# Patient Record
Sex: MALE | Race: BLACK OR AFRICAN AMERICAN | NOT HISPANIC OR LATINO | Employment: FULL TIME | URBAN - METROPOLITAN AREA
[De-identification: names, ages, dates, MRNs, and addresses within clinical notes are randomized per-mention and may not be internally consistent; named-entity substitution may affect disease eponyms.]

---

## 2017-10-15 ENCOUNTER — HOSPITAL ENCOUNTER (EMERGENCY)
Facility: OTHER | Age: 52
Discharge: HOME | End: 2017-10-15
Attending: EMERGENCY MEDICINE
Payer: COMMERCIAL

## 2017-10-15 VITALS
RESPIRATION RATE: 16 BRPM | TEMPERATURE: 98 F | HEIGHT: 75 IN | OXYGEN SATURATION: 99 % | DIASTOLIC BLOOD PRESSURE: 104 MMHG | HEART RATE: 76 BPM | SYSTOLIC BLOOD PRESSURE: 171 MMHG | WEIGHT: 254 LBS | BODY MASS INDEX: 31.58 KG/M2

## 2017-10-15 DIAGNOSIS — M54.41 CHRONIC RIGHT-SIDED LOW BACK PAIN WITH RIGHT-SIDED SCIATICA: Primary | ICD-10-CM

## 2017-10-15 DIAGNOSIS — G89.29 CHRONIC RIGHT-SIDED LOW BACK PAIN WITH RIGHT-SIDED SCIATICA: Primary | ICD-10-CM

## 2017-10-15 PROCEDURE — 99283 EMERGENCY DEPT VISIT LOW MDM: CPT | Mod: 25

## 2017-10-15 PROCEDURE — 96372 THER/PROPH/DIAG INJ SC/IM: CPT

## 2017-10-15 PROCEDURE — 63600175 PHARM REV CODE 636 W HCPCS: Performed by: PHYSICIAN ASSISTANT

## 2017-10-15 PROCEDURE — 25000003 PHARM REV CODE 250: Performed by: PHYSICIAN ASSISTANT

## 2017-10-15 RX ORDER — OXAPROZIN 600 MG/1
600 TABLET, FILM COATED ORAL DAILY
Qty: 20 TABLET | Refills: 0 | Status: SHIPPED | OUTPATIENT
Start: 2017-10-15 | End: 2019-06-25 | Stop reason: ALTCHOICE

## 2017-10-15 RX ORDER — OXYCODONE AND ACETAMINOPHEN 5; 325 MG/1; MG/1
1 TABLET ORAL
Status: COMPLETED | OUTPATIENT
Start: 2017-10-15 | End: 2017-10-15

## 2017-10-15 RX ORDER — KETOROLAC TROMETHAMINE 30 MG/ML
30 INJECTION, SOLUTION INTRAMUSCULAR; INTRAVENOUS
Status: COMPLETED | OUTPATIENT
Start: 2017-10-15 | End: 2017-10-15

## 2017-10-15 RX ORDER — ORPHENADRINE CITRATE 100 MG/1
100 TABLET, EXTENDED RELEASE ORAL 2 TIMES DAILY
Qty: 10 TABLET | Refills: 0 | Status: SHIPPED | OUTPATIENT
Start: 2017-10-15 | End: 2017-10-20

## 2017-10-15 RX ORDER — ORPHENADRINE CITRATE 30 MG/ML
60 INJECTION INTRAMUSCULAR; INTRAVENOUS
Status: COMPLETED | OUTPATIENT
Start: 2017-10-15 | End: 2017-10-15

## 2017-10-15 RX ADMIN — OXYCODONE HYDROCHLORIDE AND ACETAMINOPHEN 1 TABLET: 5; 325 TABLET ORAL at 04:10

## 2017-10-15 RX ADMIN — KETOROLAC TROMETHAMINE 30 MG: 30 INJECTION, SOLUTION INTRAMUSCULAR at 03:10

## 2017-10-15 RX ADMIN — ORPHENADRINE CITRATE 60 MG: 30 INJECTION INTRAMUSCULAR; INTRAVENOUS at 03:10

## 2017-10-15 NOTE — ED TRIAGE NOTES
Pt reports prior pain management office shut down and could no longer see him. Pt reports the office he was referred to instructed him that neurosurgery is the only option. Pt reports he is in the process of setting up an operation that would take place in [place]. Pt reports he has had positive relief with Toradol. Pt reports he is out of his home medications and it is causing him difficulty to get up the 3 flights of stairs at work. Pt with paperwork from [facility].

## 2017-10-15 NOTE — ED PROVIDER NOTES
"Encounter Date: 10/15/2017       History     Chief Complaint   Patient presents with    Sciatica     "problem with sciatica", lower right back pain, shooting through butt and radiating down.      Patient is 52 year old male who presents with complaints of exacerbation of chronic lower back pain and right sided sciatica. Patient reports that he was previously being cared for by the chronic pain clinic at [facility] which is now about of business.  He was referred to multiple pain management clinic and has explored options without any relief from injections.  Ultimately he has been referred for neurosurgery but he has not yet been able to make an appointment.  He is currently out of his narcotic pain medication and reports that his daily dose of Aleve is not managing her symptoms appropriately.  He reports he has pain with every step.  He denies falls, lotion the weakness, bladder or bowel incontinence or fever chills.  He does admit that he has had relief with Toradol in the past.  He is not currently taking any muscle relaxer's but reports muscle relaxer have also helped in the past.  He is also requesting for any pain management resources at [facility] may have to offer.  He is currently unaccompanied in the ER.          Review of patient's allergies indicates:   Allergen Reactions    Corticosteroids (glucocorticoids)      "Psychosis"     Past Medical History:   Diagnosis Date    Anxiety     Arthritis      No past surgical history on file.  Family History   Problem Relation Age of Onset    Hypertension Mother      Social History   Substance Use Topics    Smoking status: Never Smoker    Smokeless tobacco: Not on file      Comment: .  3 children.   with NOFD.    Alcohol use No     Review of Systems   Constitutional: Negative for fever.   HENT: Negative for sore throat.    Respiratory: Negative for shortness of breath.    Cardiovascular: Negative for chest pain.   Gastrointestinal: Negative " for nausea.   Genitourinary: Negative for dysuria.   Musculoskeletal: Positive for back pain.   Skin: Negative for rash.   Neurological: Negative for weakness.   Hematological: Does not bruise/bleed easily.       Physical Exam     Initial Vitals   BP Pulse Resp Temp SpO2   10/15/17 1513 10/15/17 1511 10/15/17 1511 10/15/17 1511 10/15/17 1511   (!) 179/100 85 16 98.1 °F (36.7 °C) 98 %      MAP       10/15/17 1513       126.33         Physical Exam    Nursing note and vitals reviewed.  Constitutional: He appears well-developed and well-nourished. He is not diaphoretic. No distress.   Healthy appearing male in NAD or apparent pain. He makes good eye contact, speaks in clear full sentences and ambulates with mildly antalgic gait.    HENT:   Head: Normocephalic and atraumatic.   Eyes: Conjunctivae and EOM are normal. Pupils are equal, round, and reactive to light. Right eye exhibits no discharge. Left eye exhibits no discharge. No scleral icterus.   Neck: Normal range of motion.   Cardiovascular: Normal rate, regular rhythm and normal heart sounds. Exam reveals no gallop and no friction rub.    No murmur heard.  Pulmonary/Chest: Breath sounds normal. He has no wheezes. He has no rhonchi. He has no rales.   Abdominal: Soft. Bowel sounds are normal. There is no tenderness. There is no rebound and no guarding.   Musculoskeletal: Normal range of motion. He exhibits no edema or tenderness.   No C, T, L midline bony tenderness crepitus or step-offs.  Right-sided lumbar paraspinal musculature tenderness to palpation with negative straight leg raise.  Normal sensation to light touch to bilateral lower extremities with normal range of motion.  No bony landmark tenderness or deformities to bilateral lower extremity.   Neurological: He is alert and oriented to person, place, and time. He has normal strength. No cranial nerve deficit or sensory deficit.   Skin: Skin is warm. Capillary refill takes less than 2 seconds. No rash and no  abscess noted. No erythema.   Psychiatric: He has a normal mood and affect. His behavior is normal. Thought content normal.         ED Course   Procedures  Labs Reviewed - No data to display          Medical Decision Making:   ED Management:  Urgent evaluation a 52-year-old male who presents with complaints of acute exacerbation of chronic lower back pain and sciatica.  He is afebrile, nontoxic appearing, hemodynamically stable though hypertensive likely second to pain at 179/100.  Physical exam reveals no concern for acute for vertebral fracture, cord compression, cauda equina syndrome or spinal infection.  No neuro imaging felt warranted at this time.  Do not feel appropriate prescribing narcotic pain medication for this patient's chronic pain exacerbation however we'll give IM Toradol and muscle relaxers and discharge with the same.  We'll encourage him to establish care at [facility] pain management clinic.  He verbalizes understanding is amenable to plan.  Case discussed with attending who agrees with plan  Other:   I have discussed this case with another health care provider.       <> Summary of the Discussion: [facility]                    ED Course      Clinical Impression:   The encounter diagnosis was Chronic right-sided low back pain with right-sided sciatica.                           Dana Johnson PA-C  10/15/17 5768

## 2017-10-15 NOTE — ED NOTES
"Pt reports sciatica pain x 7 years, used to be controlled with pain meds but PCP stopped prescribing them. Pt followed up with pain mgmt, informed pt to follow up with neurosurgery because "there was nothing they could do".   "

## 2017-10-16 ENCOUNTER — HOSPITAL ENCOUNTER (EMERGENCY)
Facility: OTHER | Age: 52
Discharge: HOME | End: 2017-10-16
Attending: EMERGENCY MEDICINE
Payer: COMMERCIAL

## 2017-10-16 VITALS
RESPIRATION RATE: 20 BRPM | HEART RATE: 84 BPM | SYSTOLIC BLOOD PRESSURE: 177 MMHG | BODY MASS INDEX: 31.58 KG/M2 | WEIGHT: 254 LBS | DIASTOLIC BLOOD PRESSURE: 95 MMHG | TEMPERATURE: 97 F | OXYGEN SATURATION: 97 % | HEIGHT: 75 IN

## 2017-10-16 DIAGNOSIS — M54.31 RIGHT SIDED SCIATICA: Primary | ICD-10-CM

## 2017-10-16 PROCEDURE — 99283 EMERGENCY DEPT VISIT LOW MDM: CPT

## 2017-10-16 PROCEDURE — 25000003 PHARM REV CODE 250: Performed by: EMERGENCY MEDICINE

## 2017-10-16 RX ORDER — OXYCODONE AND ACETAMINOPHEN 5; 325 MG/1; MG/1
1 TABLET ORAL
Status: COMPLETED | OUTPATIENT
Start: 2017-10-16 | End: 2017-10-16

## 2017-10-16 RX ORDER — OXYCODONE AND ACETAMINOPHEN 5; 325 MG/1; MG/1
1 TABLET ORAL EVERY 6 HOURS PRN
Qty: 12 TABLET | Refills: 0 | Status: ON HOLD | OUTPATIENT
Start: 2017-10-16 | End: 2018-01-19 | Stop reason: HOSPADM

## 2017-10-16 RX ORDER — LURASIDONE HYDROCHLORIDE 40 MG/1
40 TABLET, FILM COATED ORAL NIGHTLY
COMMUNITY
End: 2019-06-25

## 2017-10-16 RX ADMIN — OXYCODONE HYDROCHLORIDE AND ACETAMINOPHEN 1 TABLET: 5; 325 TABLET ORAL at 08:10

## 2017-10-17 NOTE — ED TRIAGE NOTES
Pt CO chronic lower back pain, and is unable to walk a long distance, and also unable to lift right leg. Pt also CO tingling and pain radiating down both legs.

## 2017-10-17 NOTE — ED PROVIDER NOTES
"Encounter Date: 10/16/2017    SCRIBE #1 NOTE: I, Bre Farias , am scribing for, and in the presence of, Dr. Morris.       History     Chief Complaint   Patient presents with    Sciatica     R side sciatica x 7 yrs, was here yesterday for same     Time seen by provider: 7:59 PM    This is a 52 y.o. male who presents with complaint of back pain that began seven years ago. Right lower back pain reoccurred yesterday, and is described as constant, severe, and radiating to the RLE. Pt has experienced difficulty walking because the pain causes his RLE to "lock up." He reports taking Percocet for seven years, but "ran out." Pt denies incontinence, weakness, numbness, or any urinary symptoms.He was instructed to follow up with neurology while being seen by pain management, and received Daypro and Norflex when seen in the ED yesterday for the same symptoms.       The history is provided by the spouse and the patient.     Review of patient's allergies indicates:   Allergen Reactions    Corticosteroids (glucocorticoids)      "Psychosis"     Past Medical History:   Diagnosis Date    Anxiety     Arthritis     Sciatica      History reviewed. No pertinent surgical history.  Family History   Problem Relation Age of Onset    Hypertension Mother      Social History   Substance Use Topics    Smoking status: Never Smoker    Smokeless tobacco: Never Used      Comment: .  3 children.   with NOFD.    Alcohol use No     Review of Systems   Constitutional: Negative for chills and fever.   HENT: Negative for congestion and sore throat.    Eyes: Negative for photophobia and redness.   Respiratory: Negative for cough and shortness of breath.    Cardiovascular: Negative for chest pain.   Gastrointestinal: Negative for abdominal pain, nausea and vomiting.   Genitourinary: Negative for decreased urine volume, difficulty urinating, dysuria, frequency, hematuria and urgency.        Negative for incontinence.  "   Musculoskeletal: Positive for back pain.   Skin: Negative for rash.   Neurological: Negative for weakness, light-headedness, numbness and headaches.   Psychiatric/Behavioral: Negative for confusion.       Physical Exam     Initial Vitals [10/16/17 1917]   BP Pulse Resp Temp SpO2   (!) 177/95 84 20 97.4 °F (36.3 °C) 97 %      MAP       122.33         Physical Exam    Nursing note and vitals reviewed.  Constitutional: He appears well-developed and well-nourished. He is not diaphoretic. No distress.   HENT:   Head: Normocephalic and atraumatic.   Right Ear: External ear normal.   Left Ear: External ear normal.   Eyes: EOM are normal. Pupils are equal, round, and reactive to light. Right eye exhibits no discharge. Left eye exhibits no discharge.   Neck: Normal range of motion.   Cardiovascular: Normal rate, regular rhythm and normal heart sounds. Exam reveals no gallop and no friction rub.    No murmur heard.  Pulmonary/Chest: Breath sounds normal. No respiratory distress. He has no wheezes. He has no rhonchi. He has no rales.   Abdominal: Soft. There is no tenderness. There is no rebound and no guarding.   Musculoskeletal: He exhibits no edema or tenderness.   Unable to perform straight leg test.    Neurological: He is alert and oriented to person, place, and time.   Strength is 5/5 to the bilateral lower extremities proximally and distally.   Skin: Skin is warm and dry. No rash and no abscess noted. No erythema. No pallor.   Psychiatric: He has a normal mood and affect. His behavior is normal. Judgment and thought content normal.         ED Course   Procedures  Labs Reviewed - No data to display             Additional MDM:   Comments: 52-year-old male with a chronic history of right-sided sciatica previously managed by pain management at [facility] presents with complaint of persistent pain not relieved with Norflex and Daypro given to him yesterday in the emergency department here.  He denies any new symptoms.   He again denies any bladder or bowel dysfunction.  He reports having difficulty walking today but attributes it to the severe pain and not to leg weakness or numbness.  I do not suspect cord compression.  I did initially offer him a dose of morphine IM since he states the Toradol did not provide him any relief yesterday and he is ALLERGIC to steroids.  However, the patient is currently on duty for work.  He did insists that he could take Percocet as he has taken Percocet in the past and has been able to work.  I did inform him that he is not able to drive after taking this.  His wife is present in the emergency department and states that she will be driving him home.  He was given a dose of Percocet and a prescription for Percocet ×2 days.  He was also given  the contact information to the back and spine center at [facility] for further evaluation.  .              ED Course      Clinical Impression:     1. Right sided sciatica                                 Ema Morris MD  10/16/17 2046

## 2017-10-20 ENCOUNTER — HOSPITAL ENCOUNTER (OUTPATIENT)
Dept: RADIOLOGY | Facility: OTHER | Age: 52
Discharge: HOME | End: 2017-10-20
Attending: PHYSICIAN ASSISTANT
Payer: COMMERCIAL

## 2017-10-20 DIAGNOSIS — M54.5 CHRONIC LOW BACK PAIN, UNSPECIFIED BACK PAIN LATERALITY, WITH SCIATICA PRESENCE UNSPECIFIED: ICD-10-CM

## 2017-10-20 DIAGNOSIS — G89.29 CHRONIC LOW BACK PAIN, UNSPECIFIED BACK PAIN LATERALITY, WITH SCIATICA PRESENCE UNSPECIFIED: Primary | ICD-10-CM

## 2017-10-20 DIAGNOSIS — M54.5 CHRONIC LOW BACK PAIN, UNSPECIFIED BACK PAIN LATERALITY, WITH SCIATICA PRESENCE UNSPECIFIED: Primary | ICD-10-CM

## 2017-10-20 DIAGNOSIS — G89.29 CHRONIC LOW BACK PAIN, UNSPECIFIED BACK PAIN LATERALITY, WITH SCIATICA PRESENCE UNSPECIFIED: ICD-10-CM

## 2017-10-20 PROCEDURE — 72100 X-RAY EXAM L-S SPINE 2/3 VWS: CPT | Mod: 26,,, | Performed by: RADIOLOGY

## 2017-10-20 PROCEDURE — 72100 X-RAY EXAM L-S SPINE 2/3 VWS: CPT | Mod: TC

## 2017-10-20 PROCEDURE — 72120 X-RAY BEND ONLY L-S SPINE: CPT | Mod: 26,,, | Performed by: RADIOLOGY

## 2017-10-23 ENCOUNTER — OFFICE VISIT (OUTPATIENT)
Dept: PAIN MEDICINE | Facility: CLINIC | Age: 52
End: 2017-10-23
Attending: ANESTHESIOLOGY
Payer: COMMERCIAL

## 2017-10-23 VITALS
HEART RATE: 84 BPM | SYSTOLIC BLOOD PRESSURE: 157 MMHG | RESPIRATION RATE: 18 BRPM | HEIGHT: 75 IN | BODY MASS INDEX: 30.7 KG/M2 | OXYGEN SATURATION: 99 % | WEIGHT: 246.94 LBS | DIASTOLIC BLOOD PRESSURE: 101 MMHG

## 2017-10-23 DIAGNOSIS — M54.16 LUMBAR RADICULOPATHY: ICD-10-CM

## 2017-10-23 DIAGNOSIS — M47.816 FACET ARTHROPATHY, LUMBAR: ICD-10-CM

## 2017-10-23 DIAGNOSIS — M54.12 CERVICAL RADICULOPATHY: ICD-10-CM

## 2017-10-23 DIAGNOSIS — M51.36 DDD (DEGENERATIVE DISC DISEASE), LUMBAR: ICD-10-CM

## 2017-10-23 PROBLEM — M51.369 DDD (DEGENERATIVE DISC DISEASE), LUMBAR: Status: ACTIVE | Noted: 2017-10-23

## 2017-10-23 PROCEDURE — 99204 OFFICE O/P NEW MOD 45 MIN: CPT | Mod: S$GLB,,, | Performed by: ANESTHESIOLOGY

## 2017-10-23 PROCEDURE — 99999 PR PBB SHADOW E&M-EST. PATIENT-LVL IV: CPT | Mod: PBBFAC,,, | Performed by: ANESTHESIOLOGY

## 2017-10-23 NOTE — PROGRESS NOTES
Chronic Pain - New Consult    Referring Physician: Self, Aaareferral    Chief Complaint:   Chief Complaint   Patient presents with    Low-back Pain     New Patient         SUBJECTIVE: Disclaimer: This note has been generated using voice-recognition software. There may be typographical errors that have been missed during proof-reading    Initial encounter:    Saul Hancock presents to the clinic for the evaluation of Low back pain. The pain started over approximately 7 years ago following an accident and symptoms have been worsening.    Brief history:  Patient reports history of psychosis associated with multiple epidural steroid injections in the past he was started on Latuda after this with psychiatry at outside facility he states that he sees a psychiatrist regularly last visit was several months ago.  The patient also reports that he did discontinue all his medications including oxycodone acetaminophen oxaprozin and Latuda.  He states that he is currently not taking any medication    Pain Description:    The pain is located in the Low back  area and radiates to the Right lower extremity in the L5/S1 distribution.      At BEST  7/10     At WORST  10/10 on the WORST day.      On average pain is rated as 7/10.     Today the pain is rated as 8/10    The pain is described as burning, dull, numbing, sharp, shooting, throbbing, tight band and tingling      Symptoms interfere with daily activity, sleeping and work.     Exacerbating factors: Sitting, Standing, Laying, Bending, Coughing/Sneezing, Walking, Night Time, Morning, Lifting and Getting out of bed/chair.      Mitigating factors medications.     Patient denies night fever/night sweats, urinary incontinence, bowel incontinence, significant weight loss and significant motor weakness.  Patient denies any suicidal or homicidal ideations    Pain Medications:  Current:stopped Oxycodone    Tried in Past:  NSAIDs -Naproxen and mobic  TCA -Never  SNRI  "-Never  Anti-convulsants -Never  Muscle Relaxants -Robaxin   Opioids-oxycodone    Physical Therapy/Home Exercise: no       report:  Reviewed and consistent with medication use as prescribed.    Pain Procedures: had some in the past    Chiropractor -never  Acupuncture - never  TENS unit -never  Spinal decompression -never  Joint replacement -never    Imaging:    MRI lumbar spine 2012 at disc herniation with impingement and contact on the right S1 nerve root    MRI cervical spine 2012 multilevel neuroforaminal stenosis which was moderate to severe    X-ray Lumbar spine 10/20/2017  Technique: AP and lateral views including flexion/extension views of the lumbar spine.    Comparison: None    Findings:   Degenerative disc disease at L5-S1. No instability. Vertebral body heights are maintained. Paravertebral soft tissues are unremarkable.   Impression        Degenerative disc disease at L5-S1. No instability.      Electronically signed by: MADDIE MOSER MD  Date: 10/20/17  Time: 14:12     Encounter     View Encounter                Past Medical History:   Diagnosis Date    Anxiety     Arthritis     Sciatica      History reviewed. No pertinent surgical history.  Social History     Social History    Marital status:      Spouse name: N/A    Number of children: N/A    Years of education: N/A     Occupational History    Not on file.     Social History Main Topics    Smoking status: Never Smoker    Smokeless tobacco: Never Used      Comment: .  3 children.   with NOFD.    Alcohol use No    Drug use: No    Sexual activity: Yes     Partners: Female     Other Topics Concern    Not on file     Social History Narrative    No narrative on file     Family History   Problem Relation Age of Onset    Hypertension Mother        Review of patient's allergies indicates:   Allergen Reactions    Corticosteroids (glucocorticoids)      "Psychosis"       Current Outpatient Prescriptions " "  Medication Sig    lurasidone (LATUDA) 40 mg Tab tablet Take 40 mg by mouth nightly.    oxaprozin (DAYPRO) 600 mg tablet Take 1 tablet (600 mg total) by mouth once daily.    oxycodone-acetaminophen (PERCOCET) 5-325 mg per tablet Take 1 tablet by mouth every 6 (six) hours as needed for Pain.     No current facility-administered medications for this visit.        REVIEW OF SYSTEMS:    GENERAL:  No weight loss, malaise or fevers.  HEENT:   No recent changes in vision or hearing  NECK:  Negative for lumps, no difficulty with swallowing.  RESPIRATORY:  Negative for cough, wheezing or shortness of breath, patient denies any recent URI.  CARDIOVASCULAR:  Negative for chest pain, leg swelling or palpitations.  GI:  Negative for abdominal discomfort, blood in stools or black stools or change in bowel habits.  MUSCULOSKELETAL:  See HPI.  SKIN:  Negative for lesions, rash, and itching.  PSYCH: History of psychosis associated with steroid injections.  Patients sleep is disturbed secondary to pain.  HEMATOLOGY/LYMPHOLOGY:  Negative for prolonged bleeding, bruising easily or swollen nodes.  Patient is not currently taking any anti-coagulants  ENDO: No history of diabetes or thyroid dysfunction  NEURO:   No history of headaches, syncope, paralysis, seizures or tremors.  All other reviewed and negative other than HPI.    OBJECTIVE:    BP (!) 157/101   Pulse 84   Resp 18   Ht 6' 3" (1.905 m)   Wt 112 kg (246 lb 14.6 oz)   SpO2 99%   BMI 30.86 kg/m²     PHYSICAL EXAMINATION:    GENERAL: Well appearing, in no acute distress, alert and oriented x3.  PSYCH:  Mood and affect appropriate.  SKIN: Skin color, texture, turgor normal, no rashes or lesions.  HEAD/FACE:  Normocephalic, atraumatic. Cranial nerves grossly intact.  CV: RRR with palpation of the radial artery.  PULM: No evidence of respiratory difficulty, symmetric chest rise.  NECK:  Spurling negative, normal range of motion of neck without reproduction of pain.  BACK: " Straight leg raising in the sitting and supine positions is negative to radicular pain. There is pain with palpation over the facet joints of the lumbar spine bilaterally. There is decreased range of motion with extension to 15 degrees, and facet loading maneuvers cause reproducible pain.    EXTREMITIES: Peripheral joint ROM is full and pain free without obvious instability or laxity in all four extremities. No deformities, edema, or skin discoloration. Good capillary refill.  MUSCULOSKELETAL: Hip, and knee provocative maneuvers are negative.  There is  pain with palpation over the sacroiliac joints bilaterally.  There is no pain to palpation over the greater trochanteric bursa bilaterally.  FABERs test is positive.  FADIRs test is negative.  Bilateral upper extremity hand strength is normal and symmetric.  4/5 strength in right ankle with plantar and 4/5 dorsiflexion, 5/5 strength in left ankle with plantar and dorsiflexion, 5/5 strength with right knee flexion extension, 5/5 strength with knee flexion extension on the left .  No atrophy or tone abnormalities are noted.  NEURO: Bilateral upper and lower extremity coordination and muscle stretch reflexes are physiologic and symmetric.  Plantar response are downgoing. Valerio's negative. No clonus.  Decreased sensation light touch over the medial lateral aspect of the right foot  GAIT: Antalgic, ambulates with crutches    ASSESSMENT: 52 y.o. year old male with pain, consistent with     Encounter Diagnoses   Name Primary?    Lumbar radiculopathy     Cervical radiculopathy     DDD (degenerative disc disease), lumbar     Facet arthropathy, lumbar        PLAN:   I will obtain flex and extension x-rays of the cervical spine to rule out instability prior to physical therapy    I will send the patient physical therapy for core strengthening and stretching for the lumbar spine, the patient does have weakness in his right lower extremity which could be old.    Update MRI  lumbar spine without contrast to evaluate for anatomic changes compared to 2012 MRI.    Follow-up in 2 weeks to review imaging, in the future the patient may benefit from neurosurgical evaluation versus nonsteroid-based injections    Contact psychiatrist to inform them that he has stopped Latuda    Greater than 25 minutes spent in total in todays visit with the patient, with more than half that time direct face to face counseling and education with the patient today. We discussed the disease process, prognosis, treatment plan, and risks and benefits.     Daly Montalvo  10/23/2017

## 2017-10-27 ENCOUNTER — HOSPITAL ENCOUNTER (OUTPATIENT)
Dept: RADIOLOGY | Facility: OTHER | Age: 52
Discharge: HOME | End: 2017-10-27
Attending: ANESTHESIOLOGY
Payer: COMMERCIAL

## 2017-10-27 DIAGNOSIS — M51.36 DDD (DEGENERATIVE DISC DISEASE), LUMBAR: ICD-10-CM

## 2017-10-27 DIAGNOSIS — M54.12 CERVICAL RADICULOPATHY: ICD-10-CM

## 2017-10-27 DIAGNOSIS — M47.816 FACET ARTHROPATHY, LUMBAR: ICD-10-CM

## 2017-10-27 DIAGNOSIS — M54.16 LUMBAR RADICULOPATHY: ICD-10-CM

## 2017-10-27 PROCEDURE — 72052 X-RAY EXAM NECK SPINE 6/>VWS: CPT | Mod: TC

## 2017-10-27 PROCEDURE — 72148 MRI LUMBAR SPINE W/O DYE: CPT | Mod: 26,,, | Performed by: RADIOLOGY

## 2017-10-27 PROCEDURE — 72052 X-RAY EXAM NECK SPINE 6/>VWS: CPT | Mod: 26,,, | Performed by: RADIOLOGY

## 2017-10-27 PROCEDURE — 72148 MRI LUMBAR SPINE W/O DYE: CPT | Mod: TC

## 2017-11-02 ENCOUNTER — CLINICAL SUPPORT (OUTPATIENT)
Dept: REHABILITATION | Facility: HOSPITAL | Age: 52
End: 2017-11-02
Attending: ANESTHESIOLOGY
Payer: COMMERCIAL

## 2017-11-02 DIAGNOSIS — M51.9 LUMBAR DISC DISEASE: ICD-10-CM

## 2017-11-02 DIAGNOSIS — Z78.9 IMPAIRED MOBILITY AND ACTIVITIES OF DAILY LIVING: ICD-10-CM

## 2017-11-02 DIAGNOSIS — R52 PAIN RELATED TO IMPAIRED PHYSICAL MOBILITY: ICD-10-CM

## 2017-11-02 DIAGNOSIS — M54.16 LUMBAR RADICULOPATHY: Primary | ICD-10-CM

## 2017-11-02 DIAGNOSIS — Z74.09 IMPAIRED MOBILITY AND ACTIVITIES OF DAILY LIVING: ICD-10-CM

## 2017-11-02 DIAGNOSIS — Z74.09 PAIN RELATED TO IMPAIRED PHYSICAL MOBILITY: ICD-10-CM

## 2017-11-02 DIAGNOSIS — M51.36 DDD (DEGENERATIVE DISC DISEASE), LUMBAR: ICD-10-CM

## 2017-11-02 PROCEDURE — 97161 PT EVAL LOW COMPLEX 20 MIN: CPT | Mod: PN | Performed by: PHYSICAL THERAPIST
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
## 2017-11-07 ENCOUNTER — OFFICE VISIT (OUTPATIENT)
Dept: PAIN MEDICINE | Facility: CLINIC | Age: 52
End: 2017-11-07
Attending: ANESTHESIOLOGY
Payer: COMMERCIAL

## 2017-11-07 VITALS
HEIGHT: 75 IN | RESPIRATION RATE: 18 BRPM | HEART RATE: 78 BPM | TEMPERATURE: 98 F | DIASTOLIC BLOOD PRESSURE: 111 MMHG | BODY MASS INDEX: 31.25 KG/M2 | WEIGHT: 251.31 LBS | SYSTOLIC BLOOD PRESSURE: 168 MMHG

## 2017-11-07 DIAGNOSIS — M51.9 LUMBAR DISC DISEASE: Primary | ICD-10-CM

## 2017-11-07 DIAGNOSIS — M51.36 DDD (DEGENERATIVE DISC DISEASE), LUMBAR: ICD-10-CM

## 2017-11-07 DIAGNOSIS — M47.816 FACET ARTHROPATHY, LUMBAR: ICD-10-CM

## 2017-11-07 DIAGNOSIS — M51.16 LUMBAR DISC HERNIATION WITH RADICULOPATHY: ICD-10-CM

## 2017-11-07 DIAGNOSIS — M54.16 LUMBAR RADICULOPATHY: ICD-10-CM

## 2017-11-07 PROCEDURE — 99999 PR PBB SHADOW E&M-EST. PATIENT-LVL III: CPT | Mod: PBBFAC,,, | Performed by: ANESTHESIOLOGY

## 2017-11-07 PROCEDURE — 99214 OFFICE O/P EST MOD 30 MIN: CPT | Mod: S$GLB,,, | Performed by: ANESTHESIOLOGY

## 2017-11-07 RX ORDER — GABAPENTIN 300 MG/1
CAPSULE ORAL
Qty: 180 CAPSULE | Refills: 2 | Status: SHIPPED | OUTPATIENT
Start: 2017-11-07 | End: 2017-11-30 | Stop reason: SDUPTHER

## 2017-11-07 RX ORDER — OXYCODONE AND ACETAMINOPHEN 10; 325 MG/1; MG/1
1 TABLET ORAL EVERY 6 HOURS PRN
Qty: 30 TABLET | Refills: 0 | Status: SHIPPED | OUTPATIENT
Start: 2017-11-07 | End: 2017-11-30 | Stop reason: SDUPTHER
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
## 2017-11-09 ENCOUNTER — CLINICAL SUPPORT (OUTPATIENT)
Dept: REHABILITATION | Facility: HOSPITAL | Age: 52
End: 2017-11-09
Attending: ANESTHESIOLOGY
Payer: COMMERCIAL

## 2017-11-09 DIAGNOSIS — M54.12 CERVICAL RADICULOPATHY: ICD-10-CM

## 2017-11-09 DIAGNOSIS — M54.16 LUMBAR RADICULOPATHY: Primary | ICD-10-CM

## 2017-11-09 DIAGNOSIS — M51.9 LUMBAR DISC DISEASE: ICD-10-CM

## 2017-11-09 DIAGNOSIS — M51.36 DDD (DEGENERATIVE DISC DISEASE), LUMBAR: ICD-10-CM

## 2017-11-09 PROCEDURE — 97110 THERAPEUTIC EXERCISES: CPT | Mod: PN | Performed by: PHYSICAL THERAPIST

## 2017-11-09 NOTE — PROGRESS NOTES
"                                            Physical Therapy Daily Note           Name: Saul Hancock  [facility] Number: [id number]  Date of Treatment: 11/09/2017   Diagnosis:   Encounter Diagnoses   Name Primary?    Lumbar radiculopathy Yes    DDD (degenerative disc disease), lumbar     Lumbar disc disease        Time in: 1315  Time Out: 1425  Total Treatment Time: 70    VISITS / MURO: 2  BOLD=INDICATES ACTIVITIES PERFORMED.      Subjective  Patient states "his pain is in the low back, buttocks and gluteal region RLE. Said he went to the MD and he prescribed some percocet. Had MRI that showed a disc bulge. Reports hip pain on the right that has been there for many years but the pain meds have hidden how much of a problem he actually is having to deal with.   Pain level - 7/10 back/glut/post thigh    Objective    Treatment: Patient  was instructed in and performed therapeutic exercises to develop strength, ROM, flexibility, posture, core stabilization and pain reduction . Patient performed therapeutic exercises consisting of the following      Leg press   Recumbent bike  Upright bike   UE ergometer  Treadmill   Elliptical       THERAPEUTIC EXERCISE  SUPINE  -sciatic nerve flossing 5/10   -SKC 5" x12   -LTR  x20    SIDELYING  -left SL w/R - rot mobs x12     PRONE  -prone w/right lateral shift 3'   -sustained exten 3'   -repeated exten  x30     STANDING.  -wall exten w/right lateral shift x12   -thread the needle thoracic rotation       SITTING   -back stretches   -dural flossing x10       MANUAL THERAPY  MODALITY  DIRECT EDUCATION:   :   Direct education 1-1 with PT spent with the patient  7'  minutes as to his ( condition) with emphasis on  management of his condition.  Advised that he will continue to encounter pain associated with activity but the goal is to reduce the intensity and location to more proximal from distal .    Patient was issued HEP   Written Home Exercises Reviewed.   Pt demo good understanding of " the education provided. Patient demonstrated good return demonstration of activities      Assessment  Patient completed the activities as noted. He reported that he ws feeling somewhat better at EOS. Demonstrates pain with all movements and all activities but the pain does not inhibit his ability perform the exercises.   Medical Necessity is demonstrated by:   challenged/ difficulty  to participate in daily activities and   to participate in vocational pursuits, Pain limits function of effected part for some activities, Requires skilled supervision to complete and progress treatment interventions and HEP.  Pt demo good understanding of the education provided. Patient demonstrated good return demonstration of activities.Patient's tolerance to treatment was fair. Patient will continue to benefit from skilled PTintervention.Patient is making progress towards established goals.  New/Revised goals: no  Continue with established Plan of Care towards PT goals.       PLAN     Certification Period: 11/2/2017 to 2/2/2018  Recommended Treatment Plan: 2 times per week for 12 weeks: Manual Therapy, Neuromuscular Re-ed, Patient Education and Therapeutic Exercise  Other Recommendations:   . Pt may be seen by PTA as part of the rehabilitation team.         Therapist: Montana Rosenberg, PT

## 2017-11-10 PROBLEM — R52 PAIN RELATED TO IMPAIRED PHYSICAL MOBILITY: Status: ACTIVE | Noted: 2017-11-10

## 2017-11-10 PROBLEM — Z74.09 IMPAIRED MOBILITY AND ACTIVITIES OF DAILY LIVING: Status: ACTIVE | Noted: 2017-11-10

## 2017-11-10 PROBLEM — Z78.9 IMPAIRED MOBILITY AND ACTIVITIES OF DAILY LIVING: Status: ACTIVE | Noted: 2017-11-10

## 2017-11-10 PROBLEM — Z74.09 PAIN RELATED TO IMPAIRED PHYSICAL MOBILITY: Status: ACTIVE | Noted: 2017-11-10
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
## 2017-11-14 ENCOUNTER — HOSPITAL ENCOUNTER (OUTPATIENT)
Facility: OTHER | Age: 52
Discharge: HOME | End: 2017-11-14
Attending: ANESTHESIOLOGY | Admitting: ANESTHESIOLOGY
Payer: COMMERCIAL

## 2017-11-14 ENCOUNTER — SURGERY (OUTPATIENT)
Age: 52
End: 2017-11-14

## 2017-11-14 VITALS
TEMPERATURE: 98 F | WEIGHT: 251 LBS | DIASTOLIC BLOOD PRESSURE: 94 MMHG | OXYGEN SATURATION: 96 % | RESPIRATION RATE: 18 BRPM | HEART RATE: 66 BPM | SYSTOLIC BLOOD PRESSURE: 163 MMHG | HEIGHT: 75 IN | BODY MASS INDEX: 31.21 KG/M2

## 2017-11-14 DIAGNOSIS — M54.16 LUMBAR RADICULOPATHY: Primary | ICD-10-CM

## 2017-11-14 PROCEDURE — 64484 NJX AA&/STRD TFRM EPI L/S EA: CPT | Performed by: ANESTHESIOLOGY

## 2017-11-14 PROCEDURE — 64483 NJX AA&/STRD TFRM EPI L/S 1: CPT | Performed by: ANESTHESIOLOGY

## 2017-11-14 PROCEDURE — 99152 MOD SED SAME PHYS/QHP 5/>YRS: CPT | Mod: ,,, | Performed by: ANESTHESIOLOGY

## 2017-11-14 PROCEDURE — 64484 NJX AA&/STRD TFRM EPI L/S EA: CPT | Mod: RT,,, | Performed by: ANESTHESIOLOGY

## 2017-11-14 PROCEDURE — 25000003 PHARM REV CODE 250: Performed by: ANESTHESIOLOGY

## 2017-11-14 PROCEDURE — 25500020 PHARM REV CODE 255: Performed by: ANESTHESIOLOGY

## 2017-11-14 PROCEDURE — 64483 NJX AA&/STRD TFRM EPI L/S 1: CPT | Mod: RT,,, | Performed by: ANESTHESIOLOGY

## 2017-11-14 PROCEDURE — 63600175 PHARM REV CODE 636 W HCPCS: Performed by: ANESTHESIOLOGY

## 2017-11-14 RX ORDER — METHOCARBAMOL 500 MG/1
500 TABLET, FILM COATED ORAL 2 TIMES DAILY
Status: ON HOLD | COMMUNITY
End: 2018-01-19

## 2017-11-14 RX ORDER — LIDOCAINE HYDROCHLORIDE 10 MG/ML
INJECTION INFILTRATION; PERINEURAL
Status: DISCONTINUED | OUTPATIENT
Start: 2017-11-14 | End: 2017-11-14 | Stop reason: HOSPADM

## 2017-11-14 RX ORDER — MIDAZOLAM HYDROCHLORIDE 1 MG/ML
INJECTION INTRAMUSCULAR; INTRAVENOUS
Status: DISCONTINUED | OUTPATIENT
Start: 2017-11-14 | End: 2017-11-14 | Stop reason: HOSPADM

## 2017-11-14 RX ORDER — NAPROXEN 500 MG/1
500 TABLET ORAL ONCE AS NEEDED
Status: ON HOLD | COMMUNITY
End: 2018-01-19 | Stop reason: HOSPADM

## 2017-11-14 RX ORDER — BUPIVACAINE HYDROCHLORIDE 2.5 MG/ML
INJECTION, SOLUTION EPIDURAL; INFILTRATION; INTRACAUDAL
Status: DISCONTINUED | OUTPATIENT
Start: 2017-11-14 | End: 2017-11-14 | Stop reason: HOSPADM

## 2017-11-14 RX ORDER — SODIUM CHLORIDE 9 MG/ML
INJECTION, SOLUTION INTRAVENOUS CONTINUOUS
Status: DISCONTINUED | OUTPATIENT
Start: 2017-11-14 | End: 2017-11-14 | Stop reason: HOSPADM

## 2017-11-14 RX ADMIN — IOHEXOL 10 ML: 300 INJECTION, SOLUTION INTRAVENOUS at 11:11

## 2017-11-14 RX ADMIN — LIDOCAINE HYDROCHLORIDE 10 ML: 10 INJECTION, SOLUTION INFILTRATION; PERINEURAL at 11:11

## 2017-11-14 RX ADMIN — SODIUM CHLORIDE: 0.9 INJECTION, SOLUTION INTRAVENOUS at 10:11

## 2017-11-14 RX ADMIN — BUPIVACAINE HYDROCHLORIDE 10 ML: 2.5 INJECTION, SOLUTION EPIDURAL; INFILTRATION; INTRACAUDAL; PERINEURAL at 11:11

## 2017-11-14 RX ADMIN — MIDAZOLAM HYDROCHLORIDE 2 MG: 1 INJECTION, SOLUTION INTRAMUSCULAR; INTRAVENOUS at 11:11

## 2017-11-14 NOTE — OP NOTE
INFORMED CONSENT: The procedure, risks, benefits and options were discussed with patient. There are no contraindications to the procedure. The patient expressed understanding and agreed to proceed. The personnel performing the procedure was discussed.    11/14/2017    Surgeon: Daly Montalvo MD    Assistants:   Armando Schultz DO, PGY-5, Pain Fellow  I was present and supervising all critical portions of the procedure      PROCEDURE:    1)  Right  L5 TRANSFORAMINAL EPIDURAL STEROID INJECTION    2)  Right  S1 TRANSFORAMINAL EPIDURAL STEROID INJECTION    Pre Procedure diagnosis:    Right  L5 and S1  Lumbar radiculopathy [M54.16]    Post-Procedure diagnosis:   same    Complications: None    Specimens: None      DESCRIPTION OF PROCEDURE: The patient was brought to the procedure room. IV access was obtained prior to the procedure. The patient was positioned prone on the fluoroscopy table. Continuous hemodynamic monitoring was initiated including blood pressure, EKG, and pulse oximetry. . The skin was prepped with chlorhexidine and draped in a sterile fashion. Skin anesthesia was achieved using a total of 10mL of lidocaine, 5mL over each respective injection site.     The  L5 and S1  transforaminal spaces were identified with fluoroscopy in the  AP, oblique, and lateral views.  A 22 gauge spinal quinke needle was then advanced into the area of the trans foraminal spaces bilaterally with confirmation of proper needle position using AP, oblique, and lateral fluoroscopic views. Once the needle tip was in the area of the transforaminal space, and there was no blood, CSF or paraesthesias,  1.5 mL of Omnipaque 300mg/ml was injected on each side for a total of 3mL.  Fluoroscopic imaging in the AP and lateral views revealed a clear outline of the spinal nerve with proximal spread of agent through the neural foramen into the epidural space. A total combination of 1 mL of Bupivicaine 0.25% and 1 mL 1% lidocaine was injected on each  side for a total of 4mL of injected medications with displacement of the contrast dye confirming that the medication went into the area of the transforaminal spaces bilaterally. A sterile dressing was applied.   Patient tolerated the procedure well.    Conscious sedation provided by M.D    The patient was monitored with continuous pulse oximetry, EKG, and intermittent blood pressure monitors.  The patient was hemodynamically stable throughout the entire process was responsive to voice, and breathing spontaneously.  Supplemental O2 was provided at 2L/min via nasal cannula.  Patient was comfortable for the duration of the procedure. (See nurse documentation and case log for sedation time)    There was a total of 2mg IV Midazolam was titrated for the procedure      Patient was taken back to the recovery room for further observation.     The patient was discharged to home in stable condition

## 2017-11-14 NOTE — DISCHARGE SUMMARY
Discharge Note  Short Stay      SUMMARY     Admit Date: 11/14/2017    Attending Physician: Daly Montalvo    Discharge Diagnosis: Lumbar radiculopathy [M54.16]    Discharge Physician: Daly Montalvo      Discharge Date: 11/14/2017 11:46 AM     PROCEDURE:    1)  Right  L5 TRANSFORAMINAL EPIDURAL STEROID INJECTION    2)  Right  S1 TRANSFORAMINAL EPIDURAL STEROID INJECTION    Pre Procedure diagnosis:    Right  L5 and S1  Lumbar radiculopathy [M54.16]    Disposition: Home or self care    Patient Instructions:   Current Discharge Medication List      CONTINUE these medications which have NOT CHANGED    Details   gabapentin (NEURONTIN) 300 MG capsule Take 1 pill at nightx 3 days, followed by 1 pill in morning and night for 3 days, followed by 1 pill morning afternoon and night  Qty: 180 capsule, Refills: 2      lurasidone (LATUDA) 40 mg Tab tablet Take 40 mg by mouth nightly.      methocarbamol (ROBAXIN) 500 MG Tab Take 500 mg by mouth 2 (two) times daily.      naproxen (NAPROSYN) 500 MG tablet Take 500 mg by mouth once as needed.      oxaprozin (DAYPRO) 600 mg tablet Take 1 tablet (600 mg total) by mouth once daily.  Qty: 20 tablet, Refills: 0      oxyCODONE-acetaminophen (PERCOCET)  mg per tablet Take 1 tablet by mouth every 6 (six) hours as needed for Pain.  Qty: 30 tablet, Refills: 0      oxycodone-acetaminophen (PERCOCET) 5-325 mg per tablet Take 1 tablet by mouth every 6 (six) hours as needed for Pain.  Qty: 12 tablet, Refills: 0             Resume home diet and activity

## 2017-11-14 NOTE — DISCHARGE INSTRUCTIONS

## 2017-11-16 ENCOUNTER — CLINICAL SUPPORT (OUTPATIENT)
Dept: REHABILITATION | Facility: HOSPITAL | Age: 52
End: 2017-11-16
Attending: ANESTHESIOLOGY
Payer: COMMERCIAL

## 2017-11-16 DIAGNOSIS — M51.9 LUMBAR DISC DISEASE: ICD-10-CM

## 2017-11-16 DIAGNOSIS — M51.36 DDD (DEGENERATIVE DISC DISEASE), LUMBAR: ICD-10-CM

## 2017-11-16 DIAGNOSIS — M54.16 LUMBAR RADICULOPATHY: Primary | ICD-10-CM

## 2017-11-16 PROCEDURE — 97110 THERAPEUTIC EXERCISES: CPT | Mod: PN | Performed by: PHYSICAL THERAPIST

## 2017-11-16 NOTE — PROGRESS NOTES
"                                            Physical Therapy Daily Note           Name: Saul Hancock  [facility] Number: [id number]  Date of Treatment: 11/16/2017   Diagnosis:   Encounter Diagnoses   Name Primary?    Lumbar radiculopathy Yes    DDD (degenerative disc disease), lumbar     Lumbar disc disease        Time in: 1310  Time Out: 1420  Total Treatment Time: 70    VISITS / MURO: 3/40 - 12/31/2017  BOLD=INDICATES ACTIVITIES PERFORMED.      Subjective  Patient states "he is a lot better than when he first came here. Says he had injections on the 14th which seems to have helped some. Did some exercises last night. Still with pain today but feeling better. Says the pain levels are tolerable meaning "Renaldo walking w/o the crutches".     Pain level - 7-10/10 back/glut/post thigh RLE    Objective    Treatment: Patient  was instructed in and performed therapeutic exercises to develop strength, ROM, flexibility, posture, core stabilization and pain reduction . Patient performed therapeutic exercises consisting of the following    Nu-step 10"  Leg press   Recumbent bike  Upright bike   UE ergometer  Treadmill   Elliptical       THERAPEUTIC EXERCISE  SUPINE  -sciatic nerve flossing 3@1'   -SKC 5" x12   -LTR  x20  -DKC alternate x15     SIDELYING  -left SL w/R - rot mobs x12     PRONE  -prone w/right lateral shift 3'   -sustained exten 3'   -repeated exten  x30      STANDING.  -wall exten w/right lateral shift x12   -thread the needle thoracic rotation       SITTING   -back stretches   -dural flossing x10       MANUAL THERAPY  MODALITY  DIRECT EDUCATION:   :   Direct education 1-1 with PT spent with the patient  7'  minutes as to his ( condition) with emphasis on  management of his condition.  Advised that he will continue to encounter pain associated with activity but the goal is to reduce the intensity and location to more proximal from distal .    Patient was issued HEP   Written Home Exercises Reviewed.   Pt demo " good understanding of the education provided. Patient demonstrated good return demonstration of activities      Assessment    Completed activities. Encountered increased pain with prone lying and positional shifting. The pain became more proximal and intensified in the gluteal and low back. Patient reports he is somewhat better but pain levels are unchanged. He is walking with out crutches but pain level is the same as initial visit.    Medical Necessity is demonstrated by:   challenged/ difficulty  to participate in daily activities and   to participate in vocational pursuits, Pain limits function of effected part for some activities, Requires skilled supervision to complete and progress treatment interventions and HEP.  Pt demo good understanding of the education provided. Patient demonstrated good return demonstration of activities.Patient's tolerance to treatment was fair. Patient will continue to benefit from skilled PTintervention.Patient is making progress towards established goals.  New/Revised goals: no  Continue with established Plan of Care towards PT goals.       PLAN     Certification Period: 11/2/2017 to 2/2/2018  Recommended Treatment Plan: 2 times per week for 12 weeks: Manual Therapy, Neuromuscular Re-ed, Patient Education and Therapeutic Exercise  Other Recommendations:   . Pt may be seen by PTA as part of the rehabilitation team.         Therapist: Montana Rosenberg, PT

## 2017-11-21 ENCOUNTER — CLINICAL SUPPORT (OUTPATIENT)
Dept: REHABILITATION | Facility: HOSPITAL | Age: 52
End: 2017-11-21
Attending: ANESTHESIOLOGY
Payer: COMMERCIAL

## 2017-11-21 DIAGNOSIS — M54.16 LUMBAR RADICULOPATHY: Primary | ICD-10-CM

## 2017-11-21 DIAGNOSIS — M51.36 DDD (DEGENERATIVE DISC DISEASE), LUMBAR: ICD-10-CM

## 2017-11-21 DIAGNOSIS — M51.9 LUMBAR DISC DISEASE: ICD-10-CM

## 2017-11-21 PROCEDURE — 97110 THERAPEUTIC EXERCISES: CPT | Mod: PN

## 2017-11-21 NOTE — PROGRESS NOTES
"                                            Physical Therapy Daily Note         Name: Saul Hancock  Clinic Number: [id number]  Date of Treatment: 11/21/2017   Diagnosis:   Encounter Diagnoses   Name Primary?    Lumbar radiculopathy Yes    DDD (degenerative disc disease), lumbar     Lumbar disc disease      Time in: 1330  Time Out: 1430  Total Treatment Time: 60 minutes (1:1 with PTA 30 minutes of treatment session)     VISITS / MURO: 4/40 - 12/31/2017  BOLD=INDICATES ACTIVITIES PERFORMED.    Subjective  Saul states his back is slowly getting better. Patient states he does think the exercises are helping him.     Pain level - 6-10/10 back/glut/post thigh RLE    Objective    Treatment: Patient  was instructed in and performed therapeutic exercises to develop strength, ROM, flexibility, posture, core stabilization and pain reduction . Patient performed therapeutic exercises consisting of the following    Nu-step x 7 minutes  Leg press   Recumbent bike  Upright bike   UE ergometer  Treadmill   Elliptical     THERAPEUTIC EXERCISE  SUPINE  -Hamstring stretch with nerve glide 1 minute x 3   -SKC 5" x12   -LTR  x20  -DKC alternate x15   -Piriformis stretch 30 sec x 3   -Hip adduction/abduction x 3 minutes (ball and belt--isometric)   -TrA activation x 3 minutes, 5 sec hold     SIDELYING  -Open books (hand across chest) 10x 5 sec hold     PRONE  -prone w/right lateral shift 3'   -sustained exten 3'   -repeated exten  x30      STANDING.  -wall exten w/right lateral shift x12     SITTING   -back stretches   -thread the needle thoracic rotation (using physioball)  -Seated ball roll outs x 3 minutes (all 3 directions)  -dural flossing x10     MANUAL THERAPY  MODALITY  DIRECT EDUCATION:      Patient was issued HEP   Written Home Exercises Reviewed.   Pt demo good understanding of the education provided. Patient demonstrated good return demonstration of activities    Assessment    Saul tolerated treatment well today. " Patient demonstrates good tolerance to therapeutic exercise with no complaints of RLE radicular symptoms today. Patient able to progress to hip and core stabilization exercises today without complaints of lower back pain but increased difficulty noted with isolated transverse recruitment. Patient remains guarded with most activities with increased right lateral shift and fear avoidance behaviors noted.   Medical Necessity is demonstrated by: challenged/ difficulty  to participate in daily activities and   to participate in vocational pursuits, Pain limits function of effected part for some activities, Requires skilled supervision to complete and progress treatment interventions and HEP.Pt demo good understanding of the education provided. Patient demonstrated good return demonstration of activities.Patient's tolerance to treatment was fair. Patient will continue to benefit from skilled PTintervention.Patient is making progress towards established goals.  New/Revised goals: no  Continue with established Plan of Care towards PT goals.       PLAN     Certification Period: 11/2/2017 to 2/2/2018  Recommended Treatment Plan: 2 times per week for 12 weeks: Manual Therapy, Neuromuscular Re-ed, Patient Education and Therapeutic Exercise  Other Recommendations: . Pt may be seen by PTA as part of the rehabilitation team.      Consuelo De Jesus PTA  11/21/2017

## 2017-11-27 ENCOUNTER — CLINICAL SUPPORT (OUTPATIENT)
Dept: REHABILITATION | Facility: HOSPITAL | Age: 52
End: 2017-11-27
Attending: ANESTHESIOLOGY
Payer: COMMERCIAL

## 2017-11-27 DIAGNOSIS — R52 PAIN RELATED TO IMPAIRED PHYSICAL MOBILITY: ICD-10-CM

## 2017-11-27 DIAGNOSIS — Z74.09 PAIN RELATED TO IMPAIRED PHYSICAL MOBILITY: ICD-10-CM

## 2017-11-27 DIAGNOSIS — M51.36 DDD (DEGENERATIVE DISC DISEASE), LUMBAR: ICD-10-CM

## 2017-11-27 DIAGNOSIS — Z74.09 IMPAIRED MOBILITY AND ACTIVITIES OF DAILY LIVING: ICD-10-CM

## 2017-11-27 DIAGNOSIS — Z78.9 IMPAIRED MOBILITY AND ACTIVITIES OF DAILY LIVING: ICD-10-CM

## 2017-11-27 DIAGNOSIS — M51.9 LUMBAR DISC DISEASE: ICD-10-CM

## 2017-11-27 DIAGNOSIS — M54.16 LUMBAR RADICULOPATHY: Primary | ICD-10-CM

## 2017-11-27 PROCEDURE — 97110 THERAPEUTIC EXERCISES: CPT | Mod: PN | Performed by: PHYSICAL THERAPIST

## 2017-11-27 NOTE — PROGRESS NOTES
"                                            Physical Therapy Daily Note           Name: Saul Hancock  [facility] Number: [id number]  Date of Treatment: 11/27/2017   Diagnosis:   Encounter Diagnoses   Name Primary?    Lumbar radiculopathy Yes    DDD (degenerative disc disease), lumbar     Lumbar disc disease     Impaired mobility and activities of daily living     Pain related to impaired physical mobility        Time in: 1315  Time Out: 1420  Total Treatment Time: 65    VISITS / MURO: 5/40 - 12/31/2017  BOLD=INDICATES ACTIVITIES PERFORMED.      Subjective  Patient states "that he is in less pain today than previously.  Says the pain is tolerable in the back and RLE.       Pain level -  3-4 /10  Objective    Treatment: Patient  was instructed in and performed therapeutic exercises to develop strength, ROM, flexibility, posture, core stabilization and pain reduction . Patient performed therapeutic exercises consisting of the following    Nu-step 10"  Leg press   Recumbent bike  Upright bike   UE ergometer  Treadmill   Elliptical       THERAPEUTIC EXERCISE  SUPINE  -sciatic nerve flossing 3@1'   -SKC 5" x12   -LTR  x20  -DKC alternate x15     SIDELYING  -left SL w/R - rot mobs x12   -open books x15  -flossing x20    PRONE  -prone w/right lateral shift 3'   -sustained exten w/R lateral shift  3'   -repeated exten  x30      STANDING.   -wall exten w/right lateral shift x12   -thread the needle thoracic rotation       SITTING   -back stretches   -dural flossing x10       MANUAL THERAPY  MODALITY  DIRECT EDUCATION:   :   Direct education 1-1 with PT spent with the patient  7'  minutes as to his ( condition) with emphasis on  management of his condition.  Advised that he will continue to encounter pain associated with activity but the goal is to reduce the intensity and location to more proximal from distal .    Patient was issued HEP   Written Home Exercises Reviewed.   Pt demo good understanding of the education " provided. Patient demonstrated good return demonstration of activities      Assessment  Patient completed his routine with chronic moaning. Requires verbal cueing for several activities that he has been performing which usually indicates a lack of compliance with his exercises at home. He verbalized that he had increased pain at the end of the session however he was ambulating no differently than when he arrived. He is not using an assistive device at this time.   Medical Necessity is demonstrated by:   challenged/ difficulty  to participate in daily activities and   to participate in vocational pursuits, Pain limits function of effected part for some activities, Requires skilled supervision to complete and progress treatment interventions and HEP.  Pt demo good understanding of the education provided. Patient demonstrated good return demonstration of activities.Patient's tolerance to treatment was fair. Patient will continue to benefit from skilled PTintervention.Patient is making progress towards established goals.  New/Revised goals: no  Continue with established Plan of Care towards PT goals.       PLAN     Certification Period: 11/2/2017 to 2/2/2018  Recommended Treatment Plan: 2 times per week for 12 weeks: Manual Therapy, Neuromuscular Re-ed, Patient Education and Therapeutic Exercise  Other Recommendations:   . Pt may be seen by PTA as part of the rehabilitation team.         Therapist: Montana Rosenberg, PT

## 2017-11-30 ENCOUNTER — OFFICE VISIT (OUTPATIENT)
Dept: PAIN MEDICINE | Facility: CLINIC | Age: 52
End: 2017-11-30
Payer: COMMERCIAL

## 2017-11-30 VITALS
BODY MASS INDEX: 31.52 KG/M2 | TEMPERATURE: 98 F | HEART RATE: 92 BPM | SYSTOLIC BLOOD PRESSURE: 150 MMHG | HEIGHT: 75 IN | RESPIRATION RATE: 16 BRPM | DIASTOLIC BLOOD PRESSURE: 90 MMHG | WEIGHT: 253.5 LBS

## 2017-11-30 DIAGNOSIS — G89.4 CHRONIC PAIN DISORDER: ICD-10-CM

## 2017-11-30 DIAGNOSIS — M47.816 LUMBAR SPONDYLOSIS: ICD-10-CM

## 2017-11-30 DIAGNOSIS — M51.16 LUMBAR DISC HERNIATION WITH RADICULOPATHY: ICD-10-CM

## 2017-11-30 DIAGNOSIS — M51.36 DDD (DEGENERATIVE DISC DISEASE), LUMBAR: ICD-10-CM

## 2017-11-30 DIAGNOSIS — M54.16 LUMBAR RADICULOPATHY: ICD-10-CM

## 2017-11-30 DIAGNOSIS — M54.16 LUMBAR RADICULOPATHY: Primary | ICD-10-CM

## 2017-11-30 PROCEDURE — 99999 PR PBB SHADOW E&M-EST. PATIENT-LVL IV: CPT | Mod: PBBFAC,,, | Performed by: NURSE PRACTITIONER

## 2017-11-30 PROCEDURE — 99213 OFFICE O/P EST LOW 20 MIN: CPT | Mod: S$GLB,,, | Performed by: NURSE PRACTITIONER

## 2017-11-30 RX ORDER — GABAPENTIN 300 MG/1
600 CAPSULE ORAL 3 TIMES DAILY
Qty: 180 CAPSULE | Refills: 2 | Status: SHIPPED | OUTPATIENT
Start: 2017-11-30 | End: 2018-01-03 | Stop reason: SDUPTHER

## 2017-11-30 RX ORDER — OXYCODONE AND ACETAMINOPHEN 10; 325 MG/1; MG/1
1 TABLET ORAL EVERY 6 HOURS PRN
Qty: 30 TABLET | Refills: 0 | Status: SHIPPED | OUTPATIENT
Start: 2017-11-30 | End: 2017-11-30 | Stop reason: SDUPTHER

## 2017-11-30 RX ORDER — OXYCODONE AND ACETAMINOPHEN 10; 325 MG/1; MG/1
1 TABLET ORAL EVERY 6 HOURS PRN
Qty: 30 TABLET | Refills: 0 | Status: SHIPPED | OUTPATIENT
Start: 2017-11-30 | End: 2018-01-03 | Stop reason: SDUPTHER

## 2017-11-30 NOTE — PATIENT INSTRUCTIONS
Gabapentin dose increase schedule:  - Start with 1 extra capsule at night for 7 days (4 total capsules daily)  - Then increase to 2 capsules in the morning, 1 in the daytime, and 2 at bedtime for 7 days (5 total capsules daily)  - Then increase to 2 capsules three times daily (6 total capsules daily)
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
## 2017-12-11 ENCOUNTER — INITIAL CONSULT (OUTPATIENT)
Dept: NEUROSURGERY | Facility: CLINIC | Age: 52
End: 2017-12-11
Payer: COMMERCIAL

## 2017-12-11 VITALS
BODY MASS INDEX: 31.71 KG/M2 | SYSTOLIC BLOOD PRESSURE: 151 MMHG | DIASTOLIC BLOOD PRESSURE: 97 MMHG | HEART RATE: 78 BPM | HEIGHT: 75 IN | WEIGHT: 255.06 LBS

## 2017-12-11 DIAGNOSIS — M51.16 LUMBAR DISC HERNIATION WITH RADICULOPATHY: Primary | ICD-10-CM

## 2017-12-11 DIAGNOSIS — M54.17 LUMBOSACRAL RADICULOPATHY AT S1: ICD-10-CM

## 2017-12-11 PROCEDURE — 99205 OFFICE O/P NEW HI 60 MIN: CPT | Mod: S$GLB,,, | Performed by: NEUROLOGICAL SURGERY

## 2017-12-11 PROCEDURE — 99999 PR PBB SHADOW E&M-EST. PATIENT-LVL III: CPT | Mod: PBBFAC,,, | Performed by: NEUROLOGICAL SURGERY

## 2017-12-11 NOTE — LETTER
December 11, 2017      Daly oMntalvo MD  [address]  [address]  [facility] [address]           [place] [facility]  [address], [address]  [facility] [address]  Phone: [phone]          Patient: Saul Hancock   MR Number: [id number]   YOB: 1965   Date of Visit: 12/11/2017       Dear Dr. Daly Montalvo:    Thank you for referring Saul Hancock to me for evaluation. Attached you will find relevant portions of my assessment and plan of care.    If you have questions, please do not hesitate to call me. I look forward to following Saul Hancock along with you.    Sincerely,    Adelso Hernandez MD    Enclosure  CC:  No Recipients    If you would like to receive this communication electronically, please contact [email or web address] or [phone] to request more information on [employer] access.    For providers and/or their staff who would like to refer a patient to [facility], please contact us through our one-stop-shop provider referral line, [facility], at [phone].    If you feel you have received this communication in error or would no longer like to receive these types of communications, please e-mail [email or web address]
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
## 2017-12-22 ENCOUNTER — TELEPHONE (OUTPATIENT)
Dept: NEUROSURGERY | Facility: CLINIC | Age: 52
End: 2017-12-22

## 2017-12-22 DIAGNOSIS — M54.16 LUMBAR RADICULOPATHY: Primary | ICD-10-CM

## 2017-12-22 DIAGNOSIS — M51.06 INTERVERTEBRAL LUMBAR DISC DISORDER WITH MYELOPATHY, LUMBAR REGION: ICD-10-CM

## 2017-12-27 ENCOUNTER — ANESTHESIA EVENT (OUTPATIENT)
Dept: SURGERY | Facility: HOSPITAL | Age: 52
End: 2017-12-27
Payer: COMMERCIAL

## 2017-12-27 DIAGNOSIS — M79.604 PAIN OF RIGHT LOWER EXTREMITY: Primary | ICD-10-CM

## 2017-12-27 NOTE — ANESTHESIA PREPROCEDURE EVALUATION
Anesthesia Assessment: Preoperative EQUATION    Planned Procedure: Procedure(s) (LRB):  MICRODISCECTOMY Right L5-S1 Microdiscectomy (Right)  Requested Anesthesia Type:General  Surgeon: Adelso Hernandez MD  Service: Neurosurgery  Known or anticipated Date of Surgery:1/19/2018  Optimization:  Anesthesia Preop Clinic Assessment NOT  Indicated  Plan:    Testing:  Hematology Profile, BMP, PT/INR, PTT and UA    PT IS A [facility]OVA[facility] WITNESS       Consultation:Patient's PCP for a statement of optimization      Patient  has previously scheduled Medical Appointment: 12/29   52 y.o. male with planned surgery as above.    Known risk factors for perioperative complications: {risk factors None          Kenny Dasilva Jr., MD Encounter Date: 12/29/2017 1:00 PM        Office Visit on 12/29/2017            Detailed Report          Navigation: Tests Scheduled.              Consults scheduled.             Results will be tracked by Preop Clinic.                           No anesthesia preop clinic visit.                                    NICOLLE GOMEZ 12/27    [facility]  Anesthesia Pre-Operative Evaluation         Patient Name: Saul Hancock  YOB: 1965  MRN: [id number]    SUBJECTIVE:     Pre-operative evaluation for Procedure(s) (LRB):  MICRODISCECTOMY Right L5-S1 Microdiscectomy (Right)  Scheduled for 1/19/2018    HPI 01/18/2018:  Saul Hancock is a 52 y.o. male with hx of anxiety and arthritis.    Patient presents for the above procedure(s).    Prev airway:   No prior records in Epic or Legacy Documents.    Oxygen/Ventilation Requirements:  On room air       Patient Active Problem List   Diagnosis    Cervical radiculopathy    DDD (degenerative disc disease), lumbar    Facet arthropathy, lumbar    Lumbar disc herniation with radiculopathy    Chronic pain syndrome    Impaired mobility and activities of daily living    Spinal stenosis of lumbar region without neurogenic claudication  "      Review of patient's allergies indicates:   Allergen Reactions    Corticosteroids (glucocorticoids)      "Psychosis"       Outpatient Medications:  No current facility-administered medications on file prior to encounter.      Current Outpatient Prescriptions on File Prior to Encounter   Medication Sig Dispense Refill    naproxen (NAPROSYN) 500 MG tablet Take 500 mg by mouth once as needed.      oxaprozin (DAYPRO) 600 mg tablet Take 1 tablet (600 mg total) by mouth once daily. 20 tablet 0    oxycodone-acetaminophen (PERCOCET) 5-325 mg per tablet Take 1 tablet by mouth every 6 (six) hours as needed for Pain. 12 tablet 0    lurasidone (LATUDA) 40 mg Tab tablet Take 40 mg by mouth nightly.      methocarbamol (ROBAXIN) 500 MG Tab Take 500 mg by mouth 2 (two) times daily.          Current Inpatient Medications:      Past Surgical History:   Procedure Laterality Date    LUMBAR EPIDURAL INJECTION  11/14/2017       Social History     Social History    Marital status:      Spouse name: N/A    Number of children: 3    Years of education: N/A     Occupational History          NOFD     Social History Main Topics    Smoking status: Never Smoker    Smokeless tobacco: Never Used    Alcohol use No    Drug use: No    Sexual activity: Yes     Partners: Female     Other Topics Concern    Not on file     Social History Narrative    The patient does not exercise regularly ().      He is not satisfied with weight.    Rates diet as good.    He does not drink at least 1/2 gallon water daily.    He drinks 2-3 coffee/tea/caffeine-containing soft drinks daily.    Total sleep time at night is 8 hours.    He works 40 hours per week.    He does wear seat belts.    Hobbies include none.           OBJECTIVE:     Weight:  Wt Readings from Last 4 Encounters:   01/03/18 117 kg (257 lb 15 oz)   12/29/17 116.9 kg (257 lb 12.8 oz)   12/11/17 115.7 kg (255 lb 1.2 oz)   11/30/17 115 kg (253 lb 8.5 oz) "       Vital Signs Range (Last 24H):         CBC:   No results for input(s): WBC, RBC, HGB, HCT, PLT, MCV, MCH, MCHC in the last 72 hours.    CMP: No results for input(s): NA, K, CL, CO2, BUN, CREATININE, GLU, MG, PHOS, CALCIUM, ALBUMIN, PROT, ALKPHOS, ALT, AST, BILITOT in the last 72 hours.    INR:  No results for input(s): INR, PROTIME, APTT in the last 72 hours.    Diagnostic Studies:  CXR 2018  No acute cardiopulmonary disease    MRI Lumbar Spine 10/27/2017  Lumbar spondylosis as above, most significant for large right paracentral disc extrusion at L5-S1 resulting in spinal canal stenosis and impinging upon the descending right S1 nerve root.     EK2017  Vent. Rate : 074 BPM     Atrial Rate : 074 BPM     P-R Int : 164 ms          QRS Dur : 084 ms      QT Int : 376 ms       P-R-T Axes : 064 051 044 degrees     QTc Int : 417 ms    Normal sinus rhythm  Normal ECG  When compared with ECG of 2014 17:43,  No significant change was found     2D Echo:  No results found for this or any previous visit.      ASSESSMENT/PLAN:         Anesthesia Evaluation    I have reviewed the Patient Summary Reports.     I have reviewed the Medications.     Review of Systems  Anesthesia Hx:  No problems with previous Anesthesia  History of prior surgery of interest to airway management or planning: Denies Family Hx of Anesthesia complications.   Denies Personal Hx of Anesthesia complications.   Social:  Non-Smoker, No Alcohol Use    Hematology/Oncology:  Hematology Normal   Oncology Normal     Cardiovascular:   Exercise tolerance: good    Pulmonary:  Pulmonary Normal    Renal/:  Renal/ Normal     Hepatic/GI:   GERD, well controlled    Musculoskeletal:   Arthritis     Neurological:   Cervical radiculopathy  Lumbar disc herniation with radiculopathy  sciatica   Endocrine:  Endocrine Normal    Psych:   anxiety depression             Anesthesia Plan  Type of Anesthesia, risks & benefits discussed:  Anesthesia Type:   general  Patient's Preference:   Intra-op Monitoring Plan: standard ASA monitors  Intra-op Monitoring Plan Comments:   Post Op Pain Control Plan: multimodal analgesia, IV/PO Opioids PRN and per primary service following discharge from PACU  Post Op Pain Control Plan Comments:   Induction:   IV  Beta Blocker:  Patient is not currently on a Beta-Blocker (No further documentation required).       Informed Consent: Patient understands risks and agrees with Anesthesia plan.  Questions answered. Anesthesia consent signed with patient.  ASA Score: 2     Day of Surgery Review of History & Physical: I have interviewed and examined the patient. I have reviewed the patient's H&P dated:  There are no significant changes.  H&P update referred to the surgeon.         Ready For Surgery From Anesthesia Perspective.

## 2017-12-27 NOTE — PRE ADMISSION SCREENING
Anesthesia Assessment: Preoperative EQUATION    Planned Procedure: Procedure(s) (LRB):  MICRODISCECTOMY Right L5-S1 Microdiscectomy (Right)  Requested Anesthesia Type:General  Surgeon: Adelso Hernandez MD  Service: Neurosurgery  Known or anticipated Date of Surgery:1/19/2018  Optimization:  Anesthesia Preop Clinic Assessment NOT  Indicated  Plan:    Testing:  Hematology Profile, BMP, PT/INR, PTT and UA     Consultation:Patient's PCP for a statement of optimization      Patient  has previously scheduled Medical Appointment: 12/29    Navigation: Tests Scheduled.              Consults scheduled.             Results will be tracked by Preop Clinic.                           No anesthesia preop clinic visit.

## 2017-12-29 ENCOUNTER — OFFICE VISIT (OUTPATIENT)
Dept: FAMILY MEDICINE | Facility: CLINIC | Age: 52
End: 2017-12-29
Attending: FAMILY MEDICINE
Payer: COMMERCIAL

## 2017-12-29 VITALS
OXYGEN SATURATION: 97 % | SYSTOLIC BLOOD PRESSURE: 154 MMHG | HEIGHT: 75 IN | WEIGHT: 257.81 LBS | BODY MASS INDEX: 32.06 KG/M2 | DIASTOLIC BLOOD PRESSURE: 96 MMHG | HEART RATE: 90 BPM

## 2017-12-29 DIAGNOSIS — Z11.59 NEED FOR HEPATITIS C SCREENING TEST: ICD-10-CM

## 2017-12-29 DIAGNOSIS — M48.061 SPINAL STENOSIS OF LUMBAR REGION WITHOUT NEUROGENIC CLAUDICATION: ICD-10-CM

## 2017-12-29 DIAGNOSIS — M51.16 LUMBAR DISC HERNIATION WITH RADICULOPATHY: ICD-10-CM

## 2017-12-29 DIAGNOSIS — Z01.818 PREOP EXAMINATION: Primary | ICD-10-CM

## 2017-12-29 DIAGNOSIS — M51.36 DDD (DEGENERATIVE DISC DISEASE), LUMBAR: ICD-10-CM

## 2017-12-29 DIAGNOSIS — Z12.11 SCREEN FOR COLON CANCER: ICD-10-CM

## 2017-12-29 DIAGNOSIS — Z00.00 LABORATORY EXAM ORDERED AS PART OF ROUTINE GENERAL MEDICAL EXAMINATION: ICD-10-CM

## 2017-12-29 DIAGNOSIS — I10 BENIGN HYPERTENSION: ICD-10-CM

## 2017-12-29 PROBLEM — G89.4 CHRONIC PAIN SYNDROME: Status: ACTIVE | Noted: 2017-11-10

## 2017-12-29 PROCEDURE — 93010 ELECTROCARDIOGRAM REPORT: CPT | Mod: S$GLB,,, | Performed by: INTERNAL MEDICINE

## 2017-12-29 PROCEDURE — 93005 ELECTROCARDIOGRAM TRACING: CPT | Mod: S$GLB,,, | Performed by: FAMILY MEDICINE

## 2017-12-29 PROCEDURE — 90715 TDAP VACCINE 7 YRS/> IM: CPT | Mod: S$GLB,,, | Performed by: FAMILY MEDICINE

## 2017-12-29 PROCEDURE — 99999 PR PBB SHADOW E&M-EST. PATIENT-LVL IV: CPT | Mod: PBBFAC,,, | Performed by: FAMILY MEDICINE

## 2017-12-29 PROCEDURE — 99205 OFFICE O/P NEW HI 60 MIN: CPT | Mod: 25,S$GLB,, | Performed by: FAMILY MEDICINE

## 2017-12-29 PROCEDURE — 3008F BODY MASS INDEX DOCD: CPT | Mod: S$GLB,,, | Performed by: FAMILY MEDICINE

## 2017-12-29 PROCEDURE — 90471 IMMUNIZATION ADMIN: CPT | Mod: S$GLB,,, | Performed by: FAMILY MEDICINE

## 2017-12-29 NOTE — PROGRESS NOTES
Subjective:      Saul Hancock is a 52 y.o. male who presents to the office today for a preoperative consultation at the request of surgeon Dr. Adelso Hernandez who plans on performing microsurgical discectomy on January 18. This consultation is requested for the specific conditions prompting preoperative evaluation (i.e. because of potential affect on operative risk): hypertension. Planned anesthesia: general. The patient has the following known anesthesia issues: none. Patients bleeding risk: no recent abnormal bleeding, no remote history of abnormal bleeding and no use of Ca-channel blockers. Patient does have objections to receiving blood products if needed.      Patient Active Problem List   Diagnosis    Cervical radiculopathy    DDD (degenerative disc disease), lumbar    Facet arthropathy, lumbar    Lumbar disc herniation with radiculopathy    Chronic pain syndrome    Impaired mobility and activities of daily living    Spinal stenosis of lumbar region without neurogenic claudication       Past Surgical History:   Procedure Laterality Date    LUMBAR EPIDURAL INJECTION  11/14/2017       Current Outpatient Prescriptions:     gabapentin (NEURONTIN) 300 MG capsule, Take 2 capsules (600 mg total) by mouth 3 (three) times daily., Disp: 180 capsule, Rfl: 2    oxyCODONE-acetaminophen (PERCOCET)  mg per tablet, Take 1 tablet by mouth every 6 (six) hours as needed for Pain., Disp: 30 tablet, Rfl: 0    oxycodone-acetaminophen (PERCOCET) 5-325 mg per tablet, Take 1 tablet by mouth every 6 (six) hours as needed for Pain., Disp: 12 tablet, Rfl: 0    lurasidone (LATUDA) 40 mg Tab tablet, Take 40 mg by mouth nightly., Disp: , Rfl:     methocarbamol (ROBAXIN) 500 MG Tab, Take 500 mg by mouth 2 (two) times daily., Disp: , Rfl:     naproxen (NAPROSYN) 500 MG tablet, Take 500 mg by mouth once as needed., Disp: , Rfl:     oxaprozin (DAYPRO) 600 mg tablet, Take 1 tablet (600 mg total) by mouth once daily.,  "Disp: 20 tablet, Rfl: 0    Review of patient's allergies indicates:   Allergen Reactions    Corticosteroids (glucocorticoids)      "Psychosis"       Family History   Problem Relation Age of Onset    Hypertension Mother     Lung cancer Father     No Known Problems Sister     Leukemia Brother     No Known Problems Brother     No Known Problems Brother        Social History     Social History    Marital status:      Spouse name: N/A    Number of children: 3    Years of education: N/A     Occupational History          NOFD     Social History Main Topics    Smoking status: Never Smoker    Smokeless tobacco: Never Used    Alcohol use No    Drug use: No    Sexual activity: Yes     Partners: Female     Other Topics Concern    Not on file     Social History Narrative    The patient does not exercise regularly ().      He is not satisfied with weight.    Rates diet as good.    He does not drink at least 1/2 gallon water daily.    He drinks 2-3 coffee/tea/caffeine-containing soft drinks daily.    Total sleep time at night is 8 hours.    He works 40 hours per week.    He does wear seat belts.    Hobbies include none.           Patient Care Team:  Primary Doctor No as PCP - General    Review of Systems  A comprehensive review of systems was negative except for: Behavioral/Psych: positive for sleep disturbance (snores, worse with weight gain)     Objective:      BP (!) 154/96 (BP Location: Left arm, Patient Position: Sitting, BP Method: Small (Manual))   Pulse 90   Ht 6' 3" (1.905 m)   Wt 116.9 kg (257 lb 12.8 oz)   SpO2 97%   BMI 32.22 kg/m²     General Appearance:    Alert, cooperative, no distress, appears stated age   Head:    Normocephalic, without obvious abnormality, atraumatic   Eyes:    PERRL, conjunctiva/corneas clear, EOM's intact, fundi     benign, both eyes        Ears:    Normal TM's and external ear canals, both ears   Nose:   Nares normal, septum midline, mucosa " normal, no drainage    or sinus tenderness   Throat:   Lips, mucosa, and tongue normal; teeth and gums normal   Neck:   Supple, symmetrical, trachea midline, no adenopathy;        thyroid:  No enlargement/tenderness/nodules; no carotid    bruit or JVD   Back:     Symmetric, no curvature, ROM normal, no CVA tenderness   Lungs:     Clear to auscultation bilaterally, respirations unlabored   Chest wall:    No tenderness or deformity   Heart:    Regular rate and rhythm, S1 and S2 normal, no murmur, rub   or gallop   Abdomen:     Soft, non-tender, bowel sounds active all four quadrants,     no masses, no organomegaly   Extremities:   Extremities normal, atraumatic, no cyanosis or edema   Pulses:   2+ and symmetric all extremities   Skin:   Skin color, texture, turgor normal, no rashes or lesions   Lymph nodes:   Cervical, supraclavicular, and axillary nodes normal   Neurologic:   CNII-XII intact. Normal strength, sensation and reflexes       throughout       Predictors of intubation difficulty:   Morbid obesity? no   Anatomically abnormal facies? no   Prominent incisors? no   Receding mandible? no   Short, thick neck? no   Neck range of motion: normal   Mallampati score: III (soft and hard palate and base of uvula visible)   Thyromental distance: 8 cm   Mouth openincm   Dentition: No chipped, loose, or missing teeth.    Cardiographics  ECG: normal sinus rhythm, no blocks or conduction defects, no ischemic changes  Echocardiogram: not done    Imaging  Chest x-ray 2017: normal     Lab Review:  Results for BRANDAN LUONG (MRN [id number]) as of 1/3/2018 14:33   Ref. Range 2018 07:06   WBC Latest Ref Range: 3.90 - 12.70 K/uL 10.24   RBC Latest Ref Range: 4.60 - 6.20 M/uL 5.18   Hemoglobin Latest Ref Range: 14.0 - 18.0 g/dL 14.8   Hematocrit Latest Ref Range: 40.0 - 54.0 % 45.8   MCV Latest Ref Range: 82 - 98 fL 88   MCH Latest Ref Range: 27.0 - 31.0 pg 28.6   MCHC Latest Ref Range: 32.0 - 36.0 g/dL 32.3   RDW  Latest Ref Range: 11.5 - 14.5 % 13.5   Platelets Latest Ref Range: 150 - 350 K/uL 286   MPV Latest Ref Range: 9.2 - 12.9 fL 10.5   Gran% Latest Ref Range: 38.0 - 73.0 % 65.5   Gran # Latest Ref Range: 1.8 - 7.7 K/uL 6.7   Immature Granulocytes Latest Ref Range: 0.0 - 0.5 % 0.2   Immature Grans (Abs) Latest Ref Range: 0.00 - 0.04 K/uL 0.02   Lymph% Latest Ref Range: 18.0 - 48.0 % 24.7   Lymph # Latest Ref Range: 1.0 - 4.8 K/uL 2.5   Mono% Latest Ref Range: 4.0 - 15.0 % 7.7   Mono # Latest Ref Range: 0.3 - 1.0 K/uL 0.8   Eosinophil% Latest Ref Range: 0.0 - 8.0 % 1.3   Eos # Latest Ref Range: 0.0 - 0.5 K/uL 0.1   Basophil% Latest Ref Range: 0.0 - 1.9 % 0.6   Baso # Latest Ref Range: 0.00 - 0.20 K/uL 0.06   nRBC Latest Ref Range: 0 /100 WBC 0   Sodium Latest Ref Range: 136 - 145 mmol/L 142   Potassium Latest Ref Range: 3.5 - 5.1 mmol/L 4.8   Chloride Latest Ref Range: 95 - 110 mmol/L 106   CO2 Latest Ref Range: 23 - 29 mmol/L 29   Anion Gap Latest Ref Range: 8 - 16 mmol/L 7 (L)   BUN, Bld Latest Ref Range: 6 - 20 mg/dL 16   Creatinine Latest Ref Range: 0.5 - 1.4 mg/dL 1.2   eGFR if non African American Latest Ref Range: >60 mL/min/1.73 m^2 >60.0   eGFR if African American Latest Ref Range: >60 mL/min/1.73 m^2 >60.0   Glucose Latest Ref Range: 70 - 110 mg/dL 86   Calcium Latest Ref Range: 8.7 - 10.5 mg/dL 9.7   Alkaline Phosphatase Latest Ref Range: 55 - 135 U/L 85   Total Protein Latest Ref Range: 6.0 - 8.4 g/dL 7.6   Albumin Latest Ref Range: 3.5 - 5.2 g/dL 3.6   Total Bilirubin Latest Ref Range: 0.1 - 1.0 mg/dL 0.4   AST Latest Ref Range: 10 - 40 U/L 27   ALT Latest Ref Range: 10 - 44 U/L 36   Triglycerides Latest Ref Range: 30 - 150 mg/dL 122   Cholesterol Latest Ref Range: 120 - 199 mg/dL 213 (H)   HDL Latest Ref Range: 40 - 75 mg/dL 38 (L)   LDL Cholesterol Latest Ref Range: 63.0 - 159.0 mg/dL 150.6   Total Cholesterol/HDL Ratio Latest Ref Range: 2.0 - 5.0  5.6 (H)   TSH Latest Ref Range: 0.400 - 4.000 uIU/mL 0.761    Free T4 Latest Ref Range: 0.71 - 1.51 ng/dL 1.04   PSA, SCREEN Latest Ref Range: 0.00 - 4.00 ng/mL 0.61   Hepatitis C Ab Unknown Negative   Microalbum.,U,Random Latest Units: ug/mL 5.0   Microalb Creat Ratio Latest Ref Range: 0.0 - 30.0 ug/mg 3.0          Assessment:        52 y.o. male with planned surgery as above.    Known risk factors for perioperative complications: None    Difficulty with intubation is not anticipated.    1. Preop examination  CBC auto differential    Comprehensive metabolic panel    EKG 12-lead    X-Ray Chest PA And Lateral   2. Lumbar disc herniation with radiculopathy     3. DDD (degenerative disc disease), lumbar     4. Spinal stenosis of lumbar region without neurogenic claudication     5. Benign hypertension  Microalbumin/creatinine urine ratio    Microalbumin/creatinine urine ratio   6. Need for hepatitis C screening test  Hepatitis C antibody   7. Screen for colon cancer     8. Laboratory exam ordered as part of routine general medical examination  Lipid panel    CBC auto differential    Comprehensive metabolic panel    TSH    T4, free    PSA, Screening    Fecal Immunochemical Test (iFOBT)          Plan:      1. Preoperative workup as follows none.  2. Change in medication regimen before surgery: discontinue ASA 14 days before surgery and discontinue NSAIDs 14 days before surgery.  3. Prophylaxis for cardiac events with perioperative beta-blockers: not indicated.  4. Invasive hemodynamic monitoring perioperatively: not indicated.  5. Deep vein thrombosis prophylaxis postoperatively:regimen to be chosen by surgical team.  6. Surveillance for postoperative MI with ECG immediately postoperatively and on postoperative days 1 and 2 AND troponin levels 24 hours postoperatively and on day 4 or hospital discharge (whichever comes first): not indicated.    The patient is cleared for surgery.

## 2018-01-02 ENCOUNTER — HOSPITAL ENCOUNTER (OUTPATIENT)
Dept: RADIOLOGY | Facility: OTHER | Age: 53
Discharge: HOME | End: 2018-01-02
Attending: FAMILY MEDICINE
Payer: COMMERCIAL

## 2018-01-02 DIAGNOSIS — Z01.818 PREOP EXAMINATION: ICD-10-CM

## 2018-01-02 PROCEDURE — 71046 X-RAY EXAM CHEST 2 VIEWS: CPT | Mod: TC,FY

## 2018-01-02 PROCEDURE — 71046 X-RAY EXAM CHEST 2 VIEWS: CPT | Mod: FY,,, | Performed by: RADIOLOGY

## 2018-01-03 ENCOUNTER — OFFICE VISIT (OUTPATIENT)
Dept: PAIN MEDICINE | Facility: CLINIC | Age: 53
End: 2018-01-03
Payer: COMMERCIAL

## 2018-01-03 VITALS
WEIGHT: 257.94 LBS | DIASTOLIC BLOOD PRESSURE: 90 MMHG | HEART RATE: 90 BPM | BODY MASS INDEX: 32.07 KG/M2 | SYSTOLIC BLOOD PRESSURE: 160 MMHG | TEMPERATURE: 99 F | RESPIRATION RATE: 16 BRPM | HEIGHT: 75 IN

## 2018-01-03 DIAGNOSIS — M51.36 DDD (DEGENERATIVE DISC DISEASE), LUMBAR: ICD-10-CM

## 2018-01-03 DIAGNOSIS — M51.16 LUMBAR DISC HERNIATION WITH RADICULOPATHY: ICD-10-CM

## 2018-01-03 DIAGNOSIS — G89.4 CHRONIC PAIN DISORDER: ICD-10-CM

## 2018-01-03 DIAGNOSIS — M47.816 LUMBAR SPONDYLOSIS: ICD-10-CM

## 2018-01-03 DIAGNOSIS — M54.16 LUMBAR RADICULOPATHY: Primary | ICD-10-CM

## 2018-01-03 PROCEDURE — 99213 OFFICE O/P EST LOW 20 MIN: CPT | Mod: S$GLB,,, | Performed by: NURSE PRACTITIONER

## 2018-01-03 PROCEDURE — 99999 PR PBB SHADOW E&M-EST. PATIENT-LVL III: CPT | Mod: PBBFAC,,, | Performed by: NURSE PRACTITIONER

## 2018-01-03 RX ORDER — OXYCODONE AND ACETAMINOPHEN 10; 325 MG/1; MG/1
1 TABLET ORAL EVERY 6 HOURS PRN
Qty: 30 TABLET | Refills: 0 | Status: ON HOLD | OUTPATIENT
Start: 2018-01-03 | End: 2018-01-19

## 2018-01-03 RX ORDER — GABAPENTIN 300 MG/1
600 CAPSULE ORAL 3 TIMES DAILY
Qty: 180 CAPSULE | Refills: 2 | Status: SHIPPED | OUTPATIENT
Start: 2018-01-03 | End: 2018-03-13 | Stop reason: SDUPTHER

## 2018-01-03 NOTE — PROGRESS NOTES
Chronic Pain - Follow Up    Referring Physician: No ref. provider found    Chief Complaint:   Chief Complaint   Patient presents with    Low-back Pain    Leg Pain        SUBJECTIVE: Disclaimer: This note has been generated using voice-recognition software. There may be typographical errors that have been missed during proof-reading    Interval History 1/3/2018:  The patient returns to clinic today for follow up. He has been evaluated by neurosurgery and will be having surgery on 1/19/2018. He continues to report low back pain that radiates down the back of both legs to his feet, right greater than left. He describes this pain as burning and shooting. He reports that this pain is worse with prolonged sitting and walking. He continues to take Gabapentin 600 mg TID with benefit. He also takes Percocet as needed with benefit. He denies any adverse effects with this medication. He denies any other health changes. He denies any bowel or bladder incontinence. His pain today is 5/10.    Interval History 11/30/2017:  The patient returns to clinic today for follow up. He is s/p right L5 and S1 TF NEELAM on 11/14/2017. He reports 40-50% relief of his low back and right leg pain. He reports that he is able to ambulate better since the procedure. He continues to report low back pain that radiates down the back of his right leg but in less intensity. He continues to take Gabapentin 300 mg TID with limited relief. He also take Percocet PRN with benefit. He denies any adverse effects. He is scheduled to see Neurosurgery for evaluation in 2 weeks. He denies any other health changes. He denies any bowel or bladder incontinence. His pain today is 6/10.    Interval history 11/07/2017  The patient returns to the clinic for a follow up visit, he is reporting back and right leg pain of 9/10 at this time.  X-ray lumbar spine did not reveal any evidence of instability.  X-ray of the cervical spine did not reveal any evidence of instability  although there is spondylosis and facet arthropathy throughout the cervical and lumbar spine.  MRI did reveal large disc herniation towards the right paracentrally with compression of the right S1 nerve root which corresponds with the patient's pain symptoms and weakness.    Initial encounter:    Saul Hancock presents to the clinic for the evaluation of Low back pain. The pain started over approximately 7 years ago following an accident and symptoms have been worsening.    Brief history:  Patient reports history of psychosis associated with multiple epidural steroid injections in the past he was started on Latuda after this with psychiatry at outside facility he states that he sees a psychiatrist regularly last visit was several months ago.  The patient also reports that he did discontinue all his medications including oxycodone acetaminophen oxaprozin and Latuda.  He states that he is currently not taking any medication    Pain Description:    The pain is located in the Low back  area and radiates to the Right lower extremity in the L5/S1 distribution.      At BEST  7/10     At WORST  10/10 on the WORST day.      On average pain is rated as 7/10.     Today the pain is rated as 8/10    The pain is described as burning, dull, numbing, sharp, shooting, throbbing, tight band and tingling      Symptoms interfere with daily activity, sleeping and work.     Exacerbating factors: Sitting, Standing, Laying, Bending, Coughing/Sneezing, Walking, Night Time, Morning, Lifting and Getting out of bed/chair.      Mitigating factors medications.     Patient denies night fever/night sweats, urinary incontinence, bowel incontinence, significant weight loss and significant motor weakness.  Patient denies any suicidal or homicidal ideations    Pain Medications:  Gabapentin   Percocet 10/325 mg    Tried in Past:  NSAIDs -Naproxen and mobic  TCA -Never  SNRI -Never  Anti-convulsants -Never  Muscle Relaxants -Robaxin    Opioids-oxycodone    Physical Therapy/Home Exercise: no       report:  Reviewed and consistent with medication use as prescribed.    Pain Procedures: had some in the past  11/14/2017- Right L5 and S1 TF NEELAM- without steroid    Chiropractor -never  Acupuncture - never  TENS unit -never  Spinal decompression -never  Joint replacement -never    Imaging:   MRI L SPINE L SPINE 10/23/2017      Lumbar spondylosis as above, most significant for large right paracentral disc extrusion at L5-S1 resulting in spinal canal stenosis and impinging upon the descending right S1 nerve root.         Electronically signed by: PAWAN HOPSON MD  Date: 10/28/17  Time: 01:21      XRAY C SPINE 10/23/2017  Impression       Cervical spondylosis C4-C5 and C5-C6.  No abnormal motion on flexion and extension.      Electronically signed by: LUCY HARTMANN MD  Date: 10/27/17  Time: 15:54        MRI lumbar spine 2012 a disc herniation with impingement and contact on the right S1 nerve root    MRI cervical spine 2012 multilevel neuroforaminal stenosis which was moderate to severe    X-ray Lumbar spine 10/20/2017  Findings:   Degenerative disc disease at L5-S1. No instability. Vertebral body heights are maintained. Paravertebral soft tissues are unremarkable.   Impression        Degenerative disc disease at L5-S1. No instability.       Past Medical History:   Diagnosis Date    Anxiety     Arthritis     Sciatica      Past Surgical History:   Procedure Laterality Date    LUMBAR EPIDURAL INJECTION  11/14/2017     Social History     Social History    Marital status:      Spouse name: N/A    Number of children: 3    Years of education: N/A     Occupational History          NOFD     Social History Main Topics    Smoking status: Never Smoker    Smokeless tobacco: Never Used    Alcohol use No    Drug use: No    Sexual activity: Yes     Partners: Female     Other Topics Concern    Not on file     Social History  "Narrative    The patient does not exercise regularly ().      He is not satisfied with weight.    Rates diet as good.    He does not drink at least 1/2 gallon water daily.    He drinks 2-3 coffee/tea/caffeine-containing soft drinks daily.    Total sleep time at night is 8 hours.    He works 40 hours per week.    He does wear seat belts.    Hobbies include none.         Family History   Problem Relation Age of Onset    Hypertension Mother     Lung cancer Father     No Known Problems Sister     Leukemia Brother     No Known Problems Brother     No Known Problems Brother        Review of patient's allergies indicates:   Allergen Reactions    Corticosteroids (glucocorticoids)      "Psychosis"       Current Outpatient Prescriptions   Medication Sig    lurasidone (LATUDA) 40 mg Tab tablet Take 40 mg by mouth nightly.    methocarbamol (ROBAXIN) 500 MG Tab Take 500 mg by mouth 2 (two) times daily.    naproxen (NAPROSYN) 500 MG tablet Take 500 mg by mouth once as needed.    oxaprozin (DAYPRO) 600 mg tablet Take 1 tablet (600 mg total) by mouth once daily.    oxyCODONE-acetaminophen (PERCOCET)  mg per tablet Take 1 tablet by mouth every 6 (six) hours as needed for Pain.    oxycodone-acetaminophen (PERCOCET) 5-325 mg per tablet Take 1 tablet by mouth every 6 (six) hours as needed for Pain.    gabapentin (NEURONTIN) 300 MG capsule Take 2 capsules (600 mg total) by mouth 3 (three) times daily.     No current facility-administered medications for this visit.        REVIEW OF SYSTEMS:    GENERAL:  No weight loss, malaise or fevers.  RESPIRATORY:  Negative for cough, wheezing or shortness of breath, patient denies any recent URI.  CARDIOVASCULAR:  Negative for chest pain, leg swelling or palpitations.  GI:  Negative for abdominal discomfort, blood in stools or black stools or change in bowel habits.  MUSCULOSKELETAL:  See HPI.  SKIN:  Negative for lesions, rash, and itching.  PSYCH: History of psychosis " "associated with steroid injections.  Patient's sleep is disturbed secondary to pain.  HEMATOLOGY/LYMPHOLOGY:  Negative for prolonged bleeding, bruising easily or swollen nodes.  Patient is not currently taking any anti-coagulants  ENDO: No history of diabetes or thyroid dysfunction  NEURO:   No history of headaches, syncope, paralysis, seizures or tremors.  All other reviewed and negative other than HPI.    OBJECTIVE:    BP (!) 160/90   Pulse 90   Temp 98.5 °F (36.9 °C) (Oral)   Resp 16   Ht 6' 3" (1.905 m)   Wt 117 kg (257 lb 15 oz)   BMI 32.24 kg/m²     PHYSICAL EXAMINATION:    GENERAL: Well appearing, in no acute distress, alert and oriented x3.  PSYCH:  Mood and affect appropriate.  SKIN: Skin color, texture, turgor normal, no rashes or lesions.  HEAD/FACE:  Normocephalic, atraumatic. Cranial nerves grossly intact.  CV: RRR with palpation of the radial artery.  PULM: No evidence of respiratory difficulty, symmetric chest rise.  NECK:  Spurling negative, normal range of motion of neck without reproduction of pain.  BACK: Straight leg raising in the sitting and supine positions is positive to radicular pain bilaterally in the L5 distribution. There is pain with palpation over the facet joints of the lumbar spine bilaterally. Limited ROM with pain on flexion and extension. Positive facet loading bilaterally.     EXTREMITIES: Peripheral joint ROM is full and pain free without obvious instability or laxity in all four extremities. No deformities, edema, or skin discoloration. Good capillary refill.  MUSCULOSKELETAL: Hip, and knee provocative maneuvers are negative.  There is pain with palpation over the sacroiliac joints bilaterally.  There is no pain to palpation over the greater trochanteric bursa bilaterally.  FABERs test is positive bilaterally.  FADIRs test is negative.  Bilateral upper extremity hand strength is normal and symmetric.  4/5 strength in right ankle with plantar and 4/5 dorsiflexion, 5/5 " strength in left ankle with plantar and dorsiflexion, 5/5 strength with right knee flexion extension, 5/5 strength with knee flexion extension on the left .  No atrophy or tone abnormalities are noted.  NEURO: Bilateral upper and lower extremity coordination and muscle stretch reflexes are physiologic and symmetric.  Plantar response are downgoing. Valerio's negative. No clonus.  Decreased sensation light touch over the medial lateral aspect of the right foot  GAIT: Antalgic, ambulates without assistance.     ASSESSMENT: 52 y.o. year old male with pain, consistent with     Encounter Diagnoses   Name Primary?    Lumbar radiculopathy Yes    Lumbar disc herniation with radiculopathy     DDD (degenerative disc disease), lumbar     Lumbar spondylosis     Chronic pain disorder        PLAN:     - Previous imaging was reviewed and discussed with the patient today.    - He is s/p right L5 and S1 TF NEELAM without steroid with benefit. He is scheduled for surgery later this month.    - Continue Gabapentin 600 mg TID.     - Continue Percocet 10/325 every 6 hours PRN, #30, 0 refills. Medication management provided by Dr. Montalvo.     - RTC after released by neurosurgery.     - Counseled the patient on the importance of activity modification and constant sleeping habits.     - Dr. Montalvo was consulted on the patient and agrees with this plan.    The above plan and management options were discussed at length with patient. Patient is in agreement with the above and verbalized understanding.     Jerica Vasquez NP  01/03/2018

## 2018-01-10 ENCOUNTER — PATIENT MESSAGE (OUTPATIENT)
Dept: SURGERY | Facility: HOSPITAL | Age: 53
End: 2018-01-10

## 2018-01-18 ENCOUNTER — TELEPHONE (OUTPATIENT)
Dept: NEUROSURGERY | Facility: CLINIC | Age: 53
End: 2018-01-18

## 2018-01-18 NOTE — TELEPHONE ENCOUNTER
Spoke with patient, instructed to arrive 2 hours prior to surgery time, NPO after midnight. Verbalizes understanding.

## 2018-01-19 ENCOUNTER — HOSPITAL ENCOUNTER (OUTPATIENT)
Facility: HOSPITAL | Age: 53
Discharge: HOME | End: 2018-01-19
Attending: NEUROLOGICAL SURGERY | Admitting: NEUROLOGICAL SURGERY
Payer: COMMERCIAL

## 2018-01-19 ENCOUNTER — ANESTHESIA (OUTPATIENT)
Dept: SURGERY | Facility: HOSPITAL | Age: 53
End: 2018-01-19
Payer: COMMERCIAL

## 2018-01-19 VITALS
SYSTOLIC BLOOD PRESSURE: 162 MMHG | BODY MASS INDEX: 31.95 KG/M2 | TEMPERATURE: 98 F | HEART RATE: 88 BPM | DIASTOLIC BLOOD PRESSURE: 85 MMHG | HEIGHT: 75 IN | RESPIRATION RATE: 18 BRPM | WEIGHT: 257 LBS | OXYGEN SATURATION: 100 %

## 2018-01-19 DIAGNOSIS — G89.4 CHRONIC PAIN DISORDER: ICD-10-CM

## 2018-01-19 DIAGNOSIS — M54.16 LUMBAR RADICULOPATHY: ICD-10-CM

## 2018-01-19 DIAGNOSIS — M51.36 DDD (DEGENERATIVE DISC DISEASE), LUMBAR: ICD-10-CM

## 2018-01-19 DIAGNOSIS — M54.10 RADICULOPATHY: ICD-10-CM

## 2018-01-19 DIAGNOSIS — M51.16 LUMBAR DISC HERNIATION WITH RADICULOPATHY: ICD-10-CM

## 2018-01-19 DIAGNOSIS — M47.816 LUMBAR SPONDYLOSIS: ICD-10-CM

## 2018-01-19 LAB
APTT BLDCRRT: 27.2 SEC
BASOPHILS # BLD AUTO: 0.04 K/UL
BASOPHILS NFR BLD: 0.5 %
DIFFERENTIAL METHOD: NORMAL
EOSINOPHIL # BLD AUTO: 0.1 K/UL
EOSINOPHIL NFR BLD: 1 %
ERYTHROCYTE [DISTWIDTH] IN BLOOD BY AUTOMATED COUNT: 12.9 %
HCT VFR BLD AUTO: 42.5 %
HGB BLD-MCNC: 14.5 G/DL
IMM GRANULOCYTES # BLD AUTO: 0.03 K/UL
IMM GRANULOCYTES NFR BLD AUTO: 0.4 %
INR PPP: 0.9
LYMPHOCYTES # BLD AUTO: 1.9 K/UL
LYMPHOCYTES NFR BLD: 24 %
MCH RBC QN AUTO: 28.4 PG
MCHC RBC AUTO-ENTMCNC: 34.1 G/DL
MCV RBC AUTO: 83 FL
MONOCYTES # BLD AUTO: 0.6 K/UL
MONOCYTES NFR BLD: 7 %
NEUTROPHILS # BLD AUTO: 5.3 K/UL
NEUTROPHILS NFR BLD: 67.1 %
NRBC BLD-RTO: 0 /100 WBC
PLATELET # BLD AUTO: 269 K/UL
PMV BLD AUTO: 10 FL
PROTHROMBIN TIME: 10 SEC
RBC # BLD AUTO: 5.1 M/UL
WBC # BLD AUTO: 7.91 K/UL

## 2018-01-19 PROCEDURE — 27201423 OPTIME MED/SURG SUP & DEVICES STERILE SUPPLY: Performed by: NEUROLOGICAL SURGERY

## 2018-01-19 PROCEDURE — 25000003 PHARM REV CODE 250: Performed by: STUDENT IN AN ORGANIZED HEALTH CARE EDUCATION/TRAINING PROGRAM

## 2018-01-19 PROCEDURE — D9220A PRA ANESTHESIA: Mod: ,,, | Performed by: ANESTHESIOLOGY

## 2018-01-19 PROCEDURE — 63600175 PHARM REV CODE 636 W HCPCS: Performed by: STUDENT IN AN ORGANIZED HEALTH CARE EDUCATION/TRAINING PROGRAM

## 2018-01-19 PROCEDURE — 36000711: Performed by: NEUROLOGICAL SURGERY

## 2018-01-19 PROCEDURE — 63030 LAMOT DCMPRN NRV RT 1 LMBR: CPT | Mod: RT,,, | Performed by: NEUROLOGICAL SURGERY

## 2018-01-19 PROCEDURE — 36000710: Performed by: NEUROLOGICAL SURGERY

## 2018-01-19 PROCEDURE — 37000008 HC ANESTHESIA 1ST 15 MINUTES: Performed by: NEUROLOGICAL SURGERY

## 2018-01-19 PROCEDURE — 63600175 PHARM REV CODE 636 W HCPCS: Performed by: NEUROLOGICAL SURGERY

## 2018-01-19 PROCEDURE — 71000015 HC POSTOP RECOV 1ST HR: Performed by: NEUROLOGICAL SURGERY

## 2018-01-19 PROCEDURE — 94761 N-INVAS EAR/PLS OXIMETRY MLT: CPT

## 2018-01-19 PROCEDURE — 25000003 PHARM REV CODE 250: Performed by: NEUROLOGICAL SURGERY

## 2018-01-19 PROCEDURE — 27000221 HC OXYGEN, UP TO 24 HOURS

## 2018-01-19 PROCEDURE — 25000003 PHARM REV CODE 250: Performed by: PHYSICIAN ASSISTANT

## 2018-01-19 PROCEDURE — 71000039 HC RECOVERY, EACH ADD'L HOUR: Performed by: NEUROLOGICAL SURGERY

## 2018-01-19 PROCEDURE — 71000016 HC POSTOP RECOV ADDL HR: Performed by: NEUROLOGICAL SURGERY

## 2018-01-19 PROCEDURE — 37000009 HC ANESTHESIA EA ADD 15 MINS: Performed by: NEUROLOGICAL SURGERY

## 2018-01-19 PROCEDURE — 85610 PROTHROMBIN TIME: CPT

## 2018-01-19 PROCEDURE — 85025 COMPLETE CBC W/AUTO DIFF WBC: CPT

## 2018-01-19 PROCEDURE — 71000033 HC RECOVERY, INTIAL HOUR: Performed by: NEUROLOGICAL SURGERY

## 2018-01-19 PROCEDURE — 85730 THROMBOPLASTIN TIME PARTIAL: CPT

## 2018-01-19 RX ORDER — NEOSTIGMINE METHYLSULFATE 1 MG/ML
INJECTION, SOLUTION INTRAVENOUS
Status: DISCONTINUED | OUTPATIENT
Start: 2018-01-19 | End: 2018-01-19

## 2018-01-19 RX ORDER — FENTANYL CITRATE 50 UG/ML
25 INJECTION, SOLUTION INTRAMUSCULAR; INTRAVENOUS EVERY 5 MIN PRN
Status: COMPLETED | OUTPATIENT
Start: 2018-01-19 | End: 2018-01-19

## 2018-01-19 RX ORDER — MIDAZOLAM HYDROCHLORIDE 5 MG/ML
INJECTION INTRAMUSCULAR; INTRAVENOUS
Status: DISCONTINUED | OUTPATIENT
Start: 2018-01-19 | End: 2018-01-19

## 2018-01-19 RX ORDER — BACITRACIN 50000 [IU]/1
INJECTION, POWDER, FOR SOLUTION INTRAMUSCULAR
Status: DISCONTINUED | OUTPATIENT
Start: 2018-01-19 | End: 2018-01-19 | Stop reason: HOSPADM

## 2018-01-19 RX ORDER — PROPOFOL 10 MG/ML
VIAL (ML) INTRAVENOUS
Status: DISCONTINUED | OUTPATIENT
Start: 2018-01-19 | End: 2018-01-19

## 2018-01-19 RX ORDER — OXYCODONE HYDROCHLORIDE 5 MG/1
10 TABLET ORAL EVERY 4 HOURS PRN
Status: DISCONTINUED | OUTPATIENT
Start: 2018-01-19 | End: 2018-01-19 | Stop reason: HOSPADM

## 2018-01-19 RX ORDER — LABETALOL HYDROCHLORIDE 5 MG/ML
10 INJECTION, SOLUTION INTRAVENOUS EVERY 10 MIN PRN
Status: DISCONTINUED | OUTPATIENT
Start: 2018-01-19 | End: 2018-01-19 | Stop reason: HOSPADM

## 2018-01-19 RX ORDER — MUPIROCIN 20 MG/G
OINTMENT TOPICAL
Status: DISCONTINUED | OUTPATIENT
Start: 2018-01-19 | End: 2018-01-19

## 2018-01-19 RX ORDER — GLYCOPYRROLATE 0.2 MG/ML
INJECTION INTRAMUSCULAR; INTRAVENOUS
Status: DISCONTINUED | OUTPATIENT
Start: 2018-01-19 | End: 2018-01-19

## 2018-01-19 RX ORDER — CEFAZOLIN SODIUM 1 G/3ML
1 INJECTION, POWDER, FOR SOLUTION INTRAMUSCULAR; INTRAVENOUS
Status: DISCONTINUED | OUTPATIENT
Start: 2018-01-19 | End: 2018-01-19

## 2018-01-19 RX ORDER — OXYCODONE AND ACETAMINOPHEN 10; 325 MG/1; MG/1
1 TABLET ORAL EVERY 4 HOURS PRN
Status: DISCONTINUED | OUTPATIENT
Start: 2018-01-19 | End: 2018-01-19 | Stop reason: HOSPADM

## 2018-01-19 RX ORDER — VANCOMYCIN HYDROCHLORIDE 500 MG/10ML
INJECTION, POWDER, LYOPHILIZED, FOR SOLUTION INTRAVENOUS
Status: DISCONTINUED | OUTPATIENT
Start: 2018-01-19 | End: 2018-01-19 | Stop reason: HOSPADM

## 2018-01-19 RX ORDER — DIPHENHYDRAMINE HYDROCHLORIDE 50 MG/ML
25 INJECTION INTRAMUSCULAR; INTRAVENOUS EVERY 6 HOURS PRN
Status: DISCONTINUED | OUTPATIENT
Start: 2018-01-19 | End: 2018-01-19 | Stop reason: HOSPADM

## 2018-01-19 RX ORDER — CEFAZOLIN SODIUM 1 G/3ML
INJECTION, POWDER, FOR SOLUTION INTRAMUSCULAR; INTRAVENOUS
Status: DISCONTINUED | OUTPATIENT
Start: 2018-01-19 | End: 2018-01-19

## 2018-01-19 RX ORDER — METHOCARBAMOL 500 MG/1
500 TABLET, FILM COATED ORAL 4 TIMES DAILY PRN
Qty: 60 TABLET | Refills: 0 | Status: SHIPPED | OUTPATIENT
Start: 2018-01-19 | End: 2018-02-05 | Stop reason: SDUPTHER

## 2018-01-19 RX ORDER — SODIUM CHLORIDE 0.9 % (FLUSH) 0.9 %
3 SYRINGE (ML) INJECTION
Status: DISCONTINUED | OUTPATIENT
Start: 2018-01-19 | End: 2018-01-19 | Stop reason: HOSPADM

## 2018-01-19 RX ORDER — ONDANSETRON 2 MG/ML
4 INJECTION INTRAMUSCULAR; INTRAVENOUS ONCE AS NEEDED
Status: DISCONTINUED | OUTPATIENT
Start: 2018-01-19 | End: 2018-01-19 | Stop reason: HOSPADM

## 2018-01-19 RX ORDER — FENTANYL CITRATE 50 UG/ML
25 INJECTION, SOLUTION INTRAMUSCULAR; INTRAVENOUS
Status: DISCONTINUED | OUTPATIENT
Start: 2018-01-19 | End: 2018-01-19 | Stop reason: HOSPADM

## 2018-01-19 RX ORDER — OXYCODONE AND ACETAMINOPHEN 10; 325 MG/1; MG/1
1 TABLET ORAL EVERY 6 HOURS PRN
Qty: 60 TABLET | Refills: 0 | Status: SHIPPED | OUTPATIENT
Start: 2018-01-19 | End: 2018-02-05 | Stop reason: SDUPTHER

## 2018-01-19 RX ORDER — MUPIROCIN 20 MG/G
1 OINTMENT TOPICAL
Status: DISCONTINUED | OUTPATIENT
Start: 2018-01-19 | End: 2018-01-19

## 2018-01-19 RX ORDER — HYDRALAZINE HYDROCHLORIDE 20 MG/ML
20 INJECTION INTRAMUSCULAR; INTRAVENOUS EVERY 6 HOURS PRN
Status: DISCONTINUED | OUTPATIENT
Start: 2018-01-19 | End: 2018-01-19 | Stop reason: HOSPADM

## 2018-01-19 RX ORDER — DIAZEPAM 5 MG/1
5 TABLET ORAL EVERY 6 HOURS PRN
Status: DISCONTINUED | OUTPATIENT
Start: 2018-01-19 | End: 2018-01-19 | Stop reason: HOSPADM

## 2018-01-19 RX ORDER — ROCURONIUM BROMIDE 10 MG/ML
INJECTION, SOLUTION INTRAVENOUS
Status: DISCONTINUED | OUTPATIENT
Start: 2018-01-19 | End: 2018-01-19

## 2018-01-19 RX ORDER — SODIUM CHLORIDE 9 MG/ML
INJECTION, SOLUTION INTRAVENOUS CONTINUOUS PRN
Status: DISCONTINUED | OUTPATIENT
Start: 2018-01-19 | End: 2018-01-19

## 2018-01-19 RX ORDER — FENTANYL CITRATE 50 UG/ML
INJECTION, SOLUTION INTRAMUSCULAR; INTRAVENOUS
Status: DISCONTINUED | OUTPATIENT
Start: 2018-01-19 | End: 2018-01-19

## 2018-01-19 RX ORDER — ONDANSETRON 2 MG/ML
INJECTION INTRAMUSCULAR; INTRAVENOUS
Status: DISCONTINUED | OUTPATIENT
Start: 2018-01-19 | End: 2018-01-19

## 2018-01-19 RX ORDER — LIDOCAINE HCL/PF 100 MG/5ML
SYRINGE (ML) INTRAVENOUS
Status: DISCONTINUED | OUTPATIENT
Start: 2018-01-19 | End: 2018-01-19

## 2018-01-19 RX ORDER — ACETAMINOPHEN 10 MG/ML
INJECTION, SOLUTION INTRAVENOUS
Status: DISCONTINUED | OUTPATIENT
Start: 2018-01-19 | End: 2018-01-19

## 2018-01-19 RX ORDER — GABAPENTIN 300 MG/1
600 CAPSULE ORAL 3 TIMES DAILY
Status: DISCONTINUED | OUTPATIENT
Start: 2018-01-19 | End: 2018-01-19 | Stop reason: HOSPADM

## 2018-01-19 RX ORDER — KETAMINE HYDROCHLORIDE 100 MG/ML
INJECTION, SOLUTION INTRAMUSCULAR; INTRAVENOUS
Status: DISCONTINUED | OUTPATIENT
Start: 2018-01-19 | End: 2018-01-19

## 2018-01-19 RX ORDER — HEPARIN SODIUM 5000 [USP'U]/ML
5000 INJECTION, SOLUTION INTRAVENOUS; SUBCUTANEOUS EVERY 8 HOURS
Status: DISCONTINUED | OUTPATIENT
Start: 2018-01-20 | End: 2018-01-19

## 2018-01-19 RX ORDER — CEFAZOLIN SODIUM 1 G/3ML
2 INJECTION, POWDER, FOR SOLUTION INTRAMUSCULAR; INTRAVENOUS
Status: DISCONTINUED | OUTPATIENT
Start: 2018-01-19 | End: 2018-01-19

## 2018-01-19 RX ORDER — PHENYLEPHRINE HYDROCHLORIDE 10 MG/ML
INJECTION INTRAVENOUS
Status: DISCONTINUED | OUTPATIENT
Start: 2018-01-19 | End: 2018-01-19

## 2018-01-19 RX ADMIN — OXYCODONE HYDROCHLORIDE 10 MG: 5 TABLET ORAL at 04:01

## 2018-01-19 RX ADMIN — PHENYLEPHRINE HYDROCHLORIDE 100 MCG: 10 INJECTION INTRAVENOUS at 12:01

## 2018-01-19 RX ADMIN — ROCURONIUM BROMIDE 10 MG: 10 INJECTION, SOLUTION INTRAVENOUS at 12:01

## 2018-01-19 RX ADMIN — PROPOFOL 180 MG: 10 INJECTION, EMULSION INTRAVENOUS at 11:01

## 2018-01-19 RX ADMIN — ACETAMINOPHEN 1000 MG: 10 INJECTION, SOLUTION INTRAVENOUS at 12:01

## 2018-01-19 RX ADMIN — DIAZEPAM 5 MG: 5 TABLET ORAL at 04:01

## 2018-01-19 RX ADMIN — ROCURONIUM BROMIDE 40 MG: 10 INJECTION, SOLUTION INTRAVENOUS at 11:01

## 2018-01-19 RX ADMIN — FENTANYL CITRATE 25 MCG: 50 INJECTION, SOLUTION INTRAMUSCULAR; INTRAVENOUS at 02:01

## 2018-01-19 RX ADMIN — PHENYLEPHRINE HYDROCHLORIDE 200 MCG: 10 INJECTION INTRAVENOUS at 12:01

## 2018-01-19 RX ADMIN — SODIUM CHLORIDE: 0.9 INJECTION, SOLUTION INTRAVENOUS at 12:01

## 2018-01-19 RX ADMIN — FENTANYL CITRATE 100 MCG: 50 INJECTION, SOLUTION INTRAMUSCULAR; INTRAVENOUS at 11:01

## 2018-01-19 RX ADMIN — PROPOFOL 20 MG: 10 INJECTION, EMULSION INTRAVENOUS at 12:01

## 2018-01-19 RX ADMIN — SODIUM CHLORIDE: 0.9 INJECTION, SOLUTION INTRAVENOUS at 11:01

## 2018-01-19 RX ADMIN — PHENYLEPHRINE HYDROCHLORIDE 200 MCG: 10 INJECTION INTRAVENOUS at 01:01

## 2018-01-19 RX ADMIN — KETAMINE HYDROCHLORIDE 60 MG: 100 INJECTION, SOLUTION, CONCENTRATE INTRAMUSCULAR; INTRAVENOUS at 12:01

## 2018-01-19 RX ADMIN — FENTANYL CITRATE 50 MCG: 50 INJECTION, SOLUTION INTRAMUSCULAR; INTRAVENOUS at 12:01

## 2018-01-19 RX ADMIN — GLYCOPYRROLATE 0.6 MG: 0.2 INJECTION, SOLUTION INTRAMUSCULAR; INTRAVENOUS at 01:01

## 2018-01-19 RX ADMIN — PROPOFOL 40 MG: 10 INJECTION, EMULSION INTRAVENOUS at 01:01

## 2018-01-19 RX ADMIN — LIDOCAINE HYDROCHLORIDE 100 MG: 20 INJECTION, SOLUTION INTRAVENOUS at 11:01

## 2018-01-19 RX ADMIN — ONDANSETRON 4 MG: 2 INJECTION INTRAMUSCULAR; INTRAVENOUS at 12:01

## 2018-01-19 RX ADMIN — OXYCODONE HYDROCHLORIDE AND ACETAMINOPHEN 1 TABLET: 10; 325 TABLET ORAL at 02:01

## 2018-01-19 RX ADMIN — ROCURONIUM BROMIDE 20 MG: 10 INJECTION, SOLUTION INTRAVENOUS at 12:01

## 2018-01-19 RX ADMIN — CEFAZOLIN 2 G: 330 INJECTION, POWDER, FOR SOLUTION INTRAMUSCULAR; INTRAVENOUS at 12:01

## 2018-01-19 RX ADMIN — NEOSTIGMINE METHYLSULFATE 5 MG: 1 INJECTION INTRAVENOUS at 01:01

## 2018-01-19 RX ADMIN — MIDAZOLAM 2 MG: 5 INJECTION INTRAMUSCULAR; INTRAVENOUS at 11:01

## 2018-01-19 RX ADMIN — GABAPENTIN 600 MG: 300 CAPSULE ORAL at 02:01

## 2018-01-19 NOTE — PLAN OF CARE
Patient and spouse state they are ready to be discharged. Instructions and prescriptions given to patient and family. Both verbalize understanding. Patient tolerating po liquids with no difficulty. Patient states pain is at a tolerable level for them. Anesthesia consent and surgical consent in chart upon patient's discharge from [facility].

## 2018-01-19 NOTE — PROGRESS NOTES
Certification of Assistant at Surgery       Surgery Date: 1/19/2018     Participating Surgeons:  Surgeon(s) and Role:     * Adelso Hernandez MD - Primary    Procedures:  Procedure(s) (LRB):  MICRODISCECTOMY Right L5-S1 Microdiscectomy (Right)    Assistant Surgeon's Certification of Necessity:  I understand that section 1842 (b) (6) (d) of the Social Security Act generally prohibits Medicare Part B reasonable charge payment for the services of assistants at surgery in teaching hospitals when qualified residents are available to furnish such services. I certify that the services for which payment is claimed were medically necessary, and that no qualified resident was available to perform the services. I further understand that these services are subject to post-payment review by the Medicare carrier.      HENRY Mills    01/19/2018  1:37 PM

## 2018-01-19 NOTE — DISCHARGE INSTRUCTIONS
Please follow ONLY the instructions that are checked below.    Activity Restrictions:  [x]  Return to work will be determined on an individual basis.   [x]  No sitting in a chair longer than 15-20 minutes at a time.  [x]  No lifting greater than 10 pounds.  [x]  Avoid bending and twisting the area of your surgery more than 45 degrees from neutral position in any direction.  [x]  No driving or operating machinery:  [x]  until cleared by your surgeon.  [x]  while taking narcotic pain medications or muscle relaxants.  [x]  No cervical collar, soft collar, or lumbar brace required.  [x]  Increase ambulation over the next 2 weeks so that you are walking 2 miles per day at 2 weeks post-operatively.  [x]  Walk on paved surfaces only. It is okay to walk up and down stairs while holding onto a side rail.  [x]  No sexual activity for 2-3 weeks.    Discharge Medication/Follow-up:  [x]  Please refer to discharge medication reconciliation form.  [x]  Do not take ANY Aspirin or Aspirin containing products for 2 weeks after surgery.   [x]  Prescriptions for appropriate medication will be given upon discharge.  [x]  Take docusate (Colace 100 mg): take one capsule a day as needed for constipation. You can get this over the counter.  [x]  Follow-up appointment:  [x]  10-14 days post-op for wound check by physician assistant/nurse  [x]  4-6 weeks with MD:  [x]  without x-rays  [x]  An appointment will be mailed to you.    Wound Care:  [x]  Remove dressing tomorrow evening then leave incision open to air.   [x]  You may shower on the 2nd day after your surgery. Have the force of water hit you opposite from the incision. Pat the incision dry after your shower; do not scrub the incision.  [x]  You cannot take a bath until 8 weeks after surgery.      Call your doctor or go to the Emergency Room for any signs of infection, including: increased redness, drainage, pain, or fever (temperature ?101.5 for 24 hours). Call your doctor or go to  the Emergency Room if there are any localized neurological changes; problems with speech, vision, numbness, tingling, weakness, or severe headache; or for other concerns.    Special Instructions:  [x]  No use of tobacco products.  [x]  Diet: Please eat a regular diet as tolerated.    Physicians need 3 days' notice for pain medicine to be refilled. Pain medicine will only be refilled between 8 AM and 5 PM, Monday through Friday, due to Food and Drug Administration regulation of documentation.    If you have any questions about this form, please call [phone].    Form No. 64263 (Revised 10/31/2013)      Recovery After Procedural Sedation (Adult)  You have been given medicine by vein to make you sleep during your surgery. This may have included both a pain medicine and sleeping medicine. Most of the effects have worn off. But you may still have some drowsiness for the next 6 to 8 hours.  Home care  Follow these guidelines when you get home:  · For the next 8 hours, you should be watched by a responsible adult. This person should make sure your condition is not getting worse.  · Don't drink any alcohol for the next 24 hours.  · Don't drive, operate dangerous machinery, or make important business or personal decisions during the next 24 hours.  Note: Your healthcare provider may tell you not to take any medicine by mouth for pain or sleep in the next 4 hours. These medicines may react with the medicines you were given in the hospital. This could cause a much stronger response than usual.  Follow-up care  Follow up with your healthcare provider if you are not alert and back to your usual level of activity within 12 hours.  When to seek medical advice  Call your healthcare provider right away if any of these occur:  · Drowsiness gets worse  · Weakness or dizziness gets worse  · Repeated vomiting  · You can't be awakened   Date Last Reviewed: 10/18/2016  © 4703-0658 [employer]. [address] [facility],  [place] [address]. All rights reserved. This information is not intended as a substitute for professional medical care. Always follow your healthcare professional's instructions.

## 2018-01-19 NOTE — TRANSFER OF CARE
"Anesthesia Transfer of Care Note    Patient: Saul Hancock    Procedure(s) Performed: Procedure(s) (LRB):  MICRODISCECTOMY Right L5-S1 Microdiscectomy (Right)    Patient location: PACU    Anesthesia Type: general    Transport from OR: Transported from OR on 6-10 L/min O2 by face mask with adequate spontaneous ventilation    Post pain: adequate analgesia    Post assessment: no apparent anesthetic complications    Post vital signs: stable    Level of consciousness: awake    Nausea/Vomiting: no nausea/vomiting    Complications: none    Transfer of care protocol was followed      Last vitals:   Visit Vitals  BP (!) 160/91 (BP Location: Right arm, Patient Position: Lying)   Pulse 81   Temp 36.6 °C (97.9 °F) (Oral)   Resp 18   Ht 6' 3" (1.905 m)   Wt 116.6 kg (257 lb)   SpO2 100%   BMI 32.12 kg/m²     "

## 2018-01-19 NOTE — INTERVAL H&P NOTE
The patient has been examined and the H&P has been reviewed:    I concur with the findings and no changes have occurred since H&P was written.    Anesthesia/Surgery risks, benefits and alternative options discussed and understood by patient/family.      TO OR FOR SURGERY AS PLANNED>    Active Hospital Problems    Diagnosis  POA    Radiculopathy [M54.10]  Yes      Resolved Hospital Problems    Diagnosis Date Resolved POA   No resolved problems to display.

## 2018-01-20 NOTE — OP NOTE
DATE OF PROCEDURE: 01/19/2018     PREOPERATIVE DIAGNOSES:  1. Right L5-S1 disc herniation with radiculopathy     POSTOPERATIVE DIAGNOSES:  1. Right L5-S1 disc herniation with radiculopathy     PROCEDURES PERFORMED:     1. Right L5-S1 minimally invasive approach to the lumbar spine  2. Right L5-S1 laminotomy and microdiscectomy  3. Microscope assistance.  4. Fluoroscopic localization.     PRIMARY SURGEON: Adelso Hernandez M.D.     ASSISTANT SURGEON: Ruben Arevalo MD (RES), [place] was the first assistant for the microdiscectomy portion of the case when Ruben Arevalo became unavailable     INDICATION: This is a 52-year-old male who has right S1 distribution leg pain for several months. The patient had an MRI showing a right L5-S1 disc herniation compressing the right S1 nerve root. Risks, benefits, alternatives and limitations were discussed with the patient. The risks   included nerve root injury, spinal cord injury that can cause paralysis,   hardware failure and subsidence that could require re-operation as well as   hardware and screw malpositioning and vertebral artery injury that can cause a   stroke. The patient wanted to proceed and the consent was obtained.      DESCRIPTION OF THE PROCEDURE: The patient was intubated under general   anesthesia and positioned prone on the 4 posts Carlos table.  All pressure points were carefully padded. An 18 mm incision was planned 15   mm right to the midline at L5-S1 using fluoroscopy. The patient was prepped and draped   in the typical sterile fashion and the incision was made with a #15 blade.   Hemostasis was complete and the thoracolumbar fascia was garces with a k-wire.   We then inserted serial dilators and a final 18 mm x 8 cm tubular retractor was   docked in the junction of the inferior lamina and the base of the spinous process. Using the microscope, the multifidus muscle was dissected subperiosteally with the monopolar to expose  the  base of the spinous process and the ipsilateral lamina.   We then used the high-speed bur to drill the base of the spinous process, the   Ipsilateral lamina and one-fourth of the ipsilateral superior and inferior medial facet. After drilling, we could identify the yellow ligament ipsilaterally. The yellow ligament was   then dissected and removed with Kerrisons. Decompression of the dura was   carried out by removing the yellow ligament. We retracted the traversing nerve root and we then identified the disc herniation.  The disc annulus was divided and the microdiscectomy was completed. After the decompression, the S1 nerve root was inspected and was fully decompressed. We then completed the hemostasis in the epidural space with gelfoam powder soaked in thrombin. After copious irrigation we put some vancomycin powder in the epidural space and removed the retractor under  direct vision. Hemostasis on the muscle layers was done with the bipolar and   vancomycin was added and into the muscle layer. The thoracolumbar fascia was   closed with 0 Vicryl and the subcutaneous layer was closed with inverted 2-0   Vicryl and the skin was closed with a running 4-0 Monocryl. The wound was   dressed with Steri-Strips and the patient was transferred to the Recovery Room   under the care of the anesthesiologist. The blood loss was about 20 mL. The   final count was completed and nothing was missing. There was no complication.

## 2018-01-21 NOTE — ANESTHESIA POSTPROCEDURE EVALUATION
"Anesthesia Post Evaluation    Patient: Saul Hancock    Procedure(s) Performed: Procedure(s) (LRB):  MICRODISCECTOMY Right L5-S1 Microdiscectomy (Right)    Final Anesthesia Type: general  Patient location during evaluation: PACU  Patient participation: Yes- Able to Participate  Level of consciousness: awake and alert and oriented  Post-procedure vital signs: reviewed and stable  Pain management: adequate  Airway patency: patent  PONV status at discharge: No PONV  Anesthetic complications: no      Cardiovascular status: hemodynamically stable  Respiratory status: unassisted, spontaneous ventilation and room air  Hydration status: euvolemic  Follow-up not needed.        Visit Vitals  BP (!) 162/85   Pulse 88   Temp 36.8 °C (98.3 °F) (Skin)   Resp 18   Ht 6' 3" (1.905 m)   Wt 116.6 kg (257 lb)   SpO2 100%   BMI 32.12 kg/m²       Pain/Aldo Score: Pain Assessment Performed: Yes (1/19/2018  5:45 PM)  Presence of Pain: denies (1/19/2018  5:45 PM)  Pain Rating Prior to Med Admin: 10 (1/19/2018  4:45 PM)  Aldo Score: 10 (1/19/2018  3:40 PM)      "

## 2018-01-22 NOTE — DISCHARGE SUMMARY
[facility]  Neurosurgery  Discharge Summary      Patient Name: Saul Hancock  MRN: [id number]  Admission Date: 1/19/2018  Hospital Length of Stay: 0 days  Discharge Date and Time: 1/19/2018  6:15 PM  Attending Physician: Lyudmila att. providers found   Discharging Provider: [place]  Primary Care Provider: Kenny Dasilva Jr, MD       HPI:   Mr. Saul Hancock is a 52-year-old male who has right S1 distribution leg pain for several months. The patient had an MRI showing a right L5-S1 disc herniation compressing the right S1 nerve root. Risks, benefits, alternatives and limitations were discussed with the patient. The risks included nerve root injury, spinal cord injury that can cause paralysis, hardware failure and subsidence that could require re-operation as well as   hardware and screw malpositioning and vertebral artery injury that can cause a stroke. The patient wanted to proceed and the consent was obtained.       DATE OF PROCEDURE: 01/19/2018     PREOPERATIVE DIAGNOSES:  1. Right L5-S1 disc herniation with radiculopathy     POSTOPERATIVE DIAGNOSES:  1. Right L5-S1 disc herniation with radiculopathy     PROCEDURES PERFORMED:  1. Right L5-S1 minimally invasive approach to the lumbar spine  2. Right L5-S1 laminotomy and microdiscectomy  3. Microscope assistance.  4. Fluoroscopic localization.     PRIMARY SURGEON: Adelso Hernandez M.D.     ASSISTANT SURGEON: Ruben Arevalo MD (RES), [place]      Hospital Course:   [place]. H[place] [facility] [place] for the above stated procedure. He tolerated the procedure well and there were no intra-operative complications. No post operative drains or imaging. He recovered in PACU, where his pain was controlled, he ambulated, and voided independently. On 1/19/18, he was discharged home with pain medication and follow up appointments. Regular diet. Activity as tolerated. At the time of discharge, vital signs were stable, patient  was afebrile and neuro stable. Discharge instructions were given verbally/written to the patient and all of his questions were answered. Patient voiced understanding. He was encouraged to call the clinic with any questions he may have prior to the follow up appointments.       Consults: None    Significant Diagnostic Studies: Labs: BMP: No results for input(s): GLU, NA, K, CL, CO2, BUN, CREATININE, CALCIUM, MG in the last 48 hours. and CBC No results for input(s): WBC, HGB, HCT, PLT in the last 48 hours.    Pending Diagnostic Studies:     None        Final Active Diagnoses:    Diagnosis Date Noted POA    PRINCIPAL PROBLEM:  Radiculopathy [M54.10] 01/19/2018 Yes      Problems Resolved During this Admission:    Diagnosis Date Noted Date Resolved POA      Discharged Condition: good    Disposition: Home or Self Care    Follow Up: 2 weeks for wound check and 6 weeks with Dr. Hernandez, no imaging.     Patient Instructions:   See the patient instruction tab for detailed discharge instructions and follow up information.       Activity as tolerated     Notify your health care provider if you experience any of the following:  temperature >100.4     Notify your health care provider if you experience any of the following:  persistent nausea and vomiting or diarrhea     Notify your health care provider if you experience any of the following:  severe uncontrolled pain     Notify your health care provider if you experience any of the following:  redness, tenderness, or signs of infection (pain, swelling, redness, odor or green/yellow discharge around incision site)     Notify your health care provider if you experience any of the following:  difficulty breathing or increased cough     Notify your health care provider if you experience any of the following:  severe persistent headache     Notify your health care provider if you experience any of the following:  worsening rash     Notify your health care provider if you experience any  of the following:  persistent dizziness, light-headedness, or visual disturbances     Notify your health care provider if you experience any of the following:  increased confusion or weakness     Remove dressing in 24 hours       Medications:  Reconciled Home Medications:   Discharge Medication List as of 1/19/2018  3:56 PM      CONTINUE these medications which have CHANGED    Details   methocarbamol (ROBAXIN) 500 MG Tab Take 1 tablet (500 mg total) by mouth 4 (four) times daily as needed., Starting Fri 1/19/2018, Print      oxyCODONE-acetaminophen (PERCOCET)  mg per tablet Take 1 tablet by mouth every 6 (six) hours as needed for Pain., Starting Fri 1/19/2018, Print         CONTINUE these medications which have NOT CHANGED    Details   gabapentin (NEURONTIN) 300 MG capsule Take 2 capsules (600 mg total) by mouth 3 (three) times daily., Starting Wed 1/3/2018, Until Fri 2/2/2018, Normal      lurasidone (LATUDA) 40 mg Tab tablet Take 40 mg by mouth nightly., Historical Med      oxaprozin (DAYPRO) 600 mg tablet Take 1 tablet (600 mg total) by mouth once daily., Starting Sun 10/15/2017, Print         STOP taking these medications       naproxen (NAPROSYN) 500 MG tablet Comments:   Reason for Stopping:         oxycodone-acetaminophen (PERCOCET) 5-325 mg per tablet Comments:   Reason for Stopping:               [place]  Neurosurgery  [facility]

## 2018-01-26 ENCOUNTER — PATIENT MESSAGE (OUTPATIENT)
Dept: NEUROSURGERY | Facility: CLINIC | Age: 53
End: 2018-01-26

## 2018-02-05 ENCOUNTER — OFFICE VISIT (OUTPATIENT)
Dept: NEUROSURGERY | Facility: CLINIC | Age: 53
End: 2018-02-05
Payer: COMMERCIAL

## 2018-02-05 VITALS
BODY MASS INDEX: 30.31 KG/M2 | HEART RATE: 96 BPM | DIASTOLIC BLOOD PRESSURE: 89 MMHG | SYSTOLIC BLOOD PRESSURE: 131 MMHG | WEIGHT: 242.5 LBS

## 2018-02-05 DIAGNOSIS — Z98.890 S/P LUMBAR DISCECTOMY: Primary | ICD-10-CM

## 2018-02-05 DIAGNOSIS — M54.16 LUMBAR RADICULOPATHY: ICD-10-CM

## 2018-02-05 PROCEDURE — 99024 POSTOP FOLLOW-UP VISIT: CPT | Mod: S$GLB,,, | Performed by: PHYSICIAN ASSISTANT

## 2018-02-05 PROCEDURE — 99999 PR PBB SHADOW E&M-EST. PATIENT-LVL III: CPT | Mod: PBBFAC,,, | Performed by: PHYSICIAN ASSISTANT

## 2018-02-05 RX ORDER — OXYCODONE AND ACETAMINOPHEN 10; 325 MG/1; MG/1
1 TABLET ORAL EVERY 6 HOURS PRN
Qty: 60 TABLET | Refills: 0 | Status: SHIPPED | OUTPATIENT
Start: 2018-02-05 | End: 2018-03-12 | Stop reason: DRUGHIGH

## 2018-02-05 RX ORDER — METHOCARBAMOL 500 MG/1
500 TABLET, FILM COATED ORAL 4 TIMES DAILY PRN
Qty: 60 TABLET | Refills: 0 | Status: SHIPPED | OUTPATIENT
Start: 2018-02-05 | End: 2019-06-14

## 2018-02-05 NOTE — PROGRESS NOTES
"[facility] - Neurosurgery  Progress Note      SUBJECTIVE:     Chief Complaint/Reason for Visit: 2 week post op follow up     History of Present Illness:  Saul Hancock is a 52 y.o. male who is 2 weeks status post Right L5-S1 laminotomy and microdiscectomy for treatment of right S1 distribution leg pain. Patient is doing well since discharge. Reports improvement in leg pain. Overall, satisfied with the outcome of his surgery. He has been compliant with his activity restrictions. He is interested in returning to work on light duty.     Low Back Pain Scale  R Low Back-Pain Score: 2  R Low Back-Pain Intensity: Pain killers give moderate relief from pain  R Low Back-Pain Score: I need some help, but manage most of my personal care  Low Back-Lifting:  (pt did not answer)   Low Back-Walking: Pain does not prevent me from walking any distance   Low Back-Sitting:  (pt didn't answer says he hasnt been doing anything but layin)   Low Back-Standing:  (pt didn't answer says he hasnt been doing anything but layin)   Low Back-Sleeping: I have pain in bed but it does not prevent me from sleeping well   Low Back-Social Life:  (pt didn't answer says he hasnt been doing anything but layin)   Low Back-Traveling:  (pt didn't answer says he hasnt been doing anything but layin)   Low Back-Changing Degree of Pain: My pain fluctuates but is definitely getting better         Review of patient's allergies indicates:   Allergen Reactions    Corticosteroids (glucocorticoids)      "Psychosis"    Cashew nut Rash         OBJECTIVE:     Vital Signs (Most Recent):  Pulse: 96 (02/05/18 1115)  BP: 131/89 (02/05/18 1115)    Physical Exam:  General: well developed, well nourished, no distress  Neurologic: Alert and oriented. Thought content appropriate.   GCS: Motor: 6/Verbal: 5/Eyes: 4 GCS Total: 15   Mental Status: Awake, Alert, Oriented x3   Cranial nerves: face symmetric, tongue midline, pupils equal, round, reactive to light, EOMI.   Motor " Strength: moves all extremities with good strength and tone   Sensation: response to light touch throughout  No gait disturbances   Incision is clean, dry and intact with no signs of erythema, swelling or purulent drainage. Dissolvable sutures are intact. All skin edges are completely approximated.       Diagnostic Results:  N/A    ASSESSMENT/PLAN:     52 y.o. male 2 weeks s/p right L5-S1 laminotomy and microdiscectomy       - Refill robaxin and percocet  - Follow up with Dr. Hernandez in 4 weeks  - Discussed activity restrictions  - May return to work with restrictions   - Keep incision open to air.  - Can shower and get incision wet, just pat dry and no vigorous scrubbing. Do not submerge incision for another 6 weeks.   - No lifting more than 10 lbs or excessive bending/twisting.   - Encouraged patient to call if they have any questions or concerns prior to next follow up appt.      Consuelo Xiao PA-C  Neurosurgery

## 2018-03-05 ENCOUNTER — OFFICE VISIT (OUTPATIENT)
Dept: NEUROSURGERY | Facility: CLINIC | Age: 53
End: 2018-03-05
Payer: COMMERCIAL

## 2018-03-05 VITALS
SYSTOLIC BLOOD PRESSURE: 145 MMHG | HEART RATE: 72 BPM | DIASTOLIC BLOOD PRESSURE: 93 MMHG | WEIGHT: 238.75 LBS | BODY MASS INDEX: 29.84 KG/M2

## 2018-03-05 DIAGNOSIS — Z98.890 S/P LUMBAR MICRODISCECTOMY: Primary | ICD-10-CM

## 2018-03-05 PROCEDURE — 99024 POSTOP FOLLOW-UP VISIT: CPT | Mod: S$GLB,,, | Performed by: NEUROLOGICAL SURGERY

## 2018-03-05 PROCEDURE — 99999 PR PBB SHADOW E&M-EST. PATIENT-LVL III: CPT | Mod: PBBFAC,,, | Performed by: NEUROLOGICAL SURGERY

## 2018-03-12 RX ORDER — OXYCODONE AND ACETAMINOPHEN 5; 325 MG/1; MG/1
1 TABLET ORAL EVERY 6 HOURS PRN
Qty: 40 TABLET | Refills: 0 | Status: SHIPPED | OUTPATIENT
Start: 2018-03-12 | End: 2019-06-25

## 2018-03-13 ENCOUNTER — CLINICAL SUPPORT (OUTPATIENT)
Dept: REHABILITATION | Facility: HOSPITAL | Age: 53
End: 2018-03-13
Attending: NEUROLOGICAL SURGERY
Payer: COMMERCIAL

## 2018-03-13 ENCOUNTER — PATIENT MESSAGE (OUTPATIENT)
Dept: NEUROSURGERY | Facility: CLINIC | Age: 53
End: 2018-03-13

## 2018-03-13 DIAGNOSIS — M53.80 BACK TIGHTNESS: ICD-10-CM

## 2018-03-13 DIAGNOSIS — G89.4 CHRONIC PAIN DISORDER: ICD-10-CM

## 2018-03-13 DIAGNOSIS — M54.16 LUMBAR RADICULOPATHY: ICD-10-CM

## 2018-03-13 DIAGNOSIS — M51.36 DDD (DEGENERATIVE DISC DISEASE), LUMBAR: ICD-10-CM

## 2018-03-13 DIAGNOSIS — Z74.09 IMPAIRED MOBILITY AND ACTIVITIES OF DAILY LIVING: ICD-10-CM

## 2018-03-13 DIAGNOSIS — Z98.890 S/P LUMBAR MICRODISCECTOMY: Primary | ICD-10-CM

## 2018-03-13 DIAGNOSIS — M99.03 SEGMENTAL AND SOMATIC DYSFUNCTION OF LUMBAR REGION: ICD-10-CM

## 2018-03-13 DIAGNOSIS — M47.816 LUMBAR SPONDYLOSIS: ICD-10-CM

## 2018-03-13 DIAGNOSIS — M62.81 WEAKNESS OF TRUNK MUSCULATURE: ICD-10-CM

## 2018-03-13 DIAGNOSIS — M51.16 LUMBAR DISC HERNIATION WITH RADICULOPATHY: ICD-10-CM

## 2018-03-13 DIAGNOSIS — Z78.9 IMPAIRED MOBILITY AND ACTIVITIES OF DAILY LIVING: ICD-10-CM

## 2018-03-13 DIAGNOSIS — R29.898 WEAKNESS OF BOTH HIPS: ICD-10-CM

## 2018-03-13 PROCEDURE — 97161 PT EVAL LOW COMPLEX 20 MIN: CPT | Mod: PN | Performed by: PHYSICAL THERAPIST

## 2018-03-13 RX ORDER — GABAPENTIN 300 MG/1
600 CAPSULE ORAL 3 TIMES DAILY
Qty: 180 CAPSULE | Refills: 2 | Status: SHIPPED | OUTPATIENT
Start: 2018-03-13 | End: 2018-09-17 | Stop reason: ALTCHOICE
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
## 2018-03-13 NOTE — TELEPHONE ENCOUNTER
----- Message from Zandra Sanchez MA sent at 3/12/2018  4:40 PM CDT -----  Medication Renewal Request     Saul Hancock   Patient Medication Renewal Request Pool 20 hours ago (8:06 PM)       Saul Hancock would like a refill of the following medications:         gabapentin (NEURONTIN) 300 MG capsule [Jerica Vasquez, NP]       Patient Comment: Out of Medicine     Preferred pharmacy: [facility] [address] [place] [address] [address]         Medication renewals requested in this message routed separately:         oxyCODONE-acetaminophen (PERCOCET)  mg per tablet [Consuelo Xiao PA-C]       Patient Comment: Start Physical Therapy on 4/13/18, have been experiencing pain which has been interrupting sleep.  Pain levels vary. Out of Medicine
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
## 2018-03-20 ENCOUNTER — CLINICAL SUPPORT (OUTPATIENT)
Dept: REHABILITATION | Facility: HOSPITAL | Age: 53
End: 2018-03-20
Attending: NEUROLOGICAL SURGERY
Payer: COMMERCIAL

## 2018-03-23 ENCOUNTER — CLINICAL SUPPORT (OUTPATIENT)
Dept: REHABILITATION | Facility: HOSPITAL | Age: 53
End: 2018-03-23
Attending: NEUROLOGICAL SURGERY
Payer: COMMERCIAL

## 2018-03-23 DIAGNOSIS — M99.03 SEGMENTAL AND SOMATIC DYSFUNCTION OF LUMBAR REGION: ICD-10-CM

## 2018-03-23 DIAGNOSIS — M53.80 BACK TIGHTNESS: ICD-10-CM

## 2018-03-23 DIAGNOSIS — Z98.890 S/P LUMBAR MICRODISCECTOMY: Primary | ICD-10-CM

## 2018-03-23 DIAGNOSIS — R29.898 WEAKNESS OF BOTH HIPS: ICD-10-CM

## 2018-03-23 DIAGNOSIS — Z74.09 IMPAIRED MOBILITY AND ACTIVITIES OF DAILY LIVING: ICD-10-CM

## 2018-03-23 DIAGNOSIS — Z78.9 IMPAIRED MOBILITY AND ACTIVITIES OF DAILY LIVING: ICD-10-CM

## 2018-03-23 DIAGNOSIS — M62.81 WEAKNESS OF TRUNK MUSCULATURE: ICD-10-CM

## 2018-03-23 PROCEDURE — 97110 THERAPEUTIC EXERCISES: CPT | Mod: PN

## 2018-03-23 NOTE — PROGRESS NOTES
Physical Therapy Daily Note     Name: Saul Hancock  Clinic Number: [id number]  Diagnosis:   Encounter Diagnoses   Name Primary?    S/P lumbar microdiscectomy Yes    Back tightness     Weakness of trunk musculature     Weakness of both hips     Segmental and somatic dysfunction of lumbar region     Impaired mobility and activities of daily living      Physician: Adelso Hernandez MD  Precautions: S/P lumbar microdiscectomy (1/19/18)  Visit #: 2 of 20  PTA Visit #: 1    Time In: 1300  Time Out: 1400  Total Treatment Time: 60 minutes (1:1 with PTA duration of treatment session)    Subjective     Pt reports: Saul states his back is feeling much better since surgery. Patient states he is a little sore in his lower back today but it's nothing to complain about.   Pain Scale: Saul rates pain on a scale of 0-10 to be 2 currently.    Objective     Saul received individual therapeutic exercises to develop strength, endurance, ROM, flexibility, posture and core stabilization for 45 minutes including:    Upright bike x 7 minutes, level 5     LTR with physioball x 2 minutes  DKTC with physioball x 2 minutes  Supine piriformis stretch 20 sec x 3 ea   Supine hip flexor stretch x 1 minute x 2 ea   Posterior pelvic tilt x 2 minutes  PPT into bridge 1x10  TrA Activation x 2 minutes  TrA with mini marches x 2 minutes     Hip adduction ball squeeze x 2 minutes  SL clamshells (RTB) 2x10  Seated hamstring stretch with ball roll x 2 minutes  Seated back stretch with ball roll x 2 minutes     Written Home Exercises Provided: Reviewed HEP provided during initial evaluation.   Pt demo good understanding of the education provided. Saul demonstrated good return demonstration of activities.     Education provided re:Saul verbalized good understanding of education provided.   No spiritual or educational barriers to learning provided    Assessment     Saul tolerated  treatment well today. Patient demonstrates decreased muscle flexibility through RLE compared to LLE with cues needed to maintain pain free ROM when stretching. Patient demonstrates poor transverse abdominus recruitment with heavy rectus activation and cues needed to promote breathing. Patient with good tolerance to hip stabilization exercises with training effect easily achieved in lateral gluteal musculature.   This is a 52 y.o. male referred to outpatient physical therapy and presents with a medical diagnosis of lower back pain and demonstrates limitations as described in the problem list. Pt prognosis is Good. Pt will continue to benefit from skilled outpatient physical therapy to address the deficits listed in the problem list, provide pt/family education and to maximize pt's level of independence in the home and community environment.     Goals as follows:  Short Term GOALS: 6 weeks. Pt agrees with goals set.  1. Decreased pain 20-40%   2. Demonstrate spinal stability with core activation during transitional movements.   3. Increased tissue extensibility and muscle flexibility in the posterior chain demonstrated with functional long sitting   4. Patient demonstrates independence with HEP.   5. Patient demonstrates independence with Postural Awareness.   6. Patient demonstrates performance of exercises without exacerbation of pain.       Long Term GOALS: 12 weeks. Pt agrees with goals set.  1. Patient demonstrates increased BLE flexibility with SLR 70/80  to improve tolerance to functional activities.   2. Patient demonstrates increased strength in the back extensors / trunk mobilizers to improve tolerance to functional activities.   3. Patient demonstrates improved overall function and return demonstration to functional ADL and recreational lifestyle     Plan     Continue with established Plan of Care towards PT goals.    Therapist: Consuelo De Jesus, PTA  3/23/2018

## 2018-03-28 ENCOUNTER — CLINICAL SUPPORT (OUTPATIENT)
Dept: REHABILITATION | Facility: HOSPITAL | Age: 53
End: 2018-03-28
Attending: NEUROLOGICAL SURGERY
Payer: COMMERCIAL

## 2018-03-28 DIAGNOSIS — M99.03 SEGMENTAL AND SOMATIC DYSFUNCTION OF LUMBAR REGION: ICD-10-CM

## 2018-03-28 DIAGNOSIS — M53.80 BACK TIGHTNESS: ICD-10-CM

## 2018-03-28 DIAGNOSIS — R29.898 WEAKNESS OF BOTH HIPS: ICD-10-CM

## 2018-03-28 DIAGNOSIS — Z98.890 S/P LUMBAR MICRODISCECTOMY: Primary | ICD-10-CM

## 2018-03-28 DIAGNOSIS — M62.81 WEAKNESS OF TRUNK MUSCULATURE: ICD-10-CM

## 2018-03-28 DIAGNOSIS — Z74.09 IMPAIRED MOBILITY AND ACTIVITIES OF DAILY LIVING: ICD-10-CM

## 2018-03-28 DIAGNOSIS — Z78.9 IMPAIRED MOBILITY AND ACTIVITIES OF DAILY LIVING: ICD-10-CM

## 2018-03-28 PROCEDURE — 97110 THERAPEUTIC EXERCISES: CPT | Mod: PN | Performed by: PHYSICAL THERAPIST

## 2018-03-28 NOTE — PROGRESS NOTES
"                                          Physical Therapy Daily Note           Name: Saul Hancock  [facility] Number: [id number]  Date of Treatment: 03/28/2018   Diagnosis:   Encounter Diagnoses   Name Primary?    S/P lumbar microdiscectomy Yes    Back tightness     Weakness of trunk musculature     Weakness of both hips        Time in: 1105  Time Out: 1215  Total Treatment Time: 70    VISITS / MURO: 3/20 - 12/31/2018  BOLD=INDICATES ACTIVITIES PERFORMED.      Subjective  Patient states " the back is not hurting. He relates to pain in other areas like the shoulder and the groin. Took pain pills this AM as a precaution to my response to therapy.  When I get pain it is mainly in the back."     Pain level - 0/10 low back     Objective    Treatment: Patient  was instructed in and performed therapeutic exercises to develop strength, ROM, flexibility, posture and core stabilization. Patient performed therapeutic exercises consisting of the following    Nu-Step 8'   Leg press   Recumbent bike  Upright bike   UE ergometer  Treadmill   Elliptical       THERAPEUTIC EXERCISE  SUPINE  LTR with opp arm OH x15 ea.   DKTC with physioball x 2 minutes  Supine piriformis stretch 10" x 6  Supine hip flexor stretch 10" x 6   Posterior pelvic tilt x x20   PPT into bridge 1x10  TrA Activation 7" hold x 12  TrA with mini marches 5" hold x 15      SIDELYING  Hip adduction ball squeeze 5" x 12  SL clamshells (GTB) 2x10    PRONE  -ball squeeze w/multifidus activ and bilat arm raise (side)   -donkey x12    STANDING.      SITTING  Seated hamstring stretch with ball roll x 2 minutes  Seated back stretch with ball roll x 2 minutes        MANUAL THERAPY  MODALITY  DIRECT EDUCATION:      Patient was issued HEP .  Written Home Exercises Reviewed.     Assessment    Completed activities. Patient reports that he hurts after the therapy but knows that it is necessary. The activities are directed at core activation and strengthening along with hip " strengthening and increased flexibility of the back, hamstrings and hip musculature. He was not limited and completed all activities as noted.   Medical Necessity is demonstrated by:  challenged/ difficulty  to participate in daily activities and  to participate in vocational pursuits, Pain limits function of effected part for some activities, Requires skilled supervision to complete and progress treatment interventions and HEP.  Pt demo good understanding of the education provided. Patient demonstrated good return demonstration of activities.Patient's tolerance to treatment was good. Patient will continue to benefit from skilled PTintervention.Patient is making progress towards established goals.  New/Revised goals: no  Continue with established Plan of Care towards PT goals.       PLAN     Certification Period: 3/13/2018 to 6/13/2018  Recommended Treatment Plan: 2 times per week for 12 weeks: Manual Therapy, Patient Education and Therapeutic Exercise  Other Recommendations:   . Pt may be seen by PTA as part of the rehabilitation       Therapist: Montana Rosenberg PT

## 2018-03-29 ENCOUNTER — CLINICAL SUPPORT (OUTPATIENT)
Dept: REHABILITATION | Facility: HOSPITAL | Age: 53
End: 2018-03-29
Attending: NEUROLOGICAL SURGERY
Payer: COMMERCIAL

## 2018-03-29 DIAGNOSIS — R29.898 WEAKNESS OF BOTH HIPS: ICD-10-CM

## 2018-03-29 DIAGNOSIS — M62.81 WEAKNESS OF TRUNK MUSCULATURE: ICD-10-CM

## 2018-03-29 DIAGNOSIS — Z98.890 S/P LUMBAR MICRODISCECTOMY: Primary | ICD-10-CM

## 2018-03-29 DIAGNOSIS — M99.03 SEGMENTAL AND SOMATIC DYSFUNCTION OF LUMBAR REGION: ICD-10-CM

## 2018-03-29 DIAGNOSIS — M53.80 BACK TIGHTNESS: ICD-10-CM

## 2018-03-29 PROCEDURE — 97110 THERAPEUTIC EXERCISES: CPT | Mod: PN | Performed by: PHYSICAL THERAPIST

## 2018-03-29 NOTE — PROGRESS NOTES
"                                          Physical Therapy Daily Note           Name: Saul Hancock  Clinic Number: [id number]  Date of Treatment: 03/29/2018   Diagnosis:   Encounter Diagnoses   Name Primary?    S/P lumbar microdiscectomy Yes    Back tightness     Weakness of trunk musculature     Weakness of both hips     Segmental and somatic dysfunction of lumbar region        Time in: 1110  Time Out: 1220  Total Treatment Time: 70    VISITS / MURO: 4/20 - 12/31/2018  BOLD=INDICATES ACTIVITIES PERFORMED.      Subjective  Patient states "the back is feeling fine today. There is only a pulling on the right side of the low back.   Pain level - 0/10 low back     Objective    Treatment: Patient  was instructed in and performed therapeutic exercises to develop strength, ROM, flexibility, posture and core stabilization. Patient performed therapeutic exercises consisting of the following    Nu-Step 8'   Leg press   Recumbent bike  Upright bike   UE ergometer  Treadmill   Elliptical       THERAPEUTIC EXERCISE  SUPINE  LTR with opp arm OH x15 ea.   DKTC with OH arms x20   Supine piriformis stretch 10" x 6  Supine hip flexor stretch 10" x 6   Posterior pelvic tilt x x20   PPT into bridge 1x15  TrA Activation 7" hold x 12  TrA with mini marches 5" hold x 15      SIDELYING  Hip adduction ball squeeze 5" x 12  SL clamshells (GTB) 2x10    PRONE  -ball squeeze w/multifidus activ and bilat arm raise (side) 5" x 12  -donkey x12  -leg lift x12   -fire hydrant x12     STANDING.      SITTING  Seated hamstring stretch with ball roll x 2 minutes  Seated back stretch with ball roll x 2 minutes        MANUAL THERAPY  MODALITY  DIRECT EDUCATION:      Patient was issued HEP .  Written Home Exercises Reviewed.     Assessment    Mr. Hancock performed his routine with significant verbal cueing. Return demonstration is not satisfactory at this time for HEP independence. No limitation with the routine due to pain. Will progress with " strengthening .   Medical Necessity is demonstrated by:  challenged/ difficulty  to participate in daily activities and  to participate in vocational pursuits, Pain limits function of effected part for some activities, Requires skilled supervision to complete and progress treatment interventions and HEP.  Pt demo good understanding of the education provided. Patient demonstrated fair return demonstration of activities.Patient's tolerance to treatment was good. Patient will continue to benefit from skilled PTintervention.Patient is making progress towards established goals.  New/Revised goals: no  Continue with established Plan of Care towards PT goals.       PLAN     Certification Period: 3/13/2018 to 6/13/2018  Recommended Treatment Plan: 2 times per week for 12 weeks: Manual Therapy, Patient Education and Therapeutic Exercise  Other Recommendations:   . Pt may be seen by PTA as part of the rehabilitation       Therapist: Montana Rosenberg, PT

## 2018-04-02 ENCOUNTER — CLINICAL SUPPORT (OUTPATIENT)
Dept: REHABILITATION | Facility: HOSPITAL | Age: 53
End: 2018-04-02
Attending: NEUROLOGICAL SURGERY
Payer: COMMERCIAL

## 2018-04-02 DIAGNOSIS — M99.03 SEGMENTAL AND SOMATIC DYSFUNCTION OF LUMBAR REGION: ICD-10-CM

## 2018-04-02 DIAGNOSIS — M53.80 BACK TIGHTNESS: ICD-10-CM

## 2018-04-02 DIAGNOSIS — M62.81 WEAKNESS OF TRUNK MUSCULATURE: ICD-10-CM

## 2018-04-02 DIAGNOSIS — Z74.09 IMPAIRED MOBILITY AND ACTIVITIES OF DAILY LIVING: ICD-10-CM

## 2018-04-02 DIAGNOSIS — Z98.890 S/P LUMBAR MICRODISCECTOMY: Primary | ICD-10-CM

## 2018-04-02 DIAGNOSIS — Z78.9 IMPAIRED MOBILITY AND ACTIVITIES OF DAILY LIVING: ICD-10-CM

## 2018-04-02 PROCEDURE — 97110 THERAPEUTIC EXERCISES: CPT | Mod: PN

## 2018-04-02 NOTE — PROGRESS NOTES
"                                      Physical Therapy Daily Note       Name: Saul Hancock  Clinic Number: [id number]  Date of Treatment: 04/02/2018   Diagnosis:   Encounter Diagnoses   Name Primary?    S/P lumbar microdiscectomy Yes    Back tightness     Weakness of trunk musculature     Segmental and somatic dysfunction of lumbar region     Impaired mobility and activities of daily living      Time in: 1100  Time Out: 1150  Total Treatment Time: 50 minutes (1:1 with PTA 25 minutes of treatment session)     VISITS / MURO: 5/20 - 12/31/2018  BOLD=INDICATES ACTIVITIES PERFORMED.    Subjective  Saul states he has increased lower back pain and he has been sore since his last therapy appointment. Patient states he doesn't have any pain in the legs or groin but he has an increase in lower back pain. Patient states his lower back pain is 7/10 today.     Objective    Treatment: Patient was instructed in and performed therapeutic exercises to develop strength, ROM, flexibility, posture and core stabilization. Patient performed therapeutic exercises consisting of the following    Nu-Step 8'   Leg press   Recumbent bike  Upright bike x 7 minutes  UE ergometer  Treadmill   Elliptical       THERAPEUTIC EXERCISE  SUPINE  LTR with opp arm OH x15 ea.   DKTC with OH arms x20   Supine piriformis stretch 10" x 6  Supine hip flexor stretch 10" x 6 (modifed to prone today)  Posterior pelvic tilt x x20   PPT into bridge 2x10  TrA Activation 7" hold x 12  TrA with mini marches 5" hold x 15    SIDELYING  Hip adduction ball squeeze 5" x 12  SL clamshells (GTB) 2x10    PRONE  +Hip flexor stretch 20 sec x 3   -ball squeeze w/multifidus activ and bilat arm raise (side) 5" x 12  -donkey x12  -leg lift x12   -fire hydrant x12     STANDING.    SITTING  Seated hamstring stretch with ball roll x 2 minutes  Seated back stretch with ball roll x 2 minutes     MANUAL THERAPY  MODALITY  DIRECT EDUCATION:      Patient was issued HEP .  Written " Home Exercises Reviewed.     Assessment    Saul tolerated treatment well today. Supine hip flexor stretch modified from supine to prone today due to lack of core activation and increased lumbar extension noted in supine position. Patient also required cueing when performing supine bridging to maintain gluteal activation in order to reduce lumbar extension compensation with good correction achieved. Patient with favorable response to skilled care today with reduction in subjective pain complaints achieved with therapeutic exercise.   Medical Necessity is demonstrated by:  challenged/ difficulty  to participate in daily activities and  to participate in vocational pursuits, Pain limits function of effected part for some activities, Requires skilled supervision to complete and progress treatment interventions and HEP.  Pt demo good understanding of the education provided. Patient demonstrated fair return demonstration of activities.Patient's tolerance to treatment was good. Patient will continue to benefit from skilled PTintervention.Patient is making progress towards established goals.  New/Revised goals: no  Continue with established Plan of Care towards PT goals.     PLAN     Certification Period: 3/13/2018 to 6/13/2018  Recommended Treatment Plan: 2 times per week for 12 weeks: Manual Therapy, Patient Education and Therapeutic Exercise  Other Recommendations:   . Pt may be seen by PTA as part of the rehabilitation       Therapist: Consuelo De Jesus PTA  4/2/2018

## 2018-04-04 ENCOUNTER — CLINICAL SUPPORT (OUTPATIENT)
Dept: REHABILITATION | Facility: HOSPITAL | Age: 53
End: 2018-04-04
Attending: NEUROLOGICAL SURGERY
Payer: COMMERCIAL

## 2018-04-04 DIAGNOSIS — M53.80 BACK TIGHTNESS: ICD-10-CM

## 2018-04-04 DIAGNOSIS — Z98.890 S/P LUMBAR MICRODISCECTOMY: Primary | ICD-10-CM

## 2018-04-04 DIAGNOSIS — M62.81 WEAKNESS OF TRUNK MUSCULATURE: ICD-10-CM

## 2018-04-04 DIAGNOSIS — M99.03 SEGMENTAL AND SOMATIC DYSFUNCTION OF LUMBAR REGION: ICD-10-CM

## 2018-04-04 DIAGNOSIS — Z74.09 IMPAIRED MOBILITY AND ACTIVITIES OF DAILY LIVING: ICD-10-CM

## 2018-04-04 DIAGNOSIS — Z78.9 IMPAIRED MOBILITY AND ACTIVITIES OF DAILY LIVING: ICD-10-CM

## 2018-04-04 DIAGNOSIS — R29.898 WEAKNESS OF BOTH HIPS: ICD-10-CM

## 2018-04-04 PROCEDURE — 97110 THERAPEUTIC EXERCISES: CPT | Mod: PN

## 2018-04-04 NOTE — PROGRESS NOTES
"                                      Physical Therapy Daily Note       Name: Saul Hancock  Clinic Number: [id number]  Date of Treatment: 04/04/2018   Diagnosis:   Encounter Diagnoses   Name Primary?    S/P lumbar microdiscectomy Yes    Back tightness     Weakness of trunk musculature     Segmental and somatic dysfunction of lumbar region     Impaired mobility and activities of daily living     Weakness of both hips      Time in: 1100  Time Out: 1155  Total Treatment Time: 55 minutes (1:1 with PTA 30 minutes of treatment session)     VISITS / MURO: 6/20 - 12/31/2018  BOLD=INDICATES ACTIVITIES PERFORMED.    Subjective  Saul states he did feel better after last treatment session. Patient states he is taking 1 pain pill a day but he will sometimes take 2 on therapy days. Patient states his lower back pain is 7/10 today.     Objective    Treatment: Patient was instructed in and performed therapeutic exercises to develop strength, ROM, flexibility, posture and core stabilization. Patient performed therapeutic exercises consisting of the following    Nu-Step 8'   Leg press   Recumbent bike  Upright bike x 7 minutes  UE ergometer  Treadmill   Elliptical     THERAPEUTIC EXERCISE  SUPINE  LTR with opp arm OH x15 ea.   DKTC with OH arms x20   Supine piriformis stretch 10" x 6  Supine hip flexor stretch 10" x 6 (modifed to prone today)  Posterior pelvic tilt x x20   PPT into bridge 2x10  +PPT with BUE lifts 2x10  TrA Activation 7" hold x 12  TrA with SLR 1x10 ea   TrA with mini marches 5" hold x 15    SIDELYING  Hip adduction ball squeeze 5" x 12  SL clamshells (GTB) 2x10    PRONE  +Hip flexor stretch x 1 minute x 2    -ball squeeze w/multifidus activ and bilat arm raise (side) 5" x 12  -donkey x12  -leg lift x12   -fire hydrant x12     STANDING.    SITTING  Seated hamstring stretch with ball roll x 2 minutes  Seated back stretch with ball roll x 2 minutes     MANUAL THERAPY  MODALITY  DIRECT EDUCATION:     Patient " was issued HEP .  Written Home Exercises Reviewed.     Assessment    Saul tolerated treatment well today. Patient demonstrates poor neuromuscular control of core and hip musculature as he is unable to maintain muscle activation while dual tasking. Patient continues with heavy tendency to over activate rectus and hold breath while exercising requiring cues throughout session for correction.   Medical Necessity is demonstrated by:  challenged/ difficulty  to participate in daily activities and  to participate in vocational pursuits, Pain limits function of effected part for some activities, Requires skilled supervision to complete and progress treatment interventions and HEP.  Pt demo good understanding of the education provided. Patient demonstrated fair return demonstration of activities.Patient's tolerance to treatment was good. Patient will continue to benefit from skilled PTintervention.Patient is making progress towards established goals.  New/Revised goals: no  Continue with established Plan of Care towards PT goals.     PLAN     Certification Period: 3/13/2018 to 6/13/2018  Recommended Treatment Plan: 2 times per week for 12 weeks: Manual Therapy, Patient Education and Therapeutic Exercise  Other Recommendations: Pt may be seen by PTA as part of the rehabilitation     Therapist: Consuelo De Jesus PTA  4/4/2018

## 2018-04-09 ENCOUNTER — CLINICAL SUPPORT (OUTPATIENT)
Dept: REHABILITATION | Facility: HOSPITAL | Age: 53
End: 2018-04-09
Attending: NEUROLOGICAL SURGERY
Payer: COMMERCIAL

## 2018-04-09 DIAGNOSIS — Z74.09 IMPAIRED MOBILITY AND ACTIVITIES OF DAILY LIVING: ICD-10-CM

## 2018-04-09 DIAGNOSIS — Z98.890 S/P LUMBAR MICRODISCECTOMY: ICD-10-CM

## 2018-04-09 DIAGNOSIS — R29.898 WEAKNESS OF BOTH HIPS: ICD-10-CM

## 2018-04-09 DIAGNOSIS — M99.03 SEGMENTAL AND SOMATIC DYSFUNCTION OF LUMBAR REGION: Primary | ICD-10-CM

## 2018-04-09 DIAGNOSIS — M62.81 WEAKNESS OF TRUNK MUSCULATURE: ICD-10-CM

## 2018-04-09 DIAGNOSIS — M53.80 BACK TIGHTNESS: ICD-10-CM

## 2018-04-09 DIAGNOSIS — Z78.9 IMPAIRED MOBILITY AND ACTIVITIES OF DAILY LIVING: ICD-10-CM

## 2018-04-09 PROCEDURE — 97110 THERAPEUTIC EXERCISES: CPT | Mod: PN | Performed by: PHYSICAL THERAPIST

## 2018-04-09 NOTE — PROGRESS NOTES
"                                          Physical Therapy Daily Note           Name: Saul Hancock  [facility] Number: [id number]  Date of Treatment: 04/09/2018   Diagnosis:   Encounter Diagnoses   Name Primary?    Segmental and somatic dysfunction of lumbar region Yes    Weakness of trunk musculature     Back tightness     S/P lumbar microdiscectomy     Impaired mobility and activities of daily living     Weakness of both hips        Time in: 1110  Time Out: 1210  Total Treatment Time: 60    VISITS / MURO: 7/20 - 12/31/2018  BOLD=INDICATES ACTIVITIES PERFORMED.      Subjective  Patient states "the back always hurts but there is no leg pain or shooting pain. Difficulty to sit / stand. Pain at night.  Pain level - 5/10 low back     Objective    Treatment: Patient  was instructed in and performed therapeutic exercises to develop strength, ROM, flexibility, posture and core stabilization. Patient performed therapeutic exercises consisting of the following    Nu-Step 8'   Leg press   Recumbent bike  Upright bike   UE ergometer  Treadmill   Elliptical       THERAPEUTIC EXERCISE    SUPINE  LTR with opp arm OH x15 ea.   DKTC with OH arms x20   Supine piriformis stretch 10" x 6  Supine hip flexor stretch 10" x 6   Posterior pelvic tilt x x20   PPT into bridge 1x15  TrA Activation 7" hold x 12  TrA with mini marches 5" hold x 15      SIDELYING  Hip adduction ball squeeze 5" x 12  SL clamshells (GTB) x15  Reverse calms x15      PRONE  -ball squeeze w/multifidus activ and bilat arm raise (Y) 5" x 12  -donkey x12  -leg lift x12   -fire hydrant x12     STANDING.      SITTING  Seated hamstring stretch with ball roll x 2 minutes  Seated back stretch with ball roll x 2 minutes        MANUAL THERAPY  MODALITY  DIRECT EDUCATION:      Patient was issued HEP .  Written Home Exercises Reviewed.     Assessment    Activities completed as noted. His mobility is not limited by pain. There was no exacerbation of the patients pain at the " end of the session. He is challenged with prone activities from hip weakness.                                                                                                     Medical Necessity is demonstrated by:  challenged/ difficulty  to participate in daily activities and  to participate in vocational pursuits, Pain limits function of effected part for some activities, Requires skilled supervision to complete and progress treatment interventions and HEP.  Pt demo good understanding of the education provided. Patient demonstrated fair return demonstration of activities.Patient's tolerance to treatment was good. Patient will continue to benefit from skilled PTintervention.Patient is making progress towards established goals.  New/Revised goals: no  Continue with established Plan of Care towards PT goals.       PLAN     Certification Period: 3/13/2018 to 6/13/2018  Recommended Treatment Plan: 2 times per week for 12 weeks: Manual Therapy, Patient Education and Therapeutic Exercise  Other Recommendations:   . Pt may be seen by PTA as part of the rehabilitation       Therapist: Montana Rosenberg, PT

## 2018-04-11 ENCOUNTER — CLINICAL SUPPORT (OUTPATIENT)
Dept: REHABILITATION | Facility: HOSPITAL | Age: 53
End: 2018-04-11
Attending: NEUROLOGICAL SURGERY
Payer: COMMERCIAL

## 2018-04-11 DIAGNOSIS — Z98.890 S/P LUMBAR MICRODISCECTOMY: ICD-10-CM

## 2018-04-11 DIAGNOSIS — M62.81 WEAKNESS OF TRUNK MUSCULATURE: ICD-10-CM

## 2018-04-11 DIAGNOSIS — M99.03 SEGMENTAL AND SOMATIC DYSFUNCTION OF LUMBAR REGION: Primary | ICD-10-CM

## 2018-04-11 DIAGNOSIS — M53.80 BACK TIGHTNESS: ICD-10-CM

## 2018-04-11 PROCEDURE — 97110 THERAPEUTIC EXERCISES: CPT | Mod: PN | Performed by: PHYSICAL THERAPIST

## 2018-04-11 NOTE — PROGRESS NOTES
"                                          Physical Therapy Daily Note           Name: Saul Hancock  [facility] Number: [id number]  Date of Treatment: 04/11/2018   Diagnosis:   Encounter Diagnoses   Name Primary?    Segmental and somatic dysfunction of lumbar region Yes    Weakness of trunk musculature     Back tightness     S/P lumbar microdiscectomy        Time in: 1110  Time Out: 12  Total Treatment Time:     VISITS / MURO: 8/20 - 12/31/2018  BOLD=INDICATES ACTIVITIES PERFORMED.      Subjective  Patient states "the back is hurting as usual but he says he is good. The pain is  Not too bad.       Pain level - 3-4/10 low back     Objective    Treatment: Patient  was instructed in and performed therapeutic exercises to develop strength, ROM, flexibility, posture and core stabilization. Patient performed therapeutic exercises consisting of the following    Nu-Step 8'   Leg press   Recumbent bike  Upright bike   UE ergometer  Treadmill   Elliptical       THERAPEUTIC EXERCISE    SUPINE  LTR with opp arm OH x15 ea.   DKTC with OH arms x20   Supine piriformis stretch 10" x 6  Supine hip flexor stretch 10" x 6   Posterior pelvic tilt x x20   PPT into bridge 1x15  TrA Activation 7" hold x 12  TrA with mini marches 5" hold x 15      SIDELYING  Hip adduction ball squeeze 5" x 12  SL clamshells (GTB) x15  Reverse calms x15      PRONE  -ball squeeze w/multifidus activ and bilat arm raise (Y) 5" x 12  -donkey x12  -leg lift x12   -fire hydrant x12     STANDING.  -leg swings abd/ext x20  -swimmers x10   -cord rotations x15 GTB   -arm raise / pull back - Timmy TB x20   -arm swings over T-ball x15         SITTING  Seated hamstring stretch with ball roll x 2 minutes  Seated back stretch with ball roll x 2 minutes        MANUAL THERAPY  MODALITY  DIRECT EDUCATION:      Patient was issued HEP .  Written Home Exercises Reviewed.     Assessment  Activities performed with some challenge to standing activities. He completed all without " limitations from reported pain. Core weakness evident with compensations noted during standing activities.  It appeared there was no increased pain.                            Medical Necessity is demonstrated by:  challenged/ difficulty  to participate in daily activities and  to participate in vocational pursuits, Pain limits function of effected part for some activities, Requires skilled supervision to complete and progress treatment interventions and HEP.  Pt demo good understanding of the education provided. Patient demonstrated fair return demonstration of activities.Patient's tolerance to treatment was good. Patient will continue to benefit from skilled PTintervention.Patient is making progress towards established goals.  New/Revised goals: no  Continue with established Plan of Care towards PT goals.       PLAN     Certification Period: 3/13/2018 to 6/13/2018  Recommended Treatment Plan: 2 times per week for 12 weeks: Manual Therapy, Patient Education and Therapeutic Exercise  Other Recommendations:   . Pt may be seen by PTA as part of the rehabilitation       Therapist: Montana Rosenberg PT

## 2018-06-13 ENCOUNTER — TELEPHONE (OUTPATIENT)
Dept: NEUROSURGERY | Facility: CLINIC | Age: 53
End: 2018-06-13

## 2018-06-13 NOTE — TELEPHONE ENCOUNTER
----- Message from Mesfin Rajput sent at 6/13/2018 12:51 PM CDT -----  Contact: self/[phone]  Patient would like to be seen for a sooner appointment due this is a surgery follow up.      Please call and advise.

## 2018-06-15 ENCOUNTER — OFFICE VISIT (OUTPATIENT)
Dept: NEUROSURGERY | Facility: CLINIC | Age: 53
End: 2018-06-15
Payer: COMMERCIAL

## 2018-06-15 VITALS
HEART RATE: 81 BPM | HEIGHT: 75 IN | WEIGHT: 238.19 LBS | DIASTOLIC BLOOD PRESSURE: 109 MMHG | SYSTOLIC BLOOD PRESSURE: 168 MMHG | BODY MASS INDEX: 29.62 KG/M2

## 2018-06-15 DIAGNOSIS — Z98.890 S/P LUMBAR MICRODISCECTOMY: Primary | ICD-10-CM

## 2018-06-15 PROCEDURE — 3077F SYST BP >= 140 MM HG: CPT | Mod: CPTII,S$GLB,, | Performed by: PHYSICIAN ASSISTANT

## 2018-06-15 PROCEDURE — 3080F DIAST BP >= 90 MM HG: CPT | Mod: CPTII,S$GLB,, | Performed by: PHYSICIAN ASSISTANT

## 2018-06-15 PROCEDURE — 99211 OFF/OP EST MAY X REQ PHY/QHP: CPT | Mod: S$GLB,,, | Performed by: PHYSICIAN ASSISTANT

## 2018-06-15 PROCEDURE — 99999 PR PBB SHADOW E&M-EST. PATIENT-LVL III: CPT | Mod: PBBFAC,,, | Performed by: PHYSICIAN ASSISTANT

## 2018-06-15 RX ORDER — NAPROXEN SODIUM 220 MG
220 TABLET ORAL
COMMUNITY
End: 2019-06-14

## 2018-06-15 NOTE — PROGRESS NOTES
"[facility] - Neurosurgery  Progress Note      SUBJECTIVE:     Chief Complaint/Reason for Visit: surgery follow up     History of Present Illness:  Saul Hancock is a 53 y.o. male who is 6 months s/p right L5-S1 microdiscectomy. Patient reports he completed physical therapy and overall is feeling better. He reports some pain when he transitions for the lying to sitting or sitting to standing positions. He reports numbness in his right foot. Denies significant back pain. Denies leg pain. He reports his walking has improved. He is a  and is currently on light duty. He is in clinic today to determine if he can continue light duty for an additional 3 months or if he can return full duty.     Review of patient's allergies indicates:   Allergen Reactions    Corticosteroids (glucocorticoids)      "Psychosis"    Cashew nut Rash       Past Medical History:   Diagnosis Date    Anxiety     Arthritis     Sciatica      Past Surgical History:   Procedure Laterality Date    LUMBAR EPIDURAL INJECTION  11/14/2017     Family History   Problem Relation Age of Onset    Hypertension Mother     Lung cancer Father     No Known Problems Sister     Leukemia Brother     No Known Problems Brother     No Known Problems Brother      Social History   Substance Use Topics    Smoking status: Never Smoker    Smokeless tobacco: Never Used    Alcohol use No        Review of Systems:  Constitutional: no fever, chills or night sweats. No changes in weight   Musculoskeletal: no arthralgias or myalgias.   Neurological: no seizures or tremors     OBJECTIVE:     Vital Signs (Most Recent):  Pulse: 81 (06/15/18 1105)  BP: (!) 168/109 (06/15/18 1105)    Physical Exam:  General: well developed, well nourished, no distress.   Neurologic: Alert and oriented. Thought content appropriate.  Head: normocephalic, atraumatic  Eyes:  EOMI.  Neck: trachea midline, no JVD   Cardiovascular: no LE edema  Pulmonary: normal respirations, no signs " of respiratory distress  Skin: Skin is warm, dry and intact.    Motor Strength: Moves all extremities spontaneously with good tone.  Full strength upper and lower extremities. No abnormal movements seen.    Iliopsoas Quadriceps Knee  Flexion Tibialis  anterior Gastro- cnemius   R 5/5 5/5 5/5 5/5 5/5   L 5/5 5/5 5/5 5/5 5/5     Gait: normal      Diagnostic Results:  N/A    ASSESSMENT/PLAN:     54 yo male 6 mo s/p L5-S1 microdiscectomy, persistent right foot numbness    -Message sent to Dr. Hernandez about releasing patient now vs in 3 months  -Will call patient on Monday with his recommendations  -Tentative follow up with Dr. Hernandez in 3 months scheduled  -Encouraged to call with any questions or concerns       Consuelo Xiao PA-C  Neurosurgery

## 2018-08-13 NOTE — INTERVAL H&P NOTE
HPI Comments: EMERGENCY DEPARTMENT HISTORY AND PHYSICAL EXAM    9:01 PM      Date: 8/12/2018  Patient Name: West Cisneros    History of Presenting Illness    Patient presents with:  Abdominal Pain  Pancreatitis        History Provided By: Patient    Chief Complaint: Abdominal Pain  Duration:  Days  Timing:  Acute on Chronic  Severity: Severe  Modifying Factors: EtOH use, chronic pancreatitis  Associated Symptoms: Constipation, dehydrated      Additional History (Context): 9:04 PM West Cisneros is a 36 y.o. male with h/o pancreatitis who presents to ED complaining of acute on chronic abdominal pain, 10/10 severity, with associated symptoms of constipation and dehydration onset 2-3 days ago. Last BM 3 days ago. Patient notes consumption of EtOH 3 days ago. No allergies. Asprin did not alleviate pain. No other concerns or symptoms at this time. PCP: None      Patient is a 36 y.o. male presenting with abdominal pain and pancreatitis. The history is provided by the patient. Abdominal Pain    This is a recurrent problem. The current episode started more than 2 days ago. The problem occurs constantly. The problem has been gradually worsening. The pain is associated with ETOH use. The pain is located in the generalized abdominal region. The pain is at a severity of 10/10. The pain is severe. Associated symptoms include constipation. Pertinent negatives include no fever, no diarrhea, no nausea, no vomiting, no dysuria, no hematuria, no chest pain and no back pain. His past medical history is significant for pancreatitis. Pancreatitis    This is a chronic problem. The current episode started more than 1 week ago. The pain is associated with ETOH use. Associated symptoms include constipation. Pertinent negatives include no fever, no diarrhea, no nausea, no vomiting, no dysuria, no hematuria, no chest pain and no back pain. His past medical history is significant for pancreatitis.         Past Medical History: The patient has been examined and the H&P has been reviewed:    I concur with the findings and no changes have occurred since H&P was written.    No change in the location or quality of the pain since the most recent clinic visit.  No new symptoms.  He wishes to proceed with the procedure today without steroids as patient has an adverse reaction to steroids.    PE, unchanged from previous:  CV:  RRR  Resp: unlabored, no wheezing.    NPO since MN. Will proceed with right L5 and S1 TF epidural injections without steroid.    Anesthesia/Surgery risks, benefits and alternative options discussed and understood by patient/family.          There are no hospital problems to display for this patient.     Diagnosis Date    Pancreatitis        No past surgical history on file. No family history on file. Social History     Social History    Marital status:      Spouse name: N/A    Number of children: N/A    Years of education: N/A     Occupational History    Not on file. Social History Main Topics    Smoking status: Current Every Day Smoker     Packs/day: 0.50     Years: 14.00    Smokeless tobacco: Not on file    Alcohol use Yes      Comment: rarely    Drug use: No    Sexual activity: Yes     Partners: Female     Other Topics Concern    Not on file     Social History Narrative         ALLERGIES: Review of patient's allergies indicates no known allergies. Review of Systems   Constitutional: Negative for activity change, fatigue and fever. HENT: Negative for congestion and rhinorrhea. Eyes: Negative for visual disturbance. Respiratory: Negative for shortness of breath. Cardiovascular: Negative for chest pain and palpitations. Gastrointestinal: Positive for abdominal pain and constipation. Negative for diarrhea, nausea and vomiting. Genitourinary: Negative for dysuria and hematuria. Musculoskeletal: Negative for back pain. Skin: Negative for rash. Neurological: Negative for dizziness, weakness and light-headedness. All other systems reviewed and are negative. Vitals:    08/12/18 2215 08/12/18 2230 08/12/18 2245 08/12/18 2300   BP: (!) 144/95 126/76 137/84 147/82   Pulse: 68 68 72 69   Resp: 18 15 16 16   Temp:       SpO2: 98% 97% 96% 98%   Weight:       Height:                Physical Exam   Constitutional: He is oriented to person, place, and time. He appears well-developed and well-nourished. HENT:   Head: Normocephalic and atraumatic. Eyes: Conjunctivae and EOM are normal. Pupils are equal, round, and reactive to light. Neck: Normal range of motion. Neck supple. Cardiovascular: Normal rate and regular rhythm.     Pulmonary/Chest: Effort normal and breath sounds normal.   Abdominal: Soft. Bowel sounds are normal. There is tenderness. Generalized tenderness    Musculoskeletal: Normal range of motion. Neurological: He is alert and oriented to person, place, and time. He has normal reflexes. Skin: Skin is warm and dry. Psychiatric: He has a normal mood and affect. His behavior is normal. Judgment and thought content normal.   Nursing note and vitals reviewed. MDM  Number of Diagnoses or Management Options  Abdominal pain, generalized: established and improving  Constipation, unspecified constipation type: established and improving  Diagnosis management comments: Work up for pancreatitis is negative. It will treat the pt for constipation with lactulose and bentyl.   He will f/u with pcp and return if worse       Amount and/or Complexity of Data Reviewed  Clinical lab tests: ordered and reviewed  Tests in the radiology section of CPT®: ordered and reviewed  Review and summarize past medical records: yes  Independent visualization of images, tracings, or specimens: yes    Risk of Complications, Morbidity, and/or Mortality  Presenting problems: moderate  Diagnostic procedures: moderate  Management options: moderate    Patient Progress  Patient progress: stable        ED Course       Procedures                     Vitals:  Patient Vitals for the past 12 hrs:   Temp Pulse Resp BP SpO2   08/12/18 2300 - 69 16 147/82 98 %   08/12/18 2245 - 72 16 137/84 96 %   08/12/18 2230 - 68 15 126/76 97 %   08/12/18 2215 - 68 18 (!) 144/95 98 %   08/12/18 2200 - 68 19 (!) 131/94 98 %   08/12/18 2145 - 71 25 (!) 141/95 98 %   08/12/18 2130 - 75 19 (!) 162/95 99 %   08/12/18 2115 - 79 22 (!) 151/96 100 %   08/12/18 2030 98.8 °F (37.1 °C) 78 18 (!) 166/103 98 %       Medications ordered:   Medications   morphine injection 4 mg (4 mg IntraVENous Given 8/12/18 2101)   sodium chloride 0.9 % bolus infusion 1,000 mL (1,000 mL IntraVENous New Bag 8/12/18 2058)   sodium chloride 0.9 % bolus infusion 1,000 mL (1,000 mL IntraVENous New Bag 8/12/18 2058)   ondansetron (ZOFRAN) injection 8 mg (8 mg IntraVENous Given 8/12/18 2101)   ketorolac (TORADOL) injection 30 mg (30 mg IntraVENous Given 8/12/18 2101)         Lab findings:  Recent Results (from the past 12 hour(s))   CBC WITH AUTOMATED DIFF    Collection Time: 08/12/18  8:40 PM   Result Value Ref Range    WBC 11.3 4.6 - 13.2 K/uL    RBC 5.63 (H) 4.70 - 5.50 M/uL    HGB 16.5 (H) 13.0 - 16.0 g/dL    HCT 46.5 36.0 - 48.0 %    MCV 82.6 74.0 - 97.0 FL    MCH 29.3 24.0 - 34.0 PG    MCHC 35.5 31.0 - 37.0 g/dL    RDW 13.3 11.6 - 14.5 %    PLATELET 171 364 - 950 K/uL    MPV 12.1 (H) 9.2 - 11.8 FL    NEUTROPHILS 54 40 - 73 %    LYMPHOCYTES 36 21 - 52 %    MONOCYTES 7 3 - 10 %    EOSINOPHILS 2 0 - 5 %    BASOPHILS 1 0 - 2 %    ABS. NEUTROPHILS 6.2 1.8 - 8.0 K/UL    ABS. LYMPHOCYTES 4.0 (H) 0.9 - 3.6 K/UL    ABS. MONOCYTES 0.8 0.05 - 1.2 K/UL    ABS. EOSINOPHILS 0.2 0.0 - 0.4 K/UL    ABS. BASOPHILS 0.1 0.0 - 0.1 K/UL    DF AUTOMATED     PROTHROMBIN TIME + INR    Collection Time: 08/12/18  8:40 PM   Result Value Ref Range    Prothrombin time 11.6 11.5 - 15.2 sec    INR 0.9 0.8 - 1.2     METABOLIC PANEL, COMPREHENSIVE    Collection Time: 08/12/18  8:40 PM   Result Value Ref Range    Sodium 134 (L) 136 - 145 mmol/L    Potassium 4.7 3.5 - 5.5 mmol/L    Chloride 103 100 - 108 mmol/L    CO2 24 21 - 32 mmol/L    Anion gap 7 3.0 - 18 mmol/L    Glucose 220 (H) 74 - 99 mg/dL    BUN 10 7.0 - 18 MG/DL    Creatinine 1.11 0.6 - 1.3 MG/DL    BUN/Creatinine ratio 9 (L) 12 - 20      GFR est AA >60 >60 ml/min/1.73m2    GFR est non-AA >60 >60 ml/min/1.73m2    Calcium 8.6 8.5 - 10.1 MG/DL    Bilirubin, total 0.4 0.2 - 1.0 MG/DL    ALT (SGPT) 38 16 - 61 U/L    AST (SGOT) 28 15 - 37 U/L    Alk.  phosphatase 60 45 - 117 U/L    Protein, total 8.2 6.4 - 8.2 g/dL    Albumin 3.7 3.4 - 5.0 g/dL    Globulin 4.5 (H) 2.0 - 4.0 g/dL    A-G Ratio 0.8 0.8 - 1.7     LIPASE    Collection Time: 08/12/18  8:40 PM   Result Value Ref Range    Lipase 373 73 - 393 U/L   URINALYSIS W/ RFLX MICROSCOPIC    Collection Time: 08/12/18  9:59 PM   Result Value Ref Range    Color YELLOW      Appearance CLEAR      Specific gravity >1.030 (H) 1.005 - 1.030    pH (UA) 5.0 5.0 - 8.0      Protein 30 (A) NEG mg/dL    Glucose >1000 (A) NEG mg/dL    Ketone 40 (A) NEG mg/dL    Bilirubin NEGATIVE  NEG      Blood NEGATIVE  NEG      Urobilinogen 0.2 0.2 - 1.0 EU/dL    Nitrites NEGATIVE  NEG      Leukocyte Esterase NEGATIVE  NEG     URINE MICROSCOPIC ONLY    Collection Time: 08/12/18  9:59 PM   Result Value Ref Range    WBC NEGATIVE  0 - 4 /hpf    RBC NEGATIVE  0 - 5 /hpf    Epithelial cells NEGATIVE  0 - 5 /lpf    Bacteria NEGATIVE  NEG /hpf    Mucus FEW (A) NEG /lpf          X-Ray, CT or other radiology findings or impressions:  XR ABD (KUB)    (Results Pending)     +moderate burden of stool to colon, other wise negative per my read       Reevaluation of patient:   I have reassessed the patient. Patient is feeling better and is asking to go home    Disposition:    Diagnosis:   1. Abdominal pain, generalized    2. Constipation, unspecified constipation type        Disposition: to Home      Follow-up Information     Follow up With Details Comments [address] [facility] In 2 days  [address] [facility]  [phone]           Patient's Medications   Start Taking    DICYCLOMINE (BENTYL) 20 MG TABLET    Take 1 Tab by mouth every six (6) hours for 20 doses. LACTULOSE (CHRONULAC) 10 GRAM/15 ML SOLUTION    Take 30 mL by mouth two (2) times a day for 8 days. Continue Taking    HYDROCODONE-ACETAMINOPHEN (NORCO) 5-325 MG PER TABLET    Take 1-2 tablets PO every 4-6 hours as needed for pain control. If over the counter ibuprofen or acetaminophen was suggested, then only take the vicodin for pain not well controlled with the over the counter medication.     ONDANSETRON HCL (ZOFRAN, AS HYDROCHLORIDE,) 4 MG TABLET    Take 2 Tabs by mouth every eight (8) hours as needed for Nausea. OXYCODONE IR (ROXICODONE) 5 MG IMMEDIATE RELEASE TABLET    Take 1 Tab by mouth every four (4) hours as needed for Pain. Max Daily Amount: 30 mg. These Medications have changed    No medications on file   Stop Taking    No medications on file       Return to the ER if you are unable to obtain referral as directed. My Yanet  results have been reviewed with him. He has been counseled regarding his diagnosis, treatment, and plan. He verbally conveys understanding and agreement of the signs, symptoms, diagnosis, treatment and prognosis and additionally agrees to follow up as discussed. He also agrees with the care-plan and conveys that all of his questions have been answered. I have also provided discharge instructions for him that include: educational information regarding their diagnosis and treatment, and list of reasons why they would want to return to the ED prior to their follow-up appointment, should his condition change.         Nellie Paniagua ENP-C,FNP-C

## 2018-09-17 ENCOUNTER — OFFICE VISIT (OUTPATIENT)
Dept: NEUROSURGERY | Facility: CLINIC | Age: 53
End: 2018-09-17
Payer: COMMERCIAL

## 2018-09-17 VITALS — SYSTOLIC BLOOD PRESSURE: 150 MMHG | DIASTOLIC BLOOD PRESSURE: 96 MMHG | HEART RATE: 77 BPM

## 2018-09-17 DIAGNOSIS — Z98.890 S/P LUMBAR MICRODISCECTOMY: Primary | ICD-10-CM

## 2018-09-17 PROBLEM — M51.16 LUMBAR DISC HERNIATION WITH RADICULOPATHY: Status: RESOLVED | Noted: 2017-11-07 | Resolved: 2018-09-17

## 2018-09-17 PROBLEM — M99.03 SEGMENTAL AND SOMATIC DYSFUNCTION OF LUMBAR REGION: Status: RESOLVED | Noted: 2018-03-13 | Resolved: 2018-09-17

## 2018-09-17 PROBLEM — M48.061 SPINAL STENOSIS OF LUMBAR REGION WITHOUT NEUROGENIC CLAUDICATION: Status: RESOLVED | Noted: 2017-12-29 | Resolved: 2018-09-17

## 2018-09-17 PROBLEM — M53.80 BACK TIGHTNESS: Status: RESOLVED | Noted: 2018-03-13 | Resolved: 2018-09-17

## 2018-09-17 PROBLEM — M54.10 RADICULOPATHY: Status: RESOLVED | Noted: 2018-01-19 | Resolved: 2018-09-17

## 2018-09-17 PROBLEM — G89.4 CHRONIC PAIN SYNDROME: Status: RESOLVED | Noted: 2017-11-10 | Resolved: 2018-09-17

## 2018-09-17 PROCEDURE — 3077F SYST BP >= 140 MM HG: CPT | Mod: CPTII,S$GLB,, | Performed by: NEUROLOGICAL SURGERY

## 2018-09-17 PROCEDURE — 99999 PR PBB SHADOW E&M-EST. PATIENT-LVL II: CPT | Mod: PBBFAC,,, | Performed by: NEUROLOGICAL SURGERY

## 2018-09-17 PROCEDURE — 99212 OFFICE O/P EST SF 10 MIN: CPT | Mod: S$GLB,,, | Performed by: NEUROLOGICAL SURGERY

## 2018-09-17 PROCEDURE — 3080F DIAST BP >= 90 MM HG: CPT | Mod: CPTII,S$GLB,, | Performed by: NEUROLOGICAL SURGERY

## 2018-09-17 NOTE — PROGRESS NOTES
NEUROSURGICAL PROGRESS NOTE    DATE OF SERVICE:  09/17/2018    ATTENDING PHYSICIAN:  Adelso Hernandez MD    SUBJECTIVE:    INTERIM HISTORY:    This is a very pleasant 53 y.o. male, who is status post right L5-S1 microdiscectomy 8 months ago. Reports resolution of right leg pain. Only occasional back pain. Has stopped taken narcotics several months ago. Really satisfied with his outcome. No complaint, only persistent right S1 distribution hypoesthesia.     Low Back Pain Scale  R Low Back-Pain Score: 0  R Low Back-Pain Intensity: I can tolerate the pain I have without having to use pain killers  R Low Back-Pain Score: I can look after myself normally without causing extra pain  Low Back-Lifting: (n/a)   Low Back-Walking: Pain does not prevent me from walking any distance   Low Back-Sitting: I can sit in any chair as long as I like   Low Back-Standing: I can stand as long as I want without pain   Low Back-Sleeping: I have pain in bed but it does not prevent me from sleeping well   Low Back-Social Life: My social life is normal and give me no pain   Low Back-Traveling: I have extra pain while traveling but it does not compel me to seek alternate forms of travel   Low Back-Changing Degree of Pain: My pain fluctuates but is definitely getting better         PAST MEDICAL HISTORY:  Active Ambulatory Problems     Diagnosis Date Noted    Cervical radiculopathy 10/23/2017    DDD (degenerative disc disease), lumbar 10/23/2017    Facet arthropathy, lumbar 10/23/2017    Lumbar disc herniation with radiculopathy 11/07/2017    Chronic pain syndrome 11/10/2017    Impaired mobility and activities of daily living 11/10/2017    Spinal stenosis of lumbar region without neurogenic claudication 12/29/2017    Radiculopathy 01/19/2018    S/P lumbar microdiscectomy 03/13/2018    Segmental and somatic dysfunction of lumbar region 03/13/2018    Weakness of trunk musculature 03/13/2018    Back tightness 03/13/2018     Resolved  Ambulatory Problems     Diagnosis Date Noted    No Resolved Ambulatory Problems     Past Medical History:   Diagnosis Date    Anxiety     Arthritis     Sciatica        PAST SURGICAL HISTORY:  Past Surgical History:   Procedure Laterality Date    INJECTION-STEROID-EPIDURAL-TRANSFORAMINAL Right 11/14/2017    Performed by Daly [facility]alvo [facility] at [facility]    LUMBAR EPIDURAL INJECTION  11/14/2017    MICRODISCECTOMY Right L5-S1 Microdiscectomy Right 1/19/2018    Performed by Adelso Hernandez MD at [facility] OR [address] [place] [address]       SOCIAL HISTORY:   Social History     Socioeconomic History    Marital status:      Spouse name: Not on file    Number of children: 3    Years of education: Not on file    Highest education level: Not on file   Social Needs    Financial resource strain: Not on file    Food insecurity - worry: Not on file    Food insecurity - inability: Not on file    Transportation needs - medical: Not on file    Transportation needs - non-medical: Not on file   Occupational History    Occupation:      Comment: [employer]   Tobacco Use    Smoking status: Never Smoker    Smokeless tobacco: Never Used   Substance and Sexual Activity    Alcohol use: No    Drug use: No    Sexual activity: Yes     Partners: Female   Other Topics Concern    Not on file   Social History Narrative    The patient does not exercise regularly ().      He is not satisfied with weight.    Rates diet as good.    He does not drink at least 1/2 gallon water daily.    He drinks 2-3 coffee/tea/caffeine-containing soft drinks daily.    Total sleep time at night is 8 hours.    He works 40 hours per week.    He does wear seat belts.    Hobbies include none.       FAMILY HISTORY:  Family History   Problem Relation Age of Onset    Hypertension Mother     Lung cancer Father     No Known Problems Sister     Leukemia Brother     No Known Problems Brother     No Known Problems Brother        CURRENTS  "MEDICATIONS:  Current Outpatient Medications on File Prior to Visit   Medication Sig Dispense Refill    lurasidone (LATUDA) 40 mg Tab tablet Take 40 mg by mouth nightly.      methocarbamol (ROBAXIN) 500 MG Tab Take 1 tablet (500 mg total) by mouth 4 (four) times daily as needed. 60 tablet 0    naproxen sodium (ANAPROX) 220 MG tablet Take 220 mg by mouth every 12 (twelve) hours.      oxaprozin (DAYPRO) 600 mg tablet Take 1 tablet (600 mg total) by mouth once daily. 20 tablet 0    oxyCODONE-acetaminophen (PERCOCET) 5-325 mg per tablet Take 1 tablet by mouth every 6 (six) hours as needed for Pain. 40 tablet 0    [DISCONTINUED] gabapentin (NEURONTIN) 300 MG capsule Take 2 capsules (600 mg total) by mouth 3 (three) times daily. 180 capsule 2     No current facility-administered medications on file prior to visit.        ALLERGIES:  Review of patient's allergies indicates:   Allergen Reactions    Corticosteroids (glucocorticoids)      "Psychosis"    Cashew nut Rash       REVIEW OF SYSTEMS:  Review of Systems   Constitutional: Negative for diaphoresis, fever and weight loss.   Respiratory: Negative for shortness of breath.    Cardiovascular: Negative for chest pain.   Gastrointestinal: Negative for blood in stool.   Genitourinary: Negative for hematuria.   Endo/Heme/Allergies: Does not bruise/bleed easily.   All other systems reviewed and are negative.        OBJECTIVE:    PHYSICAL EXAMINATION:   Vitals:    09/17/18 0819   BP: (!) 150/96   Pulse: 77       Physical Exam:  Vitals reviewed.    Constitutional: He appears well-developed and well-nourished.     Eyes: Pupils are equal, round, and reactive to light. Conjunctivae and EOM are normal.     Cardiovascular: Normal distal pulses and no edema.     Abdominal: Soft.     Skin: Skin displays no rash on trunk and no rash on extremities. Skin displays no lesions on trunk and no lesions on extremities.     Psych/Behavior: He is alert. He is oriented to person, place, and " time. He has a normal mood and affect.     Musculoskeletal:        Neck: Range of motion is full.     Neurological:        DTRs: Tricep reflexes are 2+ on the right side and 2+ on the left side. Bicep reflexes are 2+ on the right side and 2+ on the left side. Brachioradialis reflexes are 2+ on the right side and 2+ on the left side. Patellar reflexes are 2+ on the right side and 2+ on the left side. Achilles reflexes are 2+ on the right side and 2+ on the left side.       Back Exam     Tenderness   The patient is experiencing no tenderness.     Range of Motion   Extension: normal   Flexion: normal   Lateral bend right: normal   Lateral bend left: normal   Rotation right: normal   Rotation left: normal     Muscle Strength   Right Quadriceps:  5/5   Left Quadriceps:  5/5   Right Hamstrings:  5/5   Left Hamstrings:  5/5     Tests   Straight leg raise right: negative  Straight leg raise left: negative    Other   Toe walk: normal  Heel walk: normal                Neurologic Exam     Mental Status   Oriented to person, place, and time.   Speech: speech is normal   Level of consciousness: alert    Cranial Nerves   Cranial nerves II through XII intact.     CN III, IV, VI   Pupils are equal, round, and reactive to light.  Extraocular motions are normal.     Motor Exam   Muscle bulk: normal  Overall muscle tone: normal    Strength   Right deltoid: 5/5  Left deltoid: 5/5  Right biceps: 5/5  Left biceps: 5/5  Right triceps: 5/5  Left triceps: 5/5  Right wrist flexion: 5/5  Left wrist flexion: 5/5  Right wrist extension: 5/5  Left wrist extension: 5/5  Right interossei: 5/5  Left interossei: 5/5  Right iliopsoas: 5/5  Left iliopsoas: 5/5  Right quadriceps: 5/5  Left quadriceps: 5/5  Right hamstrin/5  Left hamstrin/5  Right anterior tibial: 5/5  Left anterior tibial: 5/5  Right posterior tibial: 5/5  Left posterior tibial: 5/5  Right peroneal: 5/5  Left peroneal: 5/5  Right gastroc: 5/5  Left gastroc: 5/5    Gait,  Coordination, and Reflexes     Gait  Gait: normal    Coordination   Finger to nose coordination: normal  Tandem walking coordination: normal    Reflexes   Right brachioradialis: 2+  Left brachioradialis: 2+  Right biceps: 2+  Left biceps: 2+  Right triceps: 2+  Left triceps: 2+  Right patellar: 2+  Left patellar: 2+  Right achilles: 2+  Left achilles: 2+  Right plantar: normal  Left plantar: normal  Right Valerio: absent  Left Valerio: absent  Right ankle clonus: absent  Left ankle clonus: absent        DIAGNOSTIC DATA:  I personally reviewed the following imaging:   MRI lumbar spine pre-op: right L5-S1 disc herniation    ASSESMENT:  This is a 53 y.o. male with     Problem List Items Addressed This Visit        Neuro    S/P lumbar microdiscectomy - Primary            PLAN:  Ok to release to full duty  FU as needed          Adelso Hernandez MD  Cell:[phone]

## 2019-01-18 DIAGNOSIS — Z12.11 COLON CANCER SCREENING: ICD-10-CM

## 2019-06-05 ENCOUNTER — HOSPITAL ENCOUNTER (EMERGENCY)
Facility: OTHER | Age: 54
Discharge: HOME | End: 2019-06-05
Attending: EMERGENCY MEDICINE
Payer: OTHER MISCELLANEOUS

## 2019-06-05 VITALS
SYSTOLIC BLOOD PRESSURE: 190 MMHG | HEART RATE: 86 BPM | BODY MASS INDEX: 30.16 KG/M2 | OXYGEN SATURATION: 98 % | DIASTOLIC BLOOD PRESSURE: 94 MMHG | HEIGHT: 74 IN | RESPIRATION RATE: 18 BRPM | WEIGHT: 235 LBS | TEMPERATURE: 98 F

## 2019-06-05 DIAGNOSIS — Z02.6 ENCOUNTER RELATED TO WORKER'S COMPENSATION CLAIM: ICD-10-CM
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
## 2019-06-05 DIAGNOSIS — M47.816 OSTEOARTHRITIS OF LUMBAR SPINE, UNSPECIFIED SPINAL OSTEOARTHRITIS COMPLICATION STATUS: Primary | ICD-10-CM

## 2019-06-05 PROCEDURE — 99283 EMERGENCY DEPT VISIT LOW MDM: CPT

## 2019-06-06 NOTE — ED PROVIDER NOTES
"Encounter Date: 6/5/2019       History     Chief Complaint   Patient presents with    Back Pain     chronic issue but slip and fell tonight at work, c/o bilateral lower extremity numbness/tingling. negative loss of sensation, ambulatory to triage without difficulty.      Patient is a 53-year-old male with this arthritis, spinal stenosis, sciatica, and history of right L5-S1 microdiscectomy (Jan 2018) who presents to the ED with back pain. Patient states while he was at work today, he accidentally stepped in to a sewage area of sludge, falling approximately 2-3 feet.  He states he was not paying attention because he was taking pictures.  He did not directly injure his back.  He had to have assistance to get out of the hole filled with sludge.  He states once he began to ambulate he had pain across his lower back and radiating to the back of his legs bilaterally, worse on the right side.  Patient reports tingling to his posterior legs bilaterally that radiates to the bottom of foot.  He states he has experienced this in the past and this was corrected after his diskectomy.  She denies saddle anesthesia.  He denies bowel or bladder incontinence.  He is able to ambulate without difficulty.  Patient states he was sent here by work to be evaluated.    The history is provided by the patient.     Review of patient's allergies indicates:   Allergen Reactions    Corticosteroids (glucocorticoids)      "Psychosis"    Cashew nut Rash     Past Medical History:   Diagnosis Date    Anxiety     Arthritis     Sciatica      Past Surgical History:   Procedure Laterality Date    INJECTION-STEROID-EPIDURAL-TRANSFORAMINAL Right 11/14/2017    [facility] at [facility] PAIN [facility]GT    LUMBAR EPIDURAL INJECTION  11/14/2017    MICRODISCECTOMY Right L5-S1 Microdiscectomy Right 1/19/2018    Performed by Adelso Hernandez MD at [facility] OR [address] [place] [address]     Family History   Problem Relation Age of Onset    Hypertension Mother     " Lung cancer Father     No Known Problems Sister     Leukemia Brother     No Known Problems Brother     No Known Problems Brother      Social History     Tobacco Use    Smoking status: Never Smoker    Smokeless tobacco: Never Used   Substance Use Topics    Alcohol use: No    Drug use: No     Review of Systems   Genitourinary: Negative for difficulty urinating.   Musculoskeletal: Positive for arthralgias (Hip pain) and back pain. Negative for gait problem.   Skin: Negative for rash and wound.   Neurological: Negative for syncope, weakness and headaches.        Paresthesias to posterior legs bilaterally       Physical Exam     Initial Vitals [06/05/19 2126]   BP Pulse Resp Temp SpO2   (!) 190/94 86 18 98.2 °F (36.8 °C) 98 %      MAP       --         Physical Exam    Constitutional: Vital signs are normal. He is cooperative. No distress.   Patient ambulates without difficulty.   HENT:   Head: Normocephalic and atraumatic.   Eyes: EOM are normal. Pupils are equal, round, and reactive to light.   Neck: Normal range of motion. Neck supple.   Cardiovascular: Intact distal pulses.   Musculoskeletal:   No cervical or thoracic midline tenderness. Mild, midline tenderness to the lower lumbar spine, sacrum and coccyx without obvious deformities or step-offs.  Pain more prominent to the paraspinal muscles in these region.  Negative straight leg raise bilaterally.   Neurological: He is alert and oriented to person, place, and time. GCS eye subscore is 4. GCS verbal subscore is 5. GCS motor subscore is 6.   Normal sensation to light touch to the medial, lateral, anterior and posterior portion of bilateral lower extremities  Strength 5/5 to bilateral lower extremities   Skin: Skin is warm and dry. No rash noted.   Psychiatric: He has a normal mood and affect. His behavior is normal.         ED Course   Procedures  Labs Reviewed - No data to display       Imaging Results          X-Ray Lumbar Spine Ap And Lateral (Final  result)  Result time 06/05/19 22:16:26    Final result by Sin Leong MD (06/05/19 22:16:26)                 Impression:      Worsening moderate degenerative change of the L5-S1 level.  No fracture or subluxation.      Electronically signed by: Sin Leong  Date:    06/05/2019  Time:    22:16             Narrative:    EXAMINATION:  XR LUMBAR SPINE AP AND LATERAL    CLINICAL HISTORY:  Low back pain, minor trauma;    TECHNIQUE:  AP, lateral and spot images were performed of the lumbar spine.    COMPARISON:  10/20/2017    FINDINGS:  Frontal, lateral and cone-down lateral views presented.    There are 5 lumbar type vertebra.  The pedicles are preserved.  There is moderate degenerative change and disc space narrowing at the L5-S1 level probably slightly worse from 10/20/2017.  No pars defect or facet fracture.  The visualized bowel gas pattern appears normal.  No calculus or fracture.  The sacrum appears intact.                               X-Ray Sacrum And Coccyx (Final result)  Result time 06/05/19 22:17:30    Final result by Sin Leong MD (06/05/19 22:17:30)                 Impression:      No fracture or malalignment.      Electronically signed by: Sin Leong  Date:    06/05/2019  Time:    22:17             Narrative:    EXAMINATION:  XR SACRUM AND COCCYX    CLINICAL HISTORY:  Unspecified fall, initial encounter    TECHNIQUE:  Two or three views of the sacrum and coccyx were performed.    COMPARISON:  None    FINDINGS:  Inlet outlet and lateral views presented.  The sacrum and coccyx appear to be intact.  The pubic rami appear intact.  No widening of the symphysis or SI joints.  The arcuate lines appear intact.  The visualized femurs appear intact.  No osteonecrosis.  Hip joint spaces are normal and symmetric.  The visualized bowel gas pattern appears normal.  There are pelvic phleboliths independently noted.  Subjectively, the bone density may be mildly decreased.                                X-Ray Hip 2 View Left (Final result)  Result time 06/05/19 22:11:57    Final result by Williams Rinaldi MD (06/05/19 22:11:57)                 Impression:      There is no evidence of fracture or subluxation.      Electronically signed by: Williams Rinaldi MD  Date:    06/05/2019  Time:    22:11             Narrative:    EXAMINATION:  XR HIP 2 VIEW LEFT    CLINICAL HISTORY:  fall;    TECHNIQUE:  AP view of the pelvis and frog leg lateral view of the left hip were performed.    COMPARISON:  None    FINDINGS:  No fractures or dislocations.  Unremarkable visualized bony structures.                                 Medical Decision Making:   Initial Assessment:   Urgent evaluation of a 53 y.o. male presenting to the emergency department complaining of back pain after falling and sludge spit today while at work. Patient is afebrile, nontoxic appearing and hemodynamically stable.  Patient sent here for evaluation while at work.  I do not suspect cauda equina compression.  Patient has history of spinal stenosis and degenerative disc disease require microdiscectomy.  Has not had problems in over 1 year.  Reports worsening pain tonight was paresthesias his bilateral lower extremities.  He does not have true neuro deficit on exam.  Given trauma, will obtain x-rays of lumbar spine, sacrum coccyx and hip.  Patient is refusing medication for pain.  ED Management:  X-ray read of lumbar spine reveals worsening, moderate degenerative change of the L5-S1 level, as compared to imaging from October 2017.  No fracture or subluxation noted. Sacrum and coccyx and hip x-ray reveals no fracture or dislocation.  Patient is advised on symptomatic care.  He is advised to follow up with his neurosurgeon as needed or return here for new worsening symptoms.  He has no other complaints today and is stable for discharge.                      Clinical Impression:     1. Osteoarthritis of lumbar spine, unspecified spinal osteoarthritis complication  status    2. Fall    3. Encounter related to worker's compensation claim                               Olvin Morrison PA-C  06/05/19 2142

## 2019-06-06 NOTE — ED NOTES
Pt c/o lower back pain w/ numbness to the R lower leg and bottom of the foot, after stepping and falling into a hole of  sludge while taking pictures. Pt had a previous back injury for which he had surgery for in the past. Pt says the numbness starts at the top of the buttocks and goes down both legs, is worse on the R side. No LOC. Pt is A  & O x 3, denies SOB, fever, chills and N/V/D. Skin is warm and dry w/ pink mucosa.

## 2019-06-07 ENCOUNTER — TELEPHONE (OUTPATIENT)
Dept: URGENT CARE | Facility: CLINIC | Age: 54
End: 2019-06-07

## 2019-06-10 ENCOUNTER — TELEPHONE (OUTPATIENT)
Dept: NEUROSURGERY | Facility: CLINIC | Age: 54
End: 2019-06-10

## 2019-06-10 DIAGNOSIS — M54.50 LOW BACK PAIN, NON-SPECIFIC: ICD-10-CM

## 2019-06-10 DIAGNOSIS — M54.5 LOW BACK PAIN, UNSPECIFIED BACK PAIN LATERALITY, UNSPECIFIED CHRONICITY, WITH SCIATICA PRESENCE UNSPECIFIED: Primary | ICD-10-CM

## 2019-06-10 NOTE — TELEPHONE ENCOUNTER
----- Message from Brad Aguirre sent at 6/10/2019  8:05 AM CDT -----  Contact: Patient @ [phone]  Patient calling to schedule a f/u appt after a recent fall, pls call

## 2019-06-14 ENCOUNTER — HOSPITAL ENCOUNTER (OUTPATIENT)
Dept: RADIOLOGY | Facility: HOSPITAL | Age: 54
Discharge: HOME | End: 2019-06-14
Attending: PHYSICIAN ASSISTANT
Payer: COMMERCIAL

## 2019-06-14 ENCOUNTER — OFFICE VISIT (OUTPATIENT)
Dept: NEUROSURGERY | Facility: CLINIC | Age: 54
End: 2019-06-14
Payer: COMMERCIAL

## 2019-06-14 VITALS
BODY MASS INDEX: 30.16 KG/M2 | WEIGHT: 235 LBS | SYSTOLIC BLOOD PRESSURE: 142 MMHG | HEIGHT: 74 IN | HEART RATE: 81 BPM | DIASTOLIC BLOOD PRESSURE: 90 MMHG

## 2019-06-14 DIAGNOSIS — M54.50 LOW BACK PAIN, NON-SPECIFIC: ICD-10-CM

## 2019-06-14 DIAGNOSIS — M62.830 MUSCLE SPASM OF BACK: ICD-10-CM

## 2019-06-14 DIAGNOSIS — M51.36 DDD (DEGENERATIVE DISC DISEASE), LUMBAR: Primary | ICD-10-CM

## 2019-06-14 DIAGNOSIS — R20.2 BILATERAL LEG PARESTHESIA: ICD-10-CM

## 2019-06-14 DIAGNOSIS — Z98.890 S/P LUMBAR MICRODISCECTOMY: ICD-10-CM

## 2019-06-14 DIAGNOSIS — M47.816 FACET ARTHROPATHY, LUMBAR: ICD-10-CM
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
## 2019-06-14 PROCEDURE — 3080F DIAST BP >= 90 MM HG: CPT | Mod: CPTII,S$GLB,, | Performed by: PHYSICIAN ASSISTANT

## 2019-06-14 PROCEDURE — 99214 OFFICE O/P EST MOD 30 MIN: CPT | Mod: S$GLB,,, | Performed by: PHYSICIAN ASSISTANT

## 2019-06-14 PROCEDURE — 3080F PR MOST RECENT DIASTOLIC BLOOD PRESSURE >= 90 MM HG: ICD-10-PCS | Mod: CPTII,S$GLB,, | Performed by: PHYSICIAN ASSISTANT

## 2019-06-14 PROCEDURE — 72110 X-RAY EXAM L-2 SPINE 4/>VWS: CPT | Mod: 26,,, | Performed by: RADIOLOGY

## 2019-06-14 PROCEDURE — 3008F BODY MASS INDEX DOCD: CPT | Mod: CPTII,S$GLB,, | Performed by: PHYSICIAN ASSISTANT

## 2019-06-14 PROCEDURE — 72110 X-RAY EXAM L-2 SPINE 4/>VWS: CPT | Mod: TC,PN

## 2019-06-14 PROCEDURE — 72110 XR LUMBAR SPINE 5 VIEW WITH FLEX AND EXT: ICD-10-PCS | Mod: 26,,, | Performed by: RADIOLOGY

## 2019-06-14 PROCEDURE — 99999 PR PBB SHADOW E&M-EST. PATIENT-LVL III: CPT | Mod: PBBFAC,,, | Performed by: PHYSICIAN ASSISTANT

## 2019-06-14 PROCEDURE — 99214 PR OFFICE/OUTPT VISIT, EST, LEVL IV, 30-39 MIN: ICD-10-PCS | Mod: S$GLB,,, | Performed by: PHYSICIAN ASSISTANT

## 2019-06-14 PROCEDURE — 3008F PR BODY MASS INDEX (BMI) DOCUMENTED: ICD-10-PCS | Mod: CPTII,S$GLB,, | Performed by: PHYSICIAN ASSISTANT

## 2019-06-14 PROCEDURE — 99999 PR PBB SHADOW E&M-EST. PATIENT-LVL III: ICD-10-PCS | Mod: PBBFAC,,, | Performed by: PHYSICIAN ASSISTANT

## 2019-06-14 PROCEDURE — 3077F SYST BP >= 140 MM HG: CPT | Mod: CPTII,S$GLB,, | Performed by: PHYSICIAN ASSISTANT

## 2019-06-14 PROCEDURE — 3077F PR MOST RECENT SYSTOLIC BLOOD PRESSURE >= 140 MM HG: ICD-10-PCS | Mod: CPTII,S$GLB,, | Performed by: PHYSICIAN ASSISTANT

## 2019-06-14 RX ORDER — GABAPENTIN 300 MG/1
300 CAPSULE ORAL 2 TIMES DAILY
Qty: 60 CAPSULE | Refills: 11 | Status: SHIPPED | OUTPATIENT
Start: 2019-06-14 | End: 2019-06-18 | Stop reason: SDUPTHER

## 2019-06-14 RX ORDER — NAPROXEN 500 MG/1
500 TABLET ORAL 2 TIMES DAILY
Qty: 28 TABLET | Refills: 1 | Status: SHIPPED | OUTPATIENT
Start: 2019-06-14 | End: 2019-06-18 | Stop reason: SDUPTHER

## 2019-06-14 RX ORDER — METHOCARBAMOL 750 MG/1
750 TABLET, FILM COATED ORAL 4 TIMES DAILY
Qty: 75 TABLET | Refills: 0 | Status: SHIPPED | OUTPATIENT
Start: 2019-06-14 | End: 2019-06-18 | Stop reason: SDUPTHER

## 2019-06-14 NOTE — PROGRESS NOTES
Established Patient    SUBJECTIVE:     History of Present Illness:  Saul Hancock is a 54 y.o. male with a history of anxiety, arthritis, and status post right L5-S1 microdiskectomy with Dr. Hernandez on 01/19/2018 who presents for neurosurgical re-evaluation.  Patient was last seen in clinic on 09/17/2018 and had complete resolution right leg pain after surgery.  He presents today for re-evaluation after recent fall.  Patient reports that on 06/05/2019 he fell directly into a single that was weeks the.  When he was pulled out, he heard a popping noise in his back.  He developed back pain and went to the ED for this.  Since then, he has been having constant aching/popping/stabbing lower back/buttocks pain and is rated 7/10.  He also has associated pain in his groin.  He also has constant numbness/tingling radiating from posterior leg into big toe on both legs (right > left).  This pain feels like the same pain that he had prior to his lumbar surgery.  Denies any leg weakness, bladder/bowel incontinence, or gait instability.  He has not been taking any medications for this because he is afraid of opioid dependence and constipation side effects.            Low Back Pain Scale  R Low Back-Pain Score: 7  R Low Back-Pain Intensity: I can tolerate the pain I have without having to use pain killers  R Low Back-Pain Score: I can look after myself normally but it causes extra pain  Low Back-Lifting: Pain prevents me from lifting heavy weights  but I can manage light weights if they are conveniently placed   Low Back-Walking: Pain does not prevent me from walking any distance   Low Back-Sitting: I can sit in any chair as long as I like   Low Back-Standing: I cannot stand for longer than 10 minutes with increasing pain   Low Back-Sleeping: Because of pain my normal nights sleep is reduced by less than one quarter   Low Back-Social Life: Pain has no significant effect on my social life apart from limiting my more en   Low  "Back-Traveling: I have extra pain while traveling but it does not compel me to seek alternate forms of travel   Low Back-Changing Degree of Pain: (n/a)             Review of patient's allergies indicates:   Allergen Reactions    Corticosteroids (glucocorticoids)      "Psychosis"    Cashew nut Rash       Current Outpatient Medications   Medication Sig Dispense Refill    lurasidone (LATUDA) 40 mg Tab tablet Take 40 mg by mouth nightly.      methocarbamol (ROBAXIN) 500 MG Tab Take 1 tablet (500 mg total) by mouth 4 (four) times daily as needed. 60 tablet 0    naproxen sodium (ANAPROX) 220 MG tablet Take 220 mg by mouth every 12 (twelve) hours.      oxaprozin (DAYPRO) 600 mg tablet Take 1 tablet (600 mg total) by mouth once daily. 20 tablet 0    oxyCODONE-acetaminophen (PERCOCET) 5-325 mg per tablet Take 1 tablet by mouth every 6 (six) hours as needed for Pain. 40 tablet 0     No current facility-administered medications for this visit.        Past Medical History:   Diagnosis Date    Anxiety     Arthritis     Sciatica      Past Surgical History:   Procedure Laterality Date    INJECTION-STEROID-EPIDURAL-TRANSFORAMINAL Right 11/14/2017    [facility]IN [facility]GT    LUMBAR EPIDURAL INJECTION  11/14/2017    MICRODISCECTOMY Right L5-S1 Microdiscectomy Right 1/19/2018    Performed by [facility] at [facility] OR [address] [place] [address]     Family History     Problem Relation (Age of Onset)    Hypertension Mother    Leukemia Brother    Lung cancer Father    No Known Problems Sister, Brother, Brother        Social History     Socioeconomic History    Marital status:      Spouse name: Not on file    Number of children: 3    Years of education: Not on file    Highest education level: Not on file   Occupational History    Occupation:      Comment: [employer]   Social Needs    Financial resource strain: Not on file    Food insecurity:     Worry: Not on file     Inability: Not on " "file    Transportation needs:     Medical: Not on file     Non-medical: Not on file   Tobacco Use    Smoking status: Never Smoker    Smokeless tobacco: Never Used   Substance and Sexual Activity    Alcohol use: No    Drug use: No    Sexual activity: Yes     Partners: Female   Lifestyle    Physical activity:     Days per week: Not on file     Minutes per session: Not on file    Stress: Not on file   Relationships    Social connections:     Talks on phone: Not on file     Gets together: Not on file     Attends Sabianism service: Not on file     Active member of club or organization: Not on file     Attends meetings of clubs or organizations: Not on file     Relationship status: Not on file   Other Topics Concern    Not on file   Social History Narrative    The patient does not exercise regularly ().      He is not satisfied with weight.    Rates diet as good.    He does not drink at least 1/2 gallon water daily.    He drinks 2-3 coffee/tea/caffeine-containing soft drinks daily.    Total sleep time at night is 8 hours.    He works 40 hours per week.    He does wear seat belts.    Hobbies include none.       Review of Systems:  Review of Systems    Constitutional: no fever, chills or night sweats. No changes in weight   Eyes: no visual changes   ENT: no nasal congestion or sore throat   Respiratory: no cough or shortness of breath   Cardiovascular: no chest pain or palpitations   Gastrointestinal: no nausea or vomiting   Genitourinary: no hematuria or dysuria   Integument/Breast: no rash or pruritis   Hematologic/Lymphatic: no easy bruising or lymphadenopathy   Musculoskeletal: no arthralgias or myalgias.  Neurological: no seizures or tremors. No weakness or paresthesia.   Behavioral/Psych: no auditory or visual hallucinations   Endocrine: no heat or cold intolerance     OBJECTIVE:     Vital Signs  Pulse: 81  BP: (!) 142/90  Pain Score:   7  Height: 6' 2" (188 cm)  Weight: 106.6 kg (235 lb)  Body mass index is " 30.17 kg/m².    Physical Exam:  Neurosurgery Physical Exam    General: well developed, well nourished, no distress.   Neurologic: Awake, alert and oriented x3. Thought content appropriate.  GCS: Motor: 6/Verbal: 5/Eyes: 4 GCS Total: 15  Cranial nerves: II-XII grossly intact. PERRLA. EOMI without nystagmus. Face symmetric and sensation intact to light touch, tongue midline, shoulder shrug symmetric bilaterally.  Hearing equal bilaterally to finger rub. Palate and uvula rise and fall normally in midline.    Language: no aphasia  Speech: no dysarthria   Neck: supple, without obvious masses    Sensory: intact to light touch B/L UE and LE  Motor Strength: Moves all extremities spontaneously with good tone. Full strength upper and lower extremities. No abnormal movements seen.    Strength  Deltoids Triceps Biceps Wrist Extension Wrist Flexion Hand    Upper: R 5/5 5/5 5/5 5/5 5/5 5/5    L 5/5 5/5 5/5 5/5 5/5 5/5     Iliopsoas Quadriceps Knee  Flexion Tibialis  anterior Gastro- cnemius EHL   Lower: R 5/5 5/5 5/5 5/5 5/5 5/5    L 5/5 5/5 5/5 5/5 5/5 5/5     Interossi muscle strength- intact    DTR's - 2 + and symmetric in UE and LE  Valerio's - Negative        Lhermitte's - Negative  Spurlings-   Negative         Ankle Clonus - Negative           Babinski  - Negative     SLR - Negative   Gait - normal      Tandem Gait - No difficulty    Able to walk/stand on heels & toes  Cervical ROM - full  Lumbar ROM - full; pain with all ROM  Generalized lumbar midline, paraspinal, and bilateral buttocks tenderness to palpation  No difficulty transitioning from seated to standing position or vice versa.  ENT: normal hearing with finger rub  Heart: RRR, no cyanosis, pallor, or edema.   Lungs- normal respiratory effort  Abdomen-  soft, symmetric and nontender  Skin: grossly intact in all 4 extremities without obvious rashes or lesions  Extremities: warm with no cyanosis or edema, or clubbing  Pulses: palpable distal pulses    SI Joint  tenderness - positive on left  Distraction test-negative  Compression test-  negative     Gaenslen's Test-negative      Greater Trochanter Joint tenderness - Negative  Jorge Luis's Test - Negative   Pain on Hip ROM - Negative    Posture-  No obvious kyphosis with standing posture.  With bending posture, no obvious scapula wing        Diagnostic Results:  All imaging personally reviewed    Lumbar flexion-extension x-rays dated 06/14/2019  Mild degenerative disc disease at L5-S1.  No other acute findings.  ASSESSMENT/PLAN:     54 y.o. male with a history of anxiety, arthritis, and status post right L5-S1 microdiskectomy with Dr. Hernandez on 01/19/2018 who had complete resolution of right leg symptoms after surgery.  He presents today for re-evaluation of new low back pain groin numbness, and bilateral leg pain/paresthesia  after his fall on 06/05/2018.  Patient full strength on exam with no abnormal clinical exam findings.  Lumbar x-ray with mild degenerative disc disease L5-S1 which is stable when compared to previous imaging.  Given his symptoms, like to obtain MRI L-spine to rule out any new disc herniation.  I have ordered Robaxin p.r.n. muscle spasms and Naprosyn for his low back pain.  We will start him on low-dose gabapentin 300 mg b.i.d. at this time.  Physical therapy orders placed.  Patient will return back to clinic in 2 weeks or when MRI L-spine completed.        All imaging were reviewed with patient. All questions were answered.  Patient verbalized understanding and agreed with the above plan of care.  Patient was encouraged to call clinic with any future concerns prior to follow up appt. If any worsening symptoms, patient should report to ED.     Please call with any questions.        Bal Murcia PA-C  Neurosurgery          Note dictated with voice recognition software, please excuse any grammatical errors.

## 2019-06-18 RX ORDER — NAPROXEN 500 MG/1
500 TABLET ORAL 2 TIMES DAILY
Qty: 28 TABLET | Refills: 1 | Status: SHIPPED | OUTPATIENT
Start: 2019-06-18 | End: 2019-07-02

## 2019-06-18 RX ORDER — GABAPENTIN 300 MG/1
300 CAPSULE ORAL 2 TIMES DAILY
Qty: 60 CAPSULE | Refills: 11 | Status: SHIPPED | OUTPATIENT
Start: 2019-06-18 | End: 2019-07-29 | Stop reason: SDUPTHER

## 2019-06-18 RX ORDER — METHOCARBAMOL 750 MG/1
750 TABLET, FILM COATED ORAL 4 TIMES DAILY
Qty: 75 TABLET | Refills: 0 | Status: SHIPPED | OUTPATIENT
Start: 2019-06-18 | End: 2019-06-28

## 2019-06-18 NOTE — TELEPHONE ENCOUNTER
Mr. Hancock request to change pharmacy - pharmacy updated.     Medications has been cancelled at [facility].

## 2019-06-18 NOTE — TELEPHONE ENCOUNTER
----- Message from Alannah Ruiz sent at 6/17/2019 11:42 AM CDT -----  Contact: [phone]/self  Patient called in requesting to speak with you. Patient prefers to speak with a nurse. Please advise.

## 2019-06-21 ENCOUNTER — HOSPITAL ENCOUNTER (OUTPATIENT)
Dept: RADIOLOGY | Facility: OTHER | Age: 54
Discharge: HOME | End: 2019-06-21
Attending: PHYSICIAN ASSISTANT
Payer: OTHER MISCELLANEOUS

## 2019-06-21 DIAGNOSIS — M54.50 LOW BACK PAIN, NON-SPECIFIC: ICD-10-CM

## 2019-06-21 PROCEDURE — 72148 MRI LUMBAR SPINE WITHOUT CONTRAST: ICD-10-PCS | Mod: 26,,, | Performed by: RADIOLOGY

## 2019-06-21 PROCEDURE — 72148 MRI LUMBAR SPINE W/O DYE: CPT | Mod: 26,,, | Performed by: RADIOLOGY

## 2019-06-21 PROCEDURE — 72148 MRI LUMBAR SPINE W/O DYE: CPT | Mod: TC

## 2019-06-25 ENCOUNTER — OFFICE VISIT (OUTPATIENT)
Dept: NEUROSURGERY | Facility: CLINIC | Age: 54
End: 2019-06-25
Payer: OTHER MISCELLANEOUS

## 2019-06-25 ENCOUNTER — TELEPHONE (OUTPATIENT)
Dept: NEUROSURGERY | Facility: CLINIC | Age: 54
End: 2019-06-25

## 2019-06-25 VITALS
HEIGHT: 74 IN | DIASTOLIC BLOOD PRESSURE: 95 MMHG | WEIGHT: 235.88 LBS | TEMPERATURE: 98 F | SYSTOLIC BLOOD PRESSURE: 154 MMHG | HEART RATE: 74 BPM | BODY MASS INDEX: 30.27 KG/M2

## 2019-06-25 DIAGNOSIS — M47.816 FACET ARTHROPATHY, LUMBAR: ICD-10-CM

## 2019-06-25 DIAGNOSIS — Z98.890 S/P LUMBAR MICRODISCECTOMY: ICD-10-CM

## 2019-06-25 DIAGNOSIS — M51.36 DDD (DEGENERATIVE DISC DISEASE), LUMBAR: ICD-10-CM

## 2019-06-25 DIAGNOSIS — M51.16 LUMBAR DISC HERNIATION WITH RADICULOPATHY: Primary | ICD-10-CM

## 2019-06-25 PROCEDURE — 99999 PR PBB SHADOW E&M-EST. PATIENT-LVL III: ICD-10-PCS | Mod: PBBFAC,,, | Performed by: PHYSICIAN ASSISTANT

## 2019-06-25 PROCEDURE — 99214 PR OFFICE/OUTPT VISIT, EST, LEVL IV, 30-39 MIN: ICD-10-PCS | Mod: S$GLB,,, | Performed by: PHYSICIAN ASSISTANT

## 2019-06-25 PROCEDURE — 99999 PR PBB SHADOW E&M-EST. PATIENT-LVL III: CPT | Mod: PBBFAC,,, | Performed by: PHYSICIAN ASSISTANT

## 2019-06-25 PROCEDURE — 99214 OFFICE O/P EST MOD 30 MIN: CPT | Mod: S$GLB,,, | Performed by: PHYSICIAN ASSISTANT

## 2019-06-25 RX ORDER — METHOCARBAMOL 750 MG/1
750 TABLET, FILM COATED ORAL
COMMUNITY
End: 2019-06-25 | Stop reason: SDUPTHER

## 2019-06-25 NOTE — PROGRESS NOTES
Established Patient    SUBJECTIVE:     History of Present Illness:  Saul Hancock is a 54 y.o. male who presents for neurosurgical re-evaluation after MRI L-spine.  Patient was last seen in clinic on 6/14/19 (see below).  He is doing better since last visit with gabapentin and Robaxin p.r.n..  He continues to have radiating pain/numbness down both of his legs into his big toe bilaterally (right greater than left).  He is now also complaining of intermittent pain in his anal area.  However, he denies any leg weakness, bladder/bowel incontinence, or gait instability.  Patient was waiting for MRI results before starting physical therapy in [facility].       Interval History  6/14/19  Saul Hancock is a 54 y.o. male with a history of anxiety, arthritis, and status post right L5-S1 microdiskectomy with Dr. Hernandez on 01/19/2018 who presents for neurosurgical re-evaluation.  Patient was last seen in clinic on 09/17/2018 and had complete resolution right leg pain after surgery.  He presents today for re-evaluation after recent fall.  Patient reports that on 06/05/2019 he fell directly into a sink hole that was waist deep.  When he was pulled out, he heard a popping noise in his back.  He developed back pain and went to the ED for this.  Since then, he has been having constant aching/popping/stabbing lower back/buttocks pain and is rated 7/10.  He also has associated pain in his groin.  He also has constant numbness/tingling radiating from posterior leg into big toe on both legs (right > left).  This pain feels like the same pain that he had prior to his lumbar surgery.  Denies any leg weakness, bladder/bowel incontinence, or gait instability.  He has not been taking any medications for this because he is afraid of opioid dependence and constipation side effects.            Low Back Pain Scale  R Low Back-Pain Score: 5  R Low Back-Pain Intensity: The pain is bad, but I manage without taking pain killers  R Low  "Back-Pain Score: I can look after myself normally without causing extra pain  Low Back-Lifting: (n/a)   Low Back-Walking: Pain does not prevent me from walking any distance   Low Back-Sitting: (n/a)   Low Back-Standing: I have some pain on standing but it does not increase with time   Low Back-Sleeping: Because of pain my normal nights sleep is reduced by less than one half   Low Back-Social Life: My social life is normal but it increases the degree of pain   Low Back-Traveling: (n/a)   Low Back-Changing Degree of Pain: (n/a)             Review of patient's allergies indicates:   Allergen Reactions    Corticosteroids (glucocorticoids)      "Psychosis"    Cashew nut Rash       Current Outpatient Medications   Medication Sig Dispense Refill    gabapentin (NEURONTIN) 300 MG capsule Take 1 capsule (300 mg total) by mouth 2 (two) times daily. 60 capsule 11    methocarbamol (ROBAXIN) 750 MG Tab Take 1 tablet (750 mg total) by mouth 4 (four) times daily. for 10 days 75 tablet 0    naproxen (EC NAPROSYN) 500 MG EC tablet Take 1 tablet (500 mg total) by mouth 2 (two) times daily. for 14 days 28 tablet 1     No current facility-administered medications for this visit.        Past Medical History:   Diagnosis Date    Anxiety     Arthritis     Sciatica      Past Surgical History:   Procedure Laterality Date    INJECTION-STEROID-EPIDURAL-TRANSFORAMINAL Right 11/14/2017    [facility]    LUMBAR EPIDURAL INJECTION  11/14/2017    MICRODISCECTOMY Right L5-S1 Microdiscectomy Right 1/19/2018    Performed by Adelso Hernandez MD at [facility] OR [address] [place] [address]     Family History     Problem Relation (Age of Onset)    Hypertension Mother    Leukemia Brother    Lung cancer Father    No Known Problems Sister, Brother, Brother        Social History     Socioeconomic History    Marital status:      Spouse name: Not on file    Number of children: 3    Years of education: Not on file    Highest " education level: Not on file   Occupational History    Occupation:      Comment: NOFD   Social Needs    Financial resource strain: Not on file    Food insecurity:     Worry: Not on file     Inability: Not on file    Transportation needs:     Medical: Not on file     Non-medical: Not on file   Tobacco Use    Smoking status: Never Smoker    Smokeless tobacco: Never Used   Substance and Sexual Activity    Alcohol use: No    Drug use: No    Sexual activity: Yes     Partners: Female   Lifestyle    Physical activity:     Days per week: Not on file     Minutes per session: Not on file    Stress: Not on file   Relationships    Social connections:     Talks on phone: Not on file     Gets together: Not on file     Attends Quaker service: Not on file     Active member of club or organization: Not on file     Attends meetings of clubs or organizations: Not on file     Relationship status: Not on file   Other Topics Concern    Not on file   Social History Narrative    The patient does not exercise regularly ().      He is not satisfied with weight.    Rates diet as good.    He does not drink at least 1/2 gallon water daily.    He drinks 2-3 coffee/tea/caffeine-containing soft drinks daily.    Total sleep time at night is 8 hours.    He works 40 hours per week.    He does wear seat belts.    Hobbies include none.       Review of Systems:  Review of Systems    Constitutional: no fever, chills or night sweats. No changes in weight   Eyes: no visual changes   ENT: no nasal congestion or sore throat   Respiratory: no cough or shortness of breath   Cardiovascular: no chest pain or palpitations   Gastrointestinal: no nausea or vomiting   Genitourinary: no hematuria or dysuria   Integument/Breast: no rash or pruritis   Hematologic/Lymphatic: no easy bruising or lymphadenopathy   Musculoskeletal: no arthralgias or myalgias.  Neurological: no seizures or tremors. No weakness.  Positive for bilateral lower  "extremity paresthesia.   Behavioral/Psych: no auditory or visual hallucinations   Endocrine: no heat or cold intolerance     OBJECTIVE:     Vital Signs  Temp: 98.3 °F (36.8 °C)  Pulse: 74  BP: (!) 154/95  Pain Score:   5  Height: 6' 2" (188 cm)  Weight: 107 kg (235 lb 14.3 oz)  Body mass index is 30.29 kg/m².    Physical Exam:  Neurosurgery Physical Exam    General: well developed, well nourished, no distress.   Neurologic: Awake, alert and oriented x3. Thought content appropriate.  GCS: Motor: 6/Verbal: 5/Eyes: 4 GCS Total: 15  Cranial nerves: II-XII grossly intact. PERRLA. EOMI without nystagmus. Face symmetric and sensation intact to light touch, tongue midline, shoulder shrug symmetric bilaterally.  Hearing equal bilaterally to finger rub. Palate and uvula rise and fall normally in midline.    Language: no aphasia  Speech: no dysarthria   Neck: supple, without obvious masses    Sensory: intact to light touch B/L UE and LE  Motor Strength: Moves all extremities spontaneously with good tone. Full strength upper and lower extremities. No abnormal movements seen.    Strength  Deltoids Triceps Biceps Wrist Extension Wrist Flexion Hand    Upper: R 5/5 5/5 5/5 5/5 5/5 5/5    L 5/5 5/5 5/5 5/5 5/5 5/5     Iliopsoas Quadriceps Knee  Flexion Tibialis  anterior Gastro- cnemius EHL   Lower: R 5/5 5/5 5/5 5/5 5/5 5/5    L 5/5 5/5 5/5 5/5 5/5 5/5     Interossi muscle strength- intact    DTR's - 2 + and symmetric in UE and LE  Valerio's - Negative        Lhermitte's - Negative  Spurlings-   Negative         Ankle Clonus - Negative           Babinski  - Negative     SLR - Negative   Gait - normal      Tandem Gait - No difficulty    Able to walk/stand on heels & toes  Cervical ROM - full  Lumbar ROM - full; with mild pain with all ROM  No difficulty transitioning from seated to standing position or vice versa.  ENT: normal hearing with finger rub  Heart: RRR, no cyanosis, pallor, or edema.   Lungs- normal respiratory " effort  Abdomen-  soft, symmetric and nontender  Skin: grossly intact in all 4 extremities without obvious rashes or lesions  Extremities: warm with no cyanosis or edema, or clubbing  Pulses: palpable distal pulses    Posture-  No obvious kyphosis with standing posture.  With bending posture, no obvious scapula wing        Diagnostic Results:  All imaging personally reviewed    Lumbar flexion-extension x-rays dated 06/14/2019  Mild degenerative disc disease at L5-S1.  No other acute findings.    MRI L-spine dated 06/21/2019 personally reviewed Dr. Hernandez    ASSESSMENT/PLAN:     54 y.o. male with a history of anxiety, arthritis, and status post right L5-S1 microdiskectomy with Dr. Hernandez on 01/19/2018 who had complete resolution of right leg symptoms after surgery.  He presents today for re-evaluation of new low back pain, intermittent anal plain, and bilateral leg pain/paresthesia  after his fall on 06/05/2018.  Patient is full strength on exam with negative straight leg raise bilaterally.  MRI L-spine 06/21/2019 did show degenerative disc disease L5-S1 with possible small disc extrusion on right causing right lateral recess stenosis and impingement on the nerve root at this level.  We will start with conservative treatment at this time.  He is to increase his gabapentin to 300 mg t.i.d., continue Robaxin p.r.n. muscle spasm, and start physical therapy.  He is highly allergic to lumbar epidural steroid injection; previously, it caused him to have psychosis and required hospital admission.  Also, he still has residual effects from this.  Given his bad reactions, we will avoid lumbar epidural steroid injection.  He will follow-up with Dr. Hernandez in clinic in 4 weeks to discuss possible surgical treatment options if no improvement.           All imaging were reviewed with patient, family, and staff. All questions were answered.  Patient verbalized understanding and agreed with the above plan of care.  Patient was  encouraged to call clinic with any future concerns prior to follow up appt. If any worsening symptoms, patient should report to ED.     Please call with any questions.    Discussed with Dr. Hernandez and he agreed with the above plan of care.     Bal Murcia PA-C  Neurosurgery          Note dictated with voice recognition software, please excuse any grammatical errors.

## 2019-06-25 NOTE — TELEPHONE ENCOUNTER
Mr. Hancock informed of scheduled appointment - patient will like to know if he should continue with PT

## 2019-07-29 ENCOUNTER — TELEPHONE (OUTPATIENT)
Dept: NEUROSURGERY | Facility: CLINIC | Age: 54
End: 2019-07-29

## 2019-07-29 ENCOUNTER — OFFICE VISIT (OUTPATIENT)
Dept: NEUROSURGERY | Facility: CLINIC | Age: 54
End: 2019-07-29
Payer: OTHER MISCELLANEOUS

## 2019-07-29 VITALS
SYSTOLIC BLOOD PRESSURE: 149 MMHG | BODY MASS INDEX: 30.62 KG/M2 | HEART RATE: 87 BPM | DIASTOLIC BLOOD PRESSURE: 95 MMHG | HEIGHT: 74 IN | TEMPERATURE: 99 F | WEIGHT: 238.56 LBS

## 2019-07-29 DIAGNOSIS — M54.17 LUMBOSACRAL RADICULOPATHY AT S1: ICD-10-CM

## 2019-07-29 DIAGNOSIS — M54.17 LUMBOSACRAL RADICULOPATHY AT L5: ICD-10-CM

## 2019-07-29 DIAGNOSIS — M51.27 LUMBOSACRAL DISC HERNIATION: Primary | ICD-10-CM

## 2019-07-29 DIAGNOSIS — M48.061 FORAMINAL STENOSIS OF LUMBAR REGION: ICD-10-CM

## 2019-07-29 PROCEDURE — 99213 PR OFFICE/OUTPT VISIT, EST, LEVL III, 20-29 MIN: ICD-10-PCS | Mod: S$GLB,,, | Performed by: NEUROLOGICAL SURGERY

## 2019-07-29 PROCEDURE — 99213 OFFICE O/P EST LOW 20 MIN: CPT | Mod: S$GLB,,, | Performed by: NEUROLOGICAL SURGERY

## 2019-07-29 PROCEDURE — 99999 PR PBB SHADOW E&M-EST. PATIENT-LVL III: CPT | Mod: PBBFAC,,, | Performed by: NEUROLOGICAL SURGERY

## 2019-07-29 PROCEDURE — 99999 PR PBB SHADOW E&M-EST. PATIENT-LVL III: ICD-10-PCS | Mod: PBBFAC,,, | Performed by: NEUROLOGICAL SURGERY

## 2019-07-29 RX ORDER — GABAPENTIN 300 MG/1
600 CAPSULE ORAL 3 TIMES DAILY
Qty: 180 CAPSULE | Refills: 11 | Status: SHIPPED | OUTPATIENT
Start: 2019-07-29 | End: 2020-07-28

## 2019-07-29 RX ORDER — NAPROXEN 500 MG/1
500 TABLET ORAL 2 TIMES DAILY WITH MEALS
Qty: 28 TABLET | Refills: 0 | Status: SHIPPED | OUTPATIENT
Start: 2019-07-29 | End: 2019-08-26 | Stop reason: SDUPTHER

## 2019-07-29 RX ORDER — METHOCARBAMOL 750 MG/1
750 TABLET, FILM COATED ORAL 3 TIMES DAILY PRN
Qty: 60 TABLET | Refills: 0 | Status: SHIPPED | OUTPATIENT
Start: 2019-07-29 | End: 2019-08-08

## 2019-07-29 NOTE — PROGRESS NOTES
NEUROSURGICAL PROGRESS NOTE    DATE OF SERVICE:  07/29/2019    ATTENDING PHYSICIAN:  Adelso Hernandez MD    SUBJECTIVE:    INTERIM HISTORY:    This is a very pleasant 54 y.o. male, who had a right L5-S1 microdiskectomy in January 2018.  Did very well after surgery with complete resolution of right leg pain went back to work.  On June 5th he fell while working and when he was pulled out of a hole he started to have back pain and bilateral leg pain.  Right leg pain is worse than left leg pain.  The pain has been severe.  The the back pain is as intense as the leg pain.  The leg pain is felt in the L5 and S1 distribution bilaterally.  Leg pain is associated with numbness in the L5 and S1 distribution.  Patient walks leaning forward.  He cannot received steroid injection because he had psychological side effects with steroids in the past.  He has not tried physical therapy yet.  He is currently on gabapentin 300 mg b.i.d..  No sphincter dysfunction.    Low Back Pain Scale  R Low Back-Pain Score: 8  R Low Back-Pain Intensity: The pain is bad, but I manage without taking pain killers  R Low Back-Pain Score: It is painful to look after myself and I am slow and careful  Low Back-Lifting: (n/a)   Low Back-Walking: (n/a)   Low Back-Sitting: (n/a)   Low Back-Standing: (n/a)   Low Back-Sleeping: (n/a)   Low Back-Social Life: (n/a)   Low Back-Traveling: (n/a)   Low Back-Changing Degree of Pain: My pain is gradually worsening         PAST MEDICAL HISTORY:  Active Ambulatory Problems     Diagnosis Date Noted    Cervical radiculopathy 10/23/2017    DDD (degenerative disc disease), lumbar 10/23/2017    Facet arthropathy, lumbar 10/23/2017    Impaired mobility and activities of daily living 11/10/2017    S/P lumbar microdiscectomy 03/13/2018    Weakness of trunk musculature 03/13/2018     Resolved Ambulatory Problems     Diagnosis Date Noted    Lumbar disc herniation with radiculopathy 11/07/2017    Chronic pain syndrome  11/10/2017    Spinal stenosis of lumbar region without neurogenic claudication 12/29/2017    Radiculopathy 01/19/2018    Segmental and somatic dysfunction of lumbar region 03/13/2018    Back tightness 03/13/2018     Past Medical History:   Diagnosis Date    Anxiety     Arthritis     Sciatica        PAST SURGICAL HISTORY:  Past Surgical History:   Procedure Laterality Date    INJECTION-STEROID-EPIDURAL-TRANSFORAMINAL Right 11/14/2017    [facility]    LUMBAR EPIDURAL INJECTION  11/14/2017    MICRODISCECTOMY Right L5-S1 Microdiscectomy Right 1/19/2018    Performed by [facility]elso Hernandez MD at [facility] OR [address] [place] [address]       SOCIAL HISTORY:   Social History     Socioeconomic History    Marital status:      Spouse name: Not on file    Number of children: 3    Years of education: Not on file    Highest education level: Not on file   Occupational History    Occupation:      Comment: NOFD   Social Needs    Financial resource strain: Not on file    Food insecurity:     Worry: Not on file     Inability: Not on file    Transportation needs:     Medical: Not on file     Non-medical: Not on file   Tobacco Use    Smoking status: Never Smoker    Smokeless tobacco: Never Used   Substance and Sexual Activity    Alcohol use: No    Drug use: No    Sexual activity: Yes     Partners: Female   Lifestyle    Physical activity:     Days per week: Not on file     Minutes per session: Not on file    Stress: Not on file   Relationships    Social connections:     Talks on phone: Not on file     Gets together: Not on file     Attends Episcopal service: Not on file     Active member of club or organization: Not on file     Attends meetings of clubs or organizations: Not on file     Relationship status: Not on file   Other Topics Concern    Not on file   Social History Narrative    The patient does not exercise regularly ().      He is not satisfied with weight.     "Rates diet as good.    He does not drink at least 1/2 gallon water daily.    He drinks 2-3 coffee/tea/caffeine-containing soft drinks daily.    Total sleep time at night is 8 hours.    He works 40 hours per week.    He does wear seat belts.    Hobbies include none.       FAMILY HISTORY:  Family History   Problem Relation Age of Onset    Hypertension Mother     Lung cancer Father     No Known Problems Sister     Leukemia Brother     No Known Problems Brother     No Known Problems Brother        CURRENTS MEDICATIONS:  Current Outpatient Medications on File Prior to Visit   Medication Sig Dispense Refill    [DISCONTINUED] gabapentin (NEURONTIN) 300 MG capsule Take 1 capsule (300 mg total) by mouth 2 (two) times daily. 60 capsule 11     No current facility-administered medications on file prior to visit.        ALLERGIES:  Review of patient's allergies indicates:   Allergen Reactions    Corticosteroids (glucocorticoids)      "Psychosis"    Cashew nut Rash       REVIEW OF SYSTEMS:  Review of Systems   Constitutional: Negative for diaphoresis, fever and weight loss.   Respiratory: Negative for shortness of breath.    Cardiovascular: Negative for chest pain.   Gastrointestinal: Negative for blood in stool.   Genitourinary: Negative for hematuria.   Endo/Heme/Allergies: Does not bruise/bleed easily.   All other systems reviewed and are negative.        OBJECTIVE:    PHYSICAL EXAMINATION:   Vitals:    07/29/19 0900   BP: (!) 149/95   Pulse: 87   Temp: 98.6 °F (37 °C)       Physical Exam:  Vitals reviewed.    Constitutional: He appears well-developed and well-nourished.     Eyes: Pupils are equal, round, and reactive to light. Conjunctivae and EOM are normal.     Cardiovascular: Normal distal pulses and no edema.     Abdominal: Soft.     Skin: Skin displays no rash on trunk and no rash on extremities. Skin displays no lesions on trunk and no lesions on extremities.     Psych/Behavior: He is alert. He is oriented to " person, place, and time. He has a normal mood and affect.     Musculoskeletal:        Neck: Range of motion is full.     Neurological:        DTRs: Tricep reflexes are 2+ on the right side and 2+ on the left side. Bicep reflexes are 2+ on the right side and 2+ on the left side. Brachioradialis reflexes are 2+ on the right side and 2+ on the left side. Patellar reflexes are 2+ on the right side and 2+ on the left side. Achilles reflexes are 2+ on the right side and 2+ on the left side.       Back Exam     Tenderness   The patient is experiencing tenderness in the lumbar.    Range of Motion   Extension: abnormal   Flexion: normal   Lateral bend right: normal   Lateral bend left: normal   Rotation right: normal   Rotation left: normal     Muscle Strength   Right Quadriceps:  5/5   Left Quadriceps:  5/5   Right Hamstrings:  5/5   Left Hamstrings:  5/5     Tests   Straight leg raise right: positive  Straight leg raise left: negative    Other   Toe walk: normal  Heel walk: normal            SI joint:   Palpation at the right and left SI joints not painful  AVA test is negative bilaterally  Gaenslen test is negative bilaterally  Thigh thrust test is negative bilaterally    Neurologic Exam     Mental Status   Oriented to person, place, and time.   Speech: speech is normal   Level of consciousness: alert    Cranial Nerves   Cranial nerves II through XII intact.     CN III, IV, VI   Pupils are equal, round, and reactive to light.  Extraocular motions are normal.     Motor Exam   Muscle bulk: normal  Overall muscle tone: normal    Strength   Right deltoid: 5/5  Left deltoid: 5/5  Right biceps: 5/5  Left biceps: 5/5  Right triceps: 5/5  Left triceps: 5/5  Right wrist flexion: 5/5  Left wrist flexion: 5/5  Right wrist extension: 5/5  Left wrist extension: 5/5  Right interossei: 5/5  Left interossei: 5/5  Right iliopsoas: 5/5  Left iliopsoas: 5/5  Right quadriceps: 5/5  Left quadriceps: 5/5  Right hamstrin/5  Left  hamstrin/5  Right anterior tibial: 5/5  Left anterior tibial: 5/5  Right posterior tibial: 5/5  Left posterior tibial: 5/5  Right peroneal: 5/5  Left peroneal: 5/5  Right gastroc: 5/5  Left gastroc: 5/5    Sensory Exam   Light touch normal.   Pinprick normal.     Gait, Coordination, and Reflexes     Gait  Gait: normal    Coordination   Finger to nose coordination: normal  Tandem walking coordination: normal    Reflexes   Right brachioradialis: 2+  Left brachioradialis: 2+  Right biceps: 2+  Left biceps: 2+  Right triceps: 2+  Left triceps: 2+  Right patellar: 2+  Left patellar: 2+  Right achilles: 2+  Left achilles: 2+  Right plantar: normal  Left plantar: normal  Right Valerio: absent  Left Valerio: absent  Right ankle clonus: absent  Left ankle clonus: absent        DIAGNOSTIC DATA:  I personally interpreted the following imaging:   Lumbar spine MRI 2019 compared to 2017:  Severe L5-S1 degenerative disc disease, Right L5-S1 disc herniation causing lateral recess stenosis and foraminal stenosis, left L5-S1 foraminal stenosis    ASSESMENT:  This is a 54 y.o. male with     Problem List Items Addressed This Visit     None      Visit Diagnoses     Lumbosacral disc herniation    -  Primary    Relevant Orders    Ambulatory consult to Physical Therapy    Lumbosacral radiculopathy at S1        Relevant Orders    Ambulatory consult to Physical Therapy    Foraminal stenosis of lumbar region        Lumbosacral radiculopathy at L5                PLAN:  Recommending lumbar physical therapy 3 times a week for 6 weeks  Gabapentin increased to 600 mg 3 times daily  Robaxin 750 mg 3 times daily as needed for muscle spasms  Naproxen 500 mg b.i.d. for 2 weeks  All questions answered  Will follow up after Physical therapy          Adelso Hernandez MD  Cell:[phone]

## 2019-08-07 ENCOUNTER — TELEPHONE (OUTPATIENT)
Dept: NEUROSURGERY | Facility: CLINIC | Age: 54
End: 2019-08-07

## 2019-08-07 NOTE — TELEPHONE ENCOUNTER
Spoke to the patient instructed Dr Hernandez wanted to follow up with the patient after PT. Patient states he was not able to schedule Pt jessie to Worker comp not approving this. Instructed I received a message on yesterday stating he is approved for PT. Instructed to contact his  with workers comp to verify this and the call me back once PT is scheduled to schedule a follow up appointment. SALMA

## 2019-08-19 ENCOUNTER — CLINICAL SUPPORT (OUTPATIENT)
Dept: REHABILITATION | Facility: HOSPITAL | Age: 54
End: 2019-08-19
Attending: NEUROLOGICAL SURGERY
Payer: OTHER MISCELLANEOUS

## 2019-08-19 DIAGNOSIS — M54.42 CHRONIC BILATERAL LOW BACK PAIN WITH BILATERAL SCIATICA: ICD-10-CM

## 2019-08-19 DIAGNOSIS — R68.89 DECREASED STRENGTH, ENDURANCE, AND MOBILITY: ICD-10-CM

## 2019-08-19 DIAGNOSIS — R20.2 NUMBNESS AND TINGLING OF LOWER EXTREMITY: ICD-10-CM

## 2019-08-19 DIAGNOSIS — R53.1 DECREASED STRENGTH, ENDURANCE, AND MOBILITY: ICD-10-CM

## 2019-08-19 DIAGNOSIS — Z74.09 DECREASED STRENGTH, ENDURANCE, AND MOBILITY: ICD-10-CM

## 2019-08-19 DIAGNOSIS — R20.0 NUMBNESS AND TINGLING OF LOWER EXTREMITY: ICD-10-CM

## 2019-08-19 DIAGNOSIS — M54.41 CHRONIC BILATERAL LOW BACK PAIN WITH BILATERAL SCIATICA: ICD-10-CM

## 2019-08-19 DIAGNOSIS — M25.69 DECREASED ROM OF TRUNK AND BACK: ICD-10-CM

## 2019-08-19 DIAGNOSIS — G89.29 CHRONIC BILATERAL LOW BACK PAIN WITH BILATERAL SCIATICA: ICD-10-CM

## 2019-08-19 PROCEDURE — 97110 THERAPEUTIC EXERCISES: CPT | Mod: PN

## 2019-08-19 PROCEDURE — 97161 PT EVAL LOW COMPLEX 20 MIN: CPT | Mod: PN
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
## 2019-08-21 NOTE — PROGRESS NOTES
Physical Therapy Daily Treatment Note     Name: Saul Hancock  Clinic Number: [id number]    Therapy Diagnosis:   Encounter Diagnoses   Name Primary?    Chronic bilateral low back pain with bilateral sciatica     Decreased ROM of trunk and back     Decreased strength, endurance, and mobility     Numbness and tingling of lower extremity      Physician: Adelso Hernandez MD    Visit Date: 8/22/2019  Physician Orders: PT Eval and Treat   Recurrent right L5-S1 disc herniation with radicvulopathy. Lumbar spine physical therapy 3 times a week for 6 weeks.   Back extensor and core muscles strengthening. Myofascial release. Marylin exercises. Home exercises.      Medical Diagnosis from Referral:   M51.27 (ICD-10-CM) - Lumbosacral disc herniation   M54.17 (ICD-10-CM) - Lumbosacral radiculopathy at S1   Evaluation Date: 8/19/2019  Authorization Period Expiration: 7/28/2020  Plan of Care Expiration: 11/19/2019  Visit # / Visits authorized: 2/12    Time In: 11:06a  Time Out: 12:00a  Total Billable Time: 54 minutes    Precautions: Standard    Subjective     Pt reports: he went to the grocery store last night and didn't use a cart and he was holding a lot of items in a box and has been in constant pain since then. Pt reports the stretching hurts the middle of his back while he is doing them but once he relaxes it he feels a lot of relief in his buttocks region. He reports laying on his back with his knees straight hurts but with his knees bent it feels good.   He was compliant with home exercise program.  Response to previous treatment: okay  Functional change: no change     Pain: 7/10  Location: bilateral back , lower legs and upper legs     Objective     Saul received therapeutic exercises to develop strength, endurance, ROM, flexibility, posture and core stabilization for 54 minutes including:    Treadmill x6 min, 1.4mph  Sciatic nerve flossing x10 ea - seated   Hamstring stretch [id number]   Prone press ups 2x10  "  Posterior pelvic tilts 2x10, 3 sec   LTR (no PB) x1 min   DKTC on PB x1 min   Seated lumbar extension x15  Seated Lumbar flexion x15  Rows, RTB 2x10   HL hip abduction, RTB x30  HL hip adduction, x30, 3 sec hold   Open Books x20 ea   Clamshells no resistance, 2x10    Home Exercises Provided and Patient Education Provided     Education provided:   - noticing minimal increases/decreases in pain throughout the day    Written Home Exercises Provided: Patient instructed to cont prior HEP.  Exercises were reviewed and Saul was able to demonstrate them prior to the end of the session.  Saul demonstrated good  understanding of the education provided.     See EMR under Patient Instructions for exercises provided 8/19/2019.    Assessment     Pt tolerated treatment fairly. Currently, his symptoms are inconclusive as to whether flexion based exercises or extension based exercises centralize his pain. Difficult to assess pt's tolerance to various exercises due to his continuous response of "I'm good"; however, pt had facial grimacing with exercises attempted. Pt had good tolerance to rows and seated sciatic nerve glides. Continue with strengthening and stretching exercises as able and tolerated.     Saul is progressing well towards his goals.   Pt prognosis is Good.     Pt will continue to benefit from skilled outpatient physical therapy to address the deficits listed in the problem list box on initial evaluation, provide pt/family education and to maximize pt's level of independence in the home and community environment.     Pt's spiritual, cultural and educational needs considered and pt agreeable to plan of care and goals.     Anticipated barriers to physical therapy: prior lumbar surgery and chronic condition      Goals:  Short Term Goals: 4 weeks      1. Pt will be independent with HEP in order to demonstrate self management.   2. Pt will report a decrease in pain at its worse to 7/10 in order to improve quality " of life.   3. Pt will increase lumbar extension, sidebending, and rotation ROM by 5 degrees to decrease pain and increase functional mobility.   4. Pt will increase BLE MMT by 1/3 muscle grade in order to demonstrate increased strength.   5. Pt will be able to lift a 5-10# item off the floor with < or = to minimal difficulty in order to improve tolerance to lifting and carrying groceries.      Long Term Goals: 8 weeks      1. Pt will be independent with updated HEP in order to demonstrate self management.   2. Pt will report a decrease in pain at its worse to 5/10 in order to improve quality of life.   3. Pt will increase lumbar ROM by 10 to improve gait and functional mobility.   4. Pt will increase BLE MMT by 1 muscle grade to improve strength and endurance to perform daily activities.   5. Pt will have a FOTO limitation score of < or = to 34% to demonstrate improved functional mobility.   6. Pt will be able to perform his usual work and housework activities with < or = to minimal difficulty.     Plan     Progress through trunk/core strengthening and minimizing leg pain    Marine Swanson, PT   08/22/2019

## 2019-08-22 ENCOUNTER — CLINICAL SUPPORT (OUTPATIENT)
Dept: REHABILITATION | Facility: HOSPITAL | Age: 54
End: 2019-08-22
Payer: OTHER MISCELLANEOUS

## 2019-08-22 DIAGNOSIS — R53.1 DECREASED STRENGTH, ENDURANCE, AND MOBILITY: ICD-10-CM

## 2019-08-22 DIAGNOSIS — M25.69 DECREASED ROM OF TRUNK AND BACK: ICD-10-CM

## 2019-08-22 DIAGNOSIS — R68.89 DECREASED STRENGTH, ENDURANCE, AND MOBILITY: ICD-10-CM

## 2019-08-22 DIAGNOSIS — G89.29 CHRONIC BILATERAL LOW BACK PAIN WITH BILATERAL SCIATICA: ICD-10-CM

## 2019-08-22 DIAGNOSIS — M54.41 CHRONIC BILATERAL LOW BACK PAIN WITH BILATERAL SCIATICA: ICD-10-CM

## 2019-08-22 DIAGNOSIS — M54.42 CHRONIC BILATERAL LOW BACK PAIN WITH BILATERAL SCIATICA: ICD-10-CM

## 2019-08-22 DIAGNOSIS — R20.2 NUMBNESS AND TINGLING OF LOWER EXTREMITY: ICD-10-CM

## 2019-08-22 DIAGNOSIS — R20.0 NUMBNESS AND TINGLING OF LOWER EXTREMITY: ICD-10-CM

## 2019-08-22 DIAGNOSIS — Z74.09 DECREASED STRENGTH, ENDURANCE, AND MOBILITY: ICD-10-CM

## 2019-08-22 PROCEDURE — 97110 THERAPEUTIC EXERCISES: CPT | Mod: PN

## 2019-08-26 ENCOUNTER — TELEPHONE (OUTPATIENT)
Dept: NEUROSURGERY | Facility: CLINIC | Age: 54
End: 2019-08-26

## 2019-08-26 ENCOUNTER — CLINICAL SUPPORT (OUTPATIENT)
Dept: REHABILITATION | Facility: HOSPITAL | Age: 54
End: 2019-08-26
Payer: OTHER MISCELLANEOUS

## 2019-08-26 DIAGNOSIS — R68.89 DECREASED STRENGTH, ENDURANCE, AND MOBILITY: ICD-10-CM

## 2019-08-26 DIAGNOSIS — R20.0 NUMBNESS AND TINGLING OF LOWER EXTREMITY: ICD-10-CM

## 2019-08-26 DIAGNOSIS — M54.42 CHRONIC BILATERAL LOW BACK PAIN WITH BILATERAL SCIATICA: Primary | ICD-10-CM

## 2019-08-26 DIAGNOSIS — R53.1 DECREASED STRENGTH, ENDURANCE, AND MOBILITY: ICD-10-CM

## 2019-08-26 DIAGNOSIS — Z74.09 DECREASED STRENGTH, ENDURANCE, AND MOBILITY: ICD-10-CM

## 2019-08-26 DIAGNOSIS — G89.29 CHRONIC BILATERAL LOW BACK PAIN WITH BILATERAL SCIATICA: Primary | ICD-10-CM

## 2019-08-26 DIAGNOSIS — M25.69 DECREASED ROM OF TRUNK AND BACK: ICD-10-CM

## 2019-08-26 DIAGNOSIS — R20.2 NUMBNESS AND TINGLING OF LOWER EXTREMITY: ICD-10-CM

## 2019-08-26 DIAGNOSIS — M54.41 CHRONIC BILATERAL LOW BACK PAIN WITH BILATERAL SCIATICA: Primary | ICD-10-CM

## 2019-08-26 PROCEDURE — 97110 THERAPEUTIC EXERCISES: CPT | Mod: PN

## 2019-08-26 RX ORDER — NAPROXEN 500 MG/1
500 TABLET ORAL 2 TIMES DAILY WITH MEALS
Qty: 28 TABLET | Refills: 0 | Status: SHIPPED | OUTPATIENT
Start: 2019-08-26 | End: 2019-08-28 | Stop reason: SDUPTHER

## 2019-08-26 NOTE — TELEPHONE ENCOUNTER
Saul Hancock would like a refill of the following medications:         naproxen (NAPROSYN) 500 MG tablet [Adelso Hernandez MD]     [facility]erred pharmacy[id number] [place] [id number] [facility]   Delivery method: Pickup

## 2019-08-26 NOTE — PROGRESS NOTES
"    Physical Therapy Daily Treatment Note     Name: Saul Hancock  Clinic Number: [id number]    Therapy Diagnosis:   Encounter Diagnoses   Name Primary?    Chronic bilateral low back pain with bilateral sciatica Yes    Decreased ROM of trunk and back     Decreased strength, endurance, and mobility     Numbness and tingling of lower extremity      Physician: Adelso Hernandez MD    Visit Date: 8/26/2019  Physician Orders: PT Eval and Treat   Recurrent right L5-S1 disc herniation with radicvulopathy. Lumbar spine physical therapy 3 times a week for 6 weeks.   Back extensor and core muscles strengthening. Myofascial release. Marylin exercises. Home exercises.      Medical Diagnosis from Referral:   M51.27 (ICD-10-CM) - Lumbosacral disc herniation   M54.17 (ICD-10-CM) - Lumbosacral radiculopathy at S1   Evaluation Date: 8/19/2019  Authorization Period Expiration: 7/28/2020  Plan of Care Expiration: 11/19/2019  Visit # / Visits authorized: 3/12    Time In: 1059  Time Out: 1158  Total Billable Time: 59 minutes    Precautions: Standard    Subjective     Pt reports: 5/10 LBP with B numbness front/back of legs to the toes. Reports that it is hard to describe but sometimes he will step and get a shock throughout his entire body that is 10/10 pain. Reports he isn't noticing a difference with the exercises.    Objective     Saul received therapeutic exercises to develop strength, endurance, ROM, flexibility, posture and core stabilization for 57 minutes including:    Treadmill x7 min, 2 mph  ANGE x2 min  Pelvic tilts 20x5"  Pelvic tilt with adduction squeeze 20x5"  Bridges x15 *pain  Sciatic nerve glides 10x5 ankle pumps  Clamshells x30 B  Hamstring stretch [id number]   Shoulder extension RTB x30 on foam  Rows, RTB x30 on foam  Standing hip flexor st (calf st) at wall 2x30" B    Pt received the following manual therapy techniques for 2 minutes   Femoral nerve glides x20 in prone    Home Exercises Provided and Patient " Education Provided     Education provided:   - noticing minimal increases/decreases in pain throughout the day    Written Home Exercises Provided: Patient instructed to cont prior HEP.  Exercises were reviewed and Saul was able to demonstrate them prior to the end of the session.  Saul demonstrated good  understanding of the education provided.     See EMR under Patient Instructions for exercises provided 8/19/2019.    Assessment     Pt tolerated treatment fair with reports of increased pain during nerve glides and bridges. Reviewed press ups-patient performing incorrectly and when instructed on proper form, exercise increased pain. Press ups modified to prone on elbows. Patient laughs throughout tx and pain and has difficulty answering direct questions regarding pain. Monitor response to ANGE exercise. Continue with strengthening and stretching exercises as able and tolerated.     Saul is progressing well towards his goals.   Pt prognosis is Good.     Pt will continue to benefit from skilled outpatient physical therapy to address the deficits listed in the problem list box on initial evaluation, provide pt/family education and to maximize pt's level of independence in the home and community environment.     Pt's spiritual, cultural and educational needs considered and pt agreeable to plan of care and goals.     Anticipated barriers to physical therapy: prior lumbar surgery and chronic condition      Goals:  Short Term Goals: 4 weeks      1. Pt will be independent with HEP in order to demonstrate self management.   2. Pt will report a decrease in pain at its worse to 7/10 in order to improve quality of life.   3. Pt will increase lumbar extension, sidebending, and rotation ROM by 5 degrees to decrease pain and increase functional mobility.   4. Pt will increase BLE MMT by 1/3 muscle grade in order to demonstrate increased strength.   5. Pt will be able to lift a 5-10# item off the floor with < or = to  minimal difficulty in order to improve tolerance to lifting and carrying groceries.      Long Term Goals: 8 weeks      1. Pt will be independent with updated HEP in order to demonstrate self management.   2. Pt will report a decrease in pain at its worse to 5/10 in order to improve quality of life.   3. Pt will increase lumbar ROM by 10 to improve gait and functional mobility.   4. Pt will increase BLE MMT by 1 muscle grade to improve strength and endurance to perform daily activities.   5. Pt will have a FOTO limitation score of < or = to 34% to demonstrate improved functional mobility.   6. Pt will be able to perform his usual work and housework activities with < or = to minimal difficulty.     Plan     Progress through trunk/core strengthening and minimizing leg pain    Adrianne Vargas, PT   08/26/2019

## 2019-08-28 ENCOUNTER — TELEPHONE (OUTPATIENT)
Dept: NEUROSURGERY | Facility: CLINIC | Age: 54
End: 2019-08-28

## 2019-08-28 RX ORDER — NAPROXEN 500 MG/1
500 TABLET ORAL 2 TIMES DAILY WITH MEALS
Qty: 28 TABLET | Refills: 0 | Status: SHIPPED | OUTPATIENT
Start: 2019-08-28 | End: 2019-10-23 | Stop reason: SDUPTHER

## 2019-08-28 NOTE — TELEPHONE ENCOUNTER
Spoke with pharmacist at [facility], script has been cancelled.     Please re send script to [facility] - per pt request.       Thanks

## 2019-08-28 NOTE — PROGRESS NOTES
"    Physical Therapy Daily Treatment Note     Name: Saul Hancock  Clinic Number: [id number]    Therapy Diagnosis:   No diagnosis found.  Physician: Adelso Hernandez MD    Visit Date: 8/29/2019  Physician Orders: PT Eval and Treat   Recurrent right L5-S1 disc herniation with radicvulopathy. Lumbar spine physical therapy 3 times a week for 6 weeks.   Back extensor and core muscles strengthening. Myofascial release. Marylin exercises. Home exercises.      Medical Diagnosis from Referral:   M51.27 (ICD-10-CM) - Lumbosacral disc herniation   M54.17 (ICD-10-CM) - Lumbosacral radiculopathy at S1   Evaluation Date: 8/19/2019  Authorization Period Expiration: 7/28/2020  Plan of Care Expiration: 11/19/2019  Visit # / Visits authorized: 4/12    Time In: 1100  Time Out: 1157  Total Billable Time: 57 minutes    Precautions: Standard    Subjective     Pt reports: he is still in a lot of pain but that he feels like he is walking stronger.    Objective     Saul received therapeutic exercises to develop strength, endurance, ROM, flexibility, posture and core stabilization for 55 minutes including:    Treadmill x8 min, 2 mph  ANGE x2 min  Pelvic tilts 20x5" *modified to TA contraction  TA contraction with adduction squeeze 20x5"  Bridges x5 *pain  Sciatic nerve glides 10x5 ankle pumps  Clamshells x30 B  Piriformis stretch 2x30" ea  Hamstring stretch [id number] ea  Shoulder extension RTB x30 on foam  Rows, RTB x30 on foam  pallof press GTB x20  Standing hip flexor st (calf st) at wall 2x30" B    Pt received the following manual therapy techniques for 2 minutes   Femoral nerve glides x20 in prone    Home Exercises Provided and Patient Education Provided     Education provided:   - noticing minimal increases/decreases in pain throughout the day  -lumbar roll for support when sitting    Written Home Exercises Provided: Patient instructed to cont prior HEP.  Exercises were reviewed and Saul was able to demonstrate them prior to the " end of the session.  Saul demonstrated good  understanding of the education provided.     See EMR under Patient Instructions for exercises provided 8/19/2019.    Assessment     Pt tolerated treatment fair with reports of increased pain throughout tx. Patient with reports of increased back pain during pelvic tilts even when modified to TA contraction with no pelvic movement. Patient unclear with answers to questions regarding pain. Requires frequent cues to self monitor symptoms and not push to the point of increased pain. Continue with strengthening and stretching exercises as able and tolerated.     Saul is progressing well towards his goals.   Pt prognosis is Good.     Pt will continue to benefit from skilled outpatient physical therapy to address the deficits listed in the problem list box on initial evaluation, provide pt/family education and to maximize pt's level of independence in the home and community environment.     Pt's spiritual, cultural and educational needs considered and pt agreeable to plan of care and goals.     Anticipated barriers to physical therapy: prior lumbar surgery and chronic condition      Goals:  Short Term Goals: 4 weeks      1. Pt will be independent with HEP in order to demonstrate self management.   2. Pt will report a decrease in pain at its worse to 7/10 in order to improve quality of life.   3. Pt will increase lumbar extension, sidebending, and rotation ROM by 5 degrees to decrease pain and increase functional mobility.   4. Pt will increase BLE MMT by 1/3 muscle grade in order to demonstrate increased strength.   5. Pt will be able to lift a 5-10# item off the floor with < or = to minimal difficulty in order to improve tolerance to lifting and carrying groceries.      Long Term Goals: 8 weeks      1. Pt will be independent with updated HEP in order to demonstrate self management.   2. Pt will report a decrease in pain at its worse to 5/10 in order to improve quality of  life.   3. Pt will increase lumbar ROM by 10 to improve gait and functional mobility.   4. Pt will increase BLE MMT by 1 muscle grade to improve strength and endurance to perform daily activities.   5. Pt will have a FOTO limitation score of < or = to 34% to demonstrate improved functional mobility.   6. Pt will be able to perform his usual work and housework activities with < or = to minimal difficulty.     Plan     Progress through trunk/core strengthening and minimizing leg pain    Adrianne Vargas, PT   08/28/2019

## 2019-08-28 NOTE — TELEPHONE ENCOUNTER
----- Message from Andria Magana sent at 8/27/2019 11:55 AM CDT -----  Contact: self  Patient states that Rx was sent to wrong pharmacy. Please send Rx for naproxen (NAPROSYN) 500 MG tablet to [facility] #[id number] - [facility] [address] [place] [address].

## 2019-08-29 ENCOUNTER — CLINICAL SUPPORT (OUTPATIENT)
Dept: REHABILITATION | Facility: HOSPITAL | Age: 54
End: 2019-08-29
Payer: OTHER MISCELLANEOUS

## 2019-08-29 DIAGNOSIS — R53.1 DECREASED STRENGTH, ENDURANCE, AND MOBILITY: ICD-10-CM

## 2019-08-29 DIAGNOSIS — M25.69 DECREASED ROM OF TRUNK AND BACK: ICD-10-CM

## 2019-08-29 DIAGNOSIS — M54.42 CHRONIC BILATERAL LOW BACK PAIN WITH BILATERAL SCIATICA: Primary | ICD-10-CM

## 2019-08-29 DIAGNOSIS — G89.29 CHRONIC BILATERAL LOW BACK PAIN WITH BILATERAL SCIATICA: Primary | ICD-10-CM

## 2019-08-29 DIAGNOSIS — M54.41 CHRONIC BILATERAL LOW BACK PAIN WITH BILATERAL SCIATICA: Primary | ICD-10-CM

## 2019-08-29 DIAGNOSIS — Z74.09 DECREASED STRENGTH, ENDURANCE, AND MOBILITY: ICD-10-CM

## 2019-08-29 DIAGNOSIS — R20.0 NUMBNESS AND TINGLING OF LOWER EXTREMITY: ICD-10-CM

## 2019-08-29 DIAGNOSIS — R20.2 NUMBNESS AND TINGLING OF LOWER EXTREMITY: ICD-10-CM

## 2019-08-29 DIAGNOSIS — R68.89 DECREASED STRENGTH, ENDURANCE, AND MOBILITY: ICD-10-CM

## 2019-08-29 PROCEDURE — 97110 THERAPEUTIC EXERCISES: CPT | Mod: PN

## 2019-09-09 ENCOUNTER — CLINICAL SUPPORT (OUTPATIENT)
Dept: REHABILITATION | Facility: HOSPITAL | Age: 54
End: 2019-09-09
Payer: OTHER MISCELLANEOUS

## 2019-09-09 DIAGNOSIS — M54.41 CHRONIC BILATERAL LOW BACK PAIN WITH BILATERAL SCIATICA: ICD-10-CM

## 2019-09-09 DIAGNOSIS — Z74.09 DECREASED STRENGTH, ENDURANCE, AND MOBILITY: ICD-10-CM

## 2019-09-09 DIAGNOSIS — R53.1 DECREASED STRENGTH, ENDURANCE, AND MOBILITY: ICD-10-CM

## 2019-09-09 DIAGNOSIS — R68.89 DECREASED STRENGTH, ENDURANCE, AND MOBILITY: ICD-10-CM

## 2019-09-09 DIAGNOSIS — R20.0 NUMBNESS AND TINGLING OF LOWER EXTREMITY: ICD-10-CM

## 2019-09-09 DIAGNOSIS — G89.29 CHRONIC BILATERAL LOW BACK PAIN WITH BILATERAL SCIATICA: ICD-10-CM

## 2019-09-09 DIAGNOSIS — M54.42 CHRONIC BILATERAL LOW BACK PAIN WITH BILATERAL SCIATICA: ICD-10-CM

## 2019-09-09 DIAGNOSIS — R20.2 NUMBNESS AND TINGLING OF LOWER EXTREMITY: ICD-10-CM

## 2019-09-09 DIAGNOSIS — M25.69 DECREASED ROM OF TRUNK AND BACK: ICD-10-CM

## 2019-09-09 PROCEDURE — 97110 THERAPEUTIC EXERCISES: CPT | Mod: PN

## 2019-09-09 PROCEDURE — 97530 THERAPEUTIC ACTIVITIES: CPT | Mod: PN

## 2019-09-09 NOTE — PROGRESS NOTES
"    Physical Therapy Daily Treatment Note     Name: Saul Hancock  Clinic Number: [id number]    Therapy Diagnosis:   Encounter Diagnoses   Name Primary?    Chronic bilateral low back pain with bilateral sciatica     Decreased ROM of trunk and back     Decreased strength, endurance, and mobility     Numbness and tingling of lower extremity      Physician: Adelso Hernandez MD    Visit Date: 9/9/2019  Physician Orders: PT Eval and Treat   Recurrent right L5-S1 disc herniation with radicvulopathy. Lumbar spine physical therapy 3 times a week for 6 weeks.   Back extensor and core muscles strengthening. Myofascial release. Marylin exercises. Home exercises.      Medical Diagnosis from Referral:   M51.27 (ICD-10-CM) - Lumbosacral disc herniation   M54.17 (ICD-10-CM) - Lumbosacral radiculopathy at S1   Evaluation Date: 8/19/2019    Progress note due: 9/19/2019  Authorization Period Expiration: 7/28/2020  Plan of Care Expiration: 11/19/2019  Visit # / Visits authorized: 5/12    Time In: 10:48a  Time Out: 11:45a  Total Billable Time: 57 minutes    Precautions: Standard    Subjective     Pt reports: he is in a lot of pain and has since stopped doing the exercises because he wanted to give himself a break. He cannot lay on his R side because of increased pain in his hip. He says the numbness is the same from the waist down. He feels slight fluctuations of pain when he is laying down and going from standing <> sitting. Repeatedly throughout the session, pt reported he is not a sissy and he wants to work with the pain because he thinks this will make him stronger.   Pain level: 10/10 in back     Objective     Saul received therapeutic exercises to develop strength, endurance, ROM, flexibility, posture and core stabilization for 25 minutes including:    Treadmill x8 min, 2 mph  +SKTC 3x30 sec- with some relief   ANGE x2 min  TrA contraction, 20x 5''  TA contraction with adduction squeeze 20x5"  Bridges x5 *pain  Sciatic " "nerve glides 10x5 ankle pumps  Clamshells x30 B  Piriformis stretch 2x30" ea  Glute stretch 2x30'' ea  Hamstring stretch [id number] ea  Shoulder extension RTB x30 on foam  Rows, RTB x30 on foam  PALOF press GTB x20  Standing hip flexor st (calf st) at wall 2x30" B  +matrix hip abduction/adduction/knee extension/knee flexion/leg press/rows/chest press x10 ea with 10-25# on ea   +Trunk rotation on machine with no weight x20  Lumbar flexion roll outs x10  Standing back extension x10   Standing minimal back flexion x10 - more relief     Pt received the following manual therapy techniques for 00 minutes   Femoral nerve glides x20 in prone    Home Exercises Provided and Patient Education Provided: 32 minutes with education and advice    Education provided:   - walking program, 5x/wk, 30 min a day  - doing enjoyable activities that bring him happiness and cause him to not focus on the pain   - resilience of the human body   - performing activities to pain and not past and through pain   - decreasing sitting for prolonged periods of time without rest breaks   - decreasing fear avoidance behaviors    Written Home Exercises Provided: Patient instructed to cont prior HEP.  Exercises were reviewed and Saul was able to demonstrate them prior to the end of the session.  Saul demonstrated good  understanding of the education provided.     See EMR under Patient Instructions for exercises provided 8/19/2019.    Assessment     Pt tolerated treatment fairly with continued reports of pain with every exercise. Discussed with patient about pain science and how to not "concentrate" on the pain. Educated to perform a walking program since he can tolerate that activity and rarely any other activities. Educated to perform standing rest breaks if he is going to be sitting for a prolonged period of time. Pt had mixed feelings about all education provided due to his present beliefs of his condition and re-injuring himself with further " activities. He continues with increased pain in spine and buttocks during multiple exercises that are inconsistent with a favorable direction (extension vs flexion). PT focused on improving LE strength and core strength in different ways to develop a variety of ways to incorporate physical activity. Pt had least difficulty with trunk rotation on machine and chest press. Pt would benefit from further communication, education, advice, and strengthening to improve his quality of life.    Saul is progressing well towards his goals.   Pt prognosis is Good.     Pt will continue to benefit from skilled outpatient physical therapy to address the deficits listed in the problem list box on initial evaluation, provide pt/family education and to maximize pt's level of independence in the home and community environment.     Pt's spiritual, cultural and educational needs considered and pt agreeable to plan of care and goals.     Anticipated barriers to physical therapy: prior lumbar surgery and chronic condition      Goals:  Short Term Goals: 4 weeks      1. Pt will be independent with HEP in order to demonstrate self management.   2. Pt will report a decrease in pain at its worse to 7/10 in order to improve quality of life.   3. Pt will increase lumbar extension, sidebending, and rotation ROM by 5 degrees to decrease pain and increase functional mobility.   4. Pt will increase BLE MMT by 1/3 muscle grade in order to demonstrate increased strength.   5. Pt will be able to lift a 5-10# item off the floor with < or = to minimal difficulty in order to improve tolerance to lifting and carrying groceries.      Long Term Goals: 8 weeks      1. Pt will be independent with updated HEP in order to demonstrate self management.   2. Pt will report a decrease in pain at its worse to 5/10 in order to improve quality of life.   3. Pt will increase lumbar ROM by 10 to improve gait and functional mobility.   4. Pt will increase BLE MMT by 1  muscle grade to improve strength and endurance to perform daily activities.   5. Pt will have a FOTO limitation score of < or = to 34% to demonstrate improved functional mobility.   6. Pt will be able to perform his usual work and housework activities with < or = to minimal difficulty.     Plan     Progress through trunk/core strengthening and minimizing leg pain    Marine Swanson, PT   09/09/2019

## 2019-09-12 ENCOUNTER — CLINICAL SUPPORT (OUTPATIENT)
Dept: REHABILITATION | Facility: HOSPITAL | Age: 54
End: 2019-09-12
Payer: OTHER MISCELLANEOUS

## 2019-09-12 DIAGNOSIS — Z74.09 DECREASED STRENGTH, ENDURANCE, AND MOBILITY: ICD-10-CM

## 2019-09-12 DIAGNOSIS — M54.41 CHRONIC BILATERAL LOW BACK PAIN WITH BILATERAL SCIATICA: ICD-10-CM

## 2019-09-12 DIAGNOSIS — R68.89 DECREASED STRENGTH, ENDURANCE, AND MOBILITY: ICD-10-CM

## 2019-09-12 DIAGNOSIS — M25.69 DECREASED ROM OF TRUNK AND BACK: ICD-10-CM

## 2019-09-12 DIAGNOSIS — R20.2 NUMBNESS AND TINGLING OF LOWER EXTREMITY: ICD-10-CM

## 2019-09-12 DIAGNOSIS — M54.42 CHRONIC BILATERAL LOW BACK PAIN WITH BILATERAL SCIATICA: ICD-10-CM

## 2019-09-12 DIAGNOSIS — R20.0 NUMBNESS AND TINGLING OF LOWER EXTREMITY: ICD-10-CM

## 2019-09-12 DIAGNOSIS — G89.29 CHRONIC BILATERAL LOW BACK PAIN WITH BILATERAL SCIATICA: ICD-10-CM

## 2019-09-12 DIAGNOSIS — R53.1 DECREASED STRENGTH, ENDURANCE, AND MOBILITY: ICD-10-CM

## 2019-09-12 PROCEDURE — 97110 THERAPEUTIC EXERCISES: CPT | Mod: PN

## 2019-09-12 NOTE — PROGRESS NOTES
"    Physical Therapy Daily Treatment Note     Name: Saul Hancock  Clinic Number: [id number]    Therapy Diagnosis:   Encounter Diagnoses   Name Primary?    Chronic bilateral low back pain with bilateral sciatica     Decreased ROM of trunk and back     Decreased strength, endurance, and mobility     Numbness and tingling of lower extremity      Physician: Adelso Hernandez MD    Visit Date: 9/12/2019  Physician Orders: PT Eval and Treat   Recurrent right L5-S1 disc herniation with radicvulopathy. Lumbar spine physical therapy 3 times a week for 6 weeks.   Back extensor and core muscles strengthening. Myofascial release. Marylin exercises. Home exercises.      Medical Diagnosis from Referral:   M51.27 (ICD-10-CM) - Lumbosacral disc herniation   M54.17 (ICD-10-CM) - Lumbosacral radiculopathy at S1   Evaluation Date: 8/19/2019    Progress note due: 9/19/2019  Authorization Period Expiration: 7/28/2020  Plan of Care Expiration: 11/19/2019  Visit # / Visits authorized: 6/12    Time In: 10:00a  Time Out: 10:55a  Total Billable Time: 30 minutes    Precautions: Standard    Subjective     Pt reports: he has been walking in 15 minute bouts 2x on the treadmill and he is trying to focus on his core rather than the pain.   Pain level: 10/10 in back     Objective     Saul received therapeutic exercises to develop strength, endurance, ROM, flexibility, posture and core stabilization for 55 minutes including:    Treadmill x8 min, 2 mph  Nustep x6 min  SKTC 3x30 sec- severe pain - discontinued   ANGE x2 min  TrA contraction, 20x 5''   TA contraction with adduction squeeze 20x5"  Bridges x5 *pain  Sciatic nerve glides 10x5 ankle pumps  Clamshells x30, RTB, B  Piriformis stretch 2x30" ea  Glute stretch 2x30'' ea  Hamstring stretch [id number] ea  Shoulder extension RTB x30 on foam  Rows, RTB x30 on foam  PALOF press RTB x20  Standing hip flexor st (calf st) at wall 2x30" B  Matrix: x10 ea       hip abduction (20#)      Hip " adduction (20#)      Knee extension (15#)      Knee flexion (15#)      Leg press (25#)      Rows (25#)      Chest press (25#)    Trunk rotation on machine with no weight x20  +Seated min back flexion, extension, sidebending, rotation x10 ea way     Pt received the following manual therapy techniques for 00 minutes   Femoral nerve glides x20 in prone    Home Exercises Provided and Patient Education Provided:     Education provided:   - decreasing pain in legs with extension based exercises    Written Home Exercises Provided: Patient instructed to cont prior HEP.  Exercises were reviewed and Saul was able to demonstrate them prior to the end of the session.  Saul demonstrated good  understanding of the education provided.     See EMR under Patient Instructions for exercises provided 8/19/2019.    Assessment     Pt tolerated treatment fair to poor. Pt is extremely sensitive to all exercises performed in supine, sitting, and standing. He has difficulty performing all exercises and has inconsistent pain in all directions. During seated lumbar flexion, sidebending, rotation he noted increased pain in buttocks and no pain in his buttocks when performing seated lumbar extension. When attempted to perform exercises in prone, pt refused to get into position because he knew it would hurt him. Pt had severe pain when attempting to perform SKTC which gave him relief last session. Attempt the green light, yellow light, red light approach with this patient next visit - determine his dominant pain (back or leg). Continue with POC.     Saul is progressing well towards his goals.   Pt prognosis is Good.     Pt will continue to benefit from skilled outpatient physical therapy to address the deficits listed in the problem list box on initial evaluation, provide pt/family education and to maximize pt's level of independence in the home and community environment.     Pt's spiritual, cultural and educational needs considered and  pt agreeable to plan of care and goals.     Anticipated barriers to physical therapy: prior lumbar surgery and chronic condition      Goals:  Short Term Goals: 4 weeks      1. Pt will be independent with HEP in order to demonstrate self management. Ongoing   2. Pt will report a decrease in pain at its worse to 7/10 in order to improve quality of life. Ongoing    3. Pt will increase lumbar extension, sidebending, and rotation ROM by 5 degrees to decrease pain and increase functional mobility. Ongoing    4. Pt will increase BLE MMT by 1/3 muscle grade in order to demonstrate increased strength. Ongoing   5. Pt will be able to lift a 5-10# item off the floor with < or = to minimal difficulty in order to improve tolerance to lifting and carrying groceries. Ongoing      Long Term Goals: 8 weeks      1. Pt will be independent with updated HEP in order to demonstrate self management. Ongoing   2. Pt will report a decrease in pain at its worse to 5/10 in order to improve quality of life. Ongoing   3. Pt will increase lumbar ROM by 10 to improve gait and functional mobility. Ongoing   4. Pt will increase BLE MMT by 1 muscle grade to improve strength and endurance to perform daily activities.   5. Pt will have a FOTO limitation score of < or = to 34% to demonstrate improved functional mobility.   6. Pt will be able to perform his usual work and housework activities with < or = to minimal difficulty.     Plan     Progress through trunk/core strengthening and minimizing leg pain    Marine Swanson, PT   09/12/2019

## 2019-09-20 ENCOUNTER — CLINICAL SUPPORT (OUTPATIENT)
Dept: REHABILITATION | Facility: HOSPITAL | Age: 54
End: 2019-09-20
Payer: OTHER MISCELLANEOUS

## 2019-09-20 DIAGNOSIS — R20.0 NUMBNESS AND TINGLING OF LOWER EXTREMITY: ICD-10-CM

## 2019-09-20 DIAGNOSIS — R68.89 DECREASED STRENGTH, ENDURANCE, AND MOBILITY: ICD-10-CM

## 2019-09-20 DIAGNOSIS — M54.41 CHRONIC BILATERAL LOW BACK PAIN WITH BILATERAL SCIATICA: ICD-10-CM

## 2019-09-20 DIAGNOSIS — M25.69 DECREASED ROM OF TRUNK AND BACK: ICD-10-CM

## 2019-09-20 DIAGNOSIS — R20.2 NUMBNESS AND TINGLING OF LOWER EXTREMITY: ICD-10-CM

## 2019-09-20 DIAGNOSIS — Z74.09 DECREASED STRENGTH, ENDURANCE, AND MOBILITY: ICD-10-CM

## 2019-09-20 DIAGNOSIS — R53.1 DECREASED STRENGTH, ENDURANCE, AND MOBILITY: ICD-10-CM

## 2019-09-20 DIAGNOSIS — G89.29 CHRONIC BILATERAL LOW BACK PAIN WITH BILATERAL SCIATICA: ICD-10-CM

## 2019-09-20 DIAGNOSIS — M54.42 CHRONIC BILATERAL LOW BACK PAIN WITH BILATERAL SCIATICA: ICD-10-CM

## 2019-09-20 PROCEDURE — 97110 THERAPEUTIC EXERCISES: CPT | Mod: PN

## 2019-09-20 NOTE — PROGRESS NOTES
Physical Therapy Daily Treatment Note     Name: Saul [facility] Number: [id number]    Therapy Diagnosis:   Encounter Diagnoses   Name Primary?    Chronic bilateral low back pain with bilateral sciatica     Decreased ROM of trunk and back     Decreased strength, endurance, and mobility     Numbness and tingling of lower extremity      Physician: Adelso Hernandez MD    Visit Date: 9/20/2019  Physician Orders: PT Eval and Treat   Recurrent right L5-S1 disc herniation with radicvulopathy. Lumbar spine physical therapy 3 times a week for 6 weeks.   Back extensor and core muscles strengthening. Myofascial release. Marylin exercises. Home exercises.      Medical Diagnosis from Referral:   M51.27 (ICD-10-CM) - Lumbosacral disc herniation   M54.17 (ICD-10-CM) - Lumbosacral radiculopathy at S1   Evaluation Date: 8/19/2019    Progress note due: 9/19/2019  Authorization Period Expiration: 7/28/2020  Plan of Care Expiration: 11/19/2019  Visit # / Visits authorized: 7/12    Time In: 10:00a  Time Out: 10:55a  Total Billable Time: 55 minutes    Precautions: Standard    Subjective     Pt reports: no new complaints. He still has extreme back pain and unable to perform a lot of his normal activities. Still getting the zapping feeling when walking, when going up stairs, when doing anything. Pt reports he feels like he is getting stronger though which is good for him. He states he is doing everything at work that he needs to be doing for work. If he had to lift anything heavy he just lets someone else do it. Pt has more pain with his back pain rather than his leg pain. He can deal with his leg pain, it is the back pain that really kills him.   Pain level: 10/10 in back     Objective     CMS Impairment/Limitation/Restriction for FOTO Lumbar Spine Survey  Status Limitation G-Code CMS Severity Modifier  Intake 57% 43%  Predicted 66% 34% Goal Status+ CJ - At least 20 percent but less than 40 percent  9/20/2019 53% 47%  "Current Status CK - At least 40 percent but less than 60 percent     Lumbar Range of Motion:     Degrees Pain   Flexion 70    p!         Extension 15    p!   Left Side Bending 25 p!         Right Side Bending 30 p!         Left rotation    50% p!         Right Rotation    50% p!            Lower Extremity Strength  Right LE   Left LE     Knee extension: 4/5 Knee extension: 4+/5   Knee flexion: 4-/5 Knee flexion: 4/5   Hip flexion: 4/5 p! Hip flexion: 4/5 p!   Hip extension:  3+/5 Hip extension: 4-/5   Hip abduction: 4-/5 Hip abduction: 4+/5   Hip ER 4/5 Hip ER 4/5   Hip IR 4/5 Hip IR 4/5   Hip adduction: 4-/5 Hip adduction 4-/5   Ankle dorsiflexion: 4/5 Ankle dorsiflexion: 4/5   Ankle plantarflexion: 3+/5 Ankle plantarflexion: 3+/5     Saul received therapeutic exercises to develop strength, endurance, ROM, flexibility, posture and core stabilization for 55 minutes including:    Treadmill x8 min, 2 mph  Nustep x6 min  Ankle pumps in z-lying position x20   TrA contraction, 20x 5'' - in z-lying position   Glute squeezes unilaterally, x20 ea side   DKTC x20   LTR x15 ea  TA contraction with adduction squeeze 20x5"  Sciatic nerve glides 10x5 ankle pumps  Clamshells x30, RTB, B  Shoulder extension RTB x30 on PB  Rows, RTB x30 on PB  PALOF press RTB x20 on PB  Standing hip flexor st (calf st) at wall 2x30" B  Matrix: x10 ea       hip abduction (20#)      Hip adduction (20#)      Knee extension (15#)      Knee flexion (15#)      Leg press (25#)      Rows (25#)      Chest press (25#)    Trunk rotation on machine with no weight x20  Seated min back flexion, extension, sidebending, rotation x10 ea way     Pt received the following manual therapy techniques for 00 minutes   Femoral nerve glides x20 in prone    Home Exercises Provided and Patient Education Provided:     Education provided:   - decreasing pain in legs with extension based exercises    Written Home Exercises Provided: Patient instructed to cont prior " HEP.  Exercises were reviewed and Saul was able to demonstrate them prior to the end of the session.  Saul demonstrated good  understanding of the education provided.     See EMR under Patient Instructions for exercises provided 8/19/2019.    Assessment     Pt tolerated treatment fair to poor. Every session consists of new exercises due to patient's poor tolerance to movement. Today we focused on de-centralizing his pain. We performed many exercises in a z-lying position in order to decrease his hypersensitivity. Pt had decreased complaints of pain, but continues to push past the point of pain. Discussed with patient about red light, yellow light, and green light movements - he has been instructed to remove red light movements that increase his back pain for now and decrease yellow light movements. A progress note was performed today.  Pt has not met any of his goals so far due to continued increase in back and leg pain. He is progressing fair to poorly towards his goals.     Saul is progressing poorly towards his goals.   Pt prognosis is Good.     Pt will continue to benefit from skilled outpatient physical therapy to address the deficits listed in the problem list box on initial evaluation, provide pt/family education and to maximize pt's level of independence in the home and community environment.     Pt's spiritual, cultural and educational needs considered and pt agreeable to plan of care and goals.     Anticipated barriers to physical therapy: prior lumbar surgery and chronic condition      Goals:  Short Term Goals: 4 weeks      1. Pt will be independent with HEP in order to demonstrate self management. Ongoing   2. Pt will report a decrease in pain at its worse to 7/10 in order to improve quality of life. Ongoing    3. Pt will increase lumbar extension, sidebending, and rotation ROM by 5 degrees to decrease pain and increase functional mobility. Ongoing    4. Pt will increase BLE MMT by 1/3 muscle  grade in order to demonstrate increased strength. Ongoing   5. Pt will be able to lift a 5-10# item off the floor with < or = to minimal difficulty in order to improve tolerance to lifting and carrying groceries. Ongoing      Long Term Goals: 8 weeks      1. Pt will be independent with updated HEP in order to demonstrate self management. Ongoing   2. Pt will report a decrease in pain at its worse to 5/10 in order to improve quality of life. Ongoing   3. Pt will increase lumbar ROM by 10 to improve gait and functional mobility. Ongoing   4. Pt will increase BLE MMT by 1 muscle grade to improve strength and endurance to perform daily activities. Ongoing   5. Pt will have a FOTO limitation score of < or = to 34% to demonstrate improved functional mobility. Ongoing   6. Pt will be able to perform his usual work and housework activities with < or = to minimal difficulty. Ongoing     Plan     Progress through trunk/core strengthening and minimizing leg pain    Marine Swanson, PT   09/20/2019

## 2019-09-24 ENCOUNTER — OFFICE VISIT (OUTPATIENT)
Dept: FAMILY MEDICINE | Facility: CLINIC | Age: 54
End: 2019-09-24
Attending: FAMILY MEDICINE
Payer: COMMERCIAL

## 2019-09-24 VITALS
HEART RATE: 77 BPM | WEIGHT: 243.5 LBS | HEIGHT: 74 IN | DIASTOLIC BLOOD PRESSURE: 94 MMHG | SYSTOLIC BLOOD PRESSURE: 144 MMHG | OXYGEN SATURATION: 96 % | BODY MASS INDEX: 31.25 KG/M2

## 2019-09-24 DIAGNOSIS — M51.36 DDD (DEGENERATIVE DISC DISEASE), LUMBAR: ICD-10-CM

## 2019-09-24 DIAGNOSIS — Z01.84 IMMUNITY STATUS TESTING: ICD-10-CM

## 2019-09-24 DIAGNOSIS — E78.00 ELEVATED CHOLESTEROL: ICD-10-CM

## 2019-09-24 DIAGNOSIS — Z12.11 COLON CANCER SCREENING: ICD-10-CM

## 2019-09-24 DIAGNOSIS — R03.0 ELEVATED BP WITHOUT DIAGNOSIS OF HYPERTENSION: ICD-10-CM

## 2019-09-24 DIAGNOSIS — Z12.5 PROSTATE CANCER SCREENING: ICD-10-CM

## 2019-09-24 DIAGNOSIS — R39.15 URINARY URGENCY: ICD-10-CM

## 2019-09-24 DIAGNOSIS — E78.6 LOW HDL (UNDER 40): ICD-10-CM

## 2019-09-24 DIAGNOSIS — Z00.00 ROUTINE GENERAL MEDICAL EXAMINATION AT A HEALTH CARE FACILITY: Primary | ICD-10-CM

## 2019-09-24 PROCEDURE — 99999 PR PBB SHADOW E&M-EST. PATIENT-LVL III: CPT | Mod: PBBFAC,,, | Performed by: FAMILY MEDICINE

## 2019-09-24 PROCEDURE — 3077F PR MOST RECENT SYSTOLIC BLOOD PRESSURE >= 140 MM HG: ICD-10-PCS | Mod: CPTII,S$GLB,, | Performed by: FAMILY MEDICINE

## 2019-09-24 PROCEDURE — 3080F DIAST BP >= 90 MM HG: CPT | Mod: CPTII,S$GLB,, | Performed by: FAMILY MEDICINE

## 2019-09-24 PROCEDURE — 3080F PR MOST RECENT DIASTOLIC BLOOD PRESSURE >= 90 MM HG: ICD-10-PCS | Mod: CPTII,S$GLB,, | Performed by: FAMILY MEDICINE

## 2019-09-24 PROCEDURE — 99396 PREV VISIT EST AGE 40-64: CPT | Mod: S$GLB,,, | Performed by: FAMILY MEDICINE

## 2019-09-24 PROCEDURE — 99999 PR PBB SHADOW E&M-EST. PATIENT-LVL III: ICD-10-PCS | Mod: PBBFAC,,, | Performed by: FAMILY MEDICINE

## 2019-09-24 PROCEDURE — 3077F SYST BP >= 140 MM HG: CPT | Mod: CPTII,S$GLB,, | Performed by: FAMILY MEDICINE

## 2019-09-24 PROCEDURE — 99396 PR PREVENTIVE VISIT,EST,40-64: ICD-10-PCS | Mod: S$GLB,,, | Performed by: FAMILY MEDICINE

## 2019-09-24 NOTE — PROGRESS NOTES
"Subjective:       Patient ID: Saul Hancock is a 54 y.o. male.    Chief Complaint: Annual Exam    HPI     The patient presents to the office today requesting a routine periodic health examination.  This is his first visit since Dec 2017.    Patient Active Problem List   Diagnosis    Cervical radiculopathy    DDD (degenerative disc disease), lumbar    Facet arthropathy, lumbar    Impaired mobility and activities of daily living    S/P lumbar microdiscectomy    Weakness of trunk musculature    Chronic bilateral low back pain with bilateral sciatica    Decreased ROM of trunk and back    Decreased strength, endurance, and mobility    Numbness and tingling of lower extremity    Psychosis    Elevated BP without diagnosis of hypertension    Elevated cholesterol    Low HDL (under 40)       Past Surgical History:   Procedure Laterality Date    LUMBAR EPIDURAL INJECTION  11/14/2017    MINIMALLY INVASIVE MICRODISCECTOMY LUMBAR SPINE  01/2018    L5-S1         Current Outpatient Medications:     gabapentin (NEURONTIN) 300 MG capsule, Take 2 capsules (600 mg total) by mouth 3 (three) times daily., Disp: 180 capsule, Rfl: 11    naproxen (NAPROSYN) 500 MG tablet, Take 1 tablet (500 mg total) by mouth 2 (two) times daily with meals., Disp: 28 tablet, Rfl: 0    Review of patient's allergies indicates:   Allergen Reactions    Corticosteroids (glucocorticoids)      "Psychosis"    Cashew nut Rash       Family History   Problem Relation Age of Onset    Hypertension Mother     Lung cancer Father     No Known Problems Sister     Leukemia Brother     No Known Problems Brother     No Known Problems Brother        Social History     Socioeconomic History    Marital status:      Spouse name: Not on file    Number of children: 3    Years of education: Not on file    Highest education level: Not on file   Occupational History    Occupation:      Comment: NOFD   Social Needs    " Financial resource strain: Not hard at all    Food insecurity:     Worry: Never true     Inability: Never true    Transportation needs:     Medical: No     Non-medical: No   Tobacco Use    Smoking status: Never Smoker    Smokeless tobacco: Never Used   Substance and Sexual Activity    Alcohol use: No    Drug use: No    Sexual activity: Yes     Partners: Female   Lifestyle    Physical activity:     Days per week: 7 days     Minutes per session: 30 min    Stress: To some extent   Relationships    Social connections:     Talks on phone: Once a week     Gets together: Once a week     Attends Yarsanism service: More than 4 times per year     Active member of club or organization: Yes     Attends meetings of clubs or organizations: More than 4 times per year     Relationship status:    Other Topics Concern    Not on file   Social History Narrative    He is not satisfied with weight.    Rates diet as good.    He does not drink at least 1/2 gallon water daily.    He drinks 2-3 coffee/tea/caffeine-containing soft drinks daily.    Total sleep time at night is 8 hours.    He works 40 hours per week.    He does wear seat belts.    Hobbies include none.       Patient Care Team:  Kenny Dasilva Jr., MD as PCP - General (Family Medicine)  Michelle Ball MA as Care Coordinator  Adelso Hernandez MD as Consulting Physician (Neurosurgery)  Daly Montalvo MD as Consulting Physician (Pain Medicine)      Review of Systems   Constitutional: Negative for activity change, fatigue and unexpected weight change.   HENT: Negative for ear discharge, ear pain, hearing loss, rhinorrhea, tinnitus, trouble swallowing and voice change.    Eyes: Negative for pain, discharge and visual disturbance.   Respiratory: Negative for cough, chest tightness, shortness of breath and wheezing.    Cardiovascular: Positive for palpitations. Negative for chest pain and leg swelling.   Gastrointestinal: Positive for constipation (BMs  "q1-2 days; firm, occ pellets). Negative for abdominal pain, blood in stool, diarrhea, nausea and vomiting.   Endocrine: Negative for polydipsia and polyuria.   Genitourinary: Positive for urgency. Negative for decreased urine volume, difficulty urinating, dysuria, enuresis, frequency and hematuria.   Musculoskeletal: Negative for arthralgias, back pain, joint swelling, myalgias and neck pain.   Skin: Negative for rash.   Neurological: Negative for dizziness, weakness, light-headedness and headaches.   Hematological: Does not bruise/bleed easily.   Psychiatric/Behavioral: Negative for confusion, dysphoric mood and sleep disturbance. The patient is not nervous/anxious.        Objective:      BP (!) 144/94   Pulse 77   Ht 6' 2" (1.88 m)   Wt 110.5 kg (243 lb 8 oz)   SpO2 96%   BMI 31.26 kg/m²     Physical Exam   Constitutional: He is oriented to person, place, and time. He appears well-developed and well-nourished. He is cooperative.   HENT:   Head: Normocephalic and atraumatic.   Right Ear: External ear normal.   Left Ear: External ear normal.   Nose: Nose normal.   Mouth/Throat: Oropharynx is clear and moist and mucous membranes are normal. No oropharyngeal exudate.   Eyes: Conjunctivae are normal. No scleral icterus.   Neck: Neck supple. No JVD present. Carotid bruit is not present. No thyromegaly present.   Cardiovascular: Normal rate, regular rhythm, normal heart sounds and normal pulses. Exam reveals no gallop and no friction rub.   No murmur heard.  Pulmonary/Chest: Effort normal and breath sounds normal. He has no wheezes. He has no rhonchi. He has no rales.   Abdominal: Soft. Bowel sounds are normal. He exhibits no distension and no mass. There is no splenomegaly or hepatomegaly. There is no tenderness.   Musculoskeletal: Normal range of motion. He exhibits no edema or tenderness.   Lymphadenopathy:     He has no cervical adenopathy.     He has no axillary adenopathy.   Neurological: He is alert and " "oriented to person, place, and time. He has normal strength and normal reflexes. No cranial nerve deficit or sensory deficit. Coordination normal.   Skin: Skin is warm and dry.   Psychiatric: He has a normal mood and affect.   Vitals reviewed.      Assessment:       1. Routine general medical examination at a health care facility    2. Elevated BP without diagnosis of hypertension    3. Elevated cholesterol    4. Low HDL (under 40)    5. DDD (degenerative disc disease), lumbar    6. Urinary urgency    7. Prostate cancer screening    8. Colon cancer screening    9. Immunity status testing        Plan:       Discussed routine examinations and screenings.   Agrees to flu vaccine.   Declines shingles vaccine. Unsure if he had "chicken pox."  Discussed colon cancer screening:  Patient agrees to colonoscopy.  Discussed with patient the importance of lifestyle modifications, including well-balanced diet and moderate exercise regimen, in reducing risk for cardiovascular/cerebrovascular disease and diabetes.  Labs (see Orders).    Orders Placed This Encounter    Comprehensive metabolic panel    Lipoprotein Analysis, by NMR    PSA, Screening    Urinalysis    Varicella zoster antibody, IgG    Case request GI: COLONOSCOPY     We will call the patient with results & make further recommendations at that time.               "This note will not be shared with the patient."  "

## 2019-09-24 NOTE — PATIENT INSTRUCTIONS
Your feedback is important to us.  If you should receive a survey in the next few days, please share your experience with us.    Test results will be communicated to you via : My [facility], Telephone or Letter.   If you have not received test results in one week, please contact the clinic at   [phone].

## 2019-09-25 ENCOUNTER — CLINICAL SUPPORT (OUTPATIENT)
Dept: REHABILITATION | Facility: HOSPITAL | Age: 54
End: 2019-09-25
Payer: OTHER MISCELLANEOUS

## 2019-09-25 DIAGNOSIS — M54.41 CHRONIC BILATERAL LOW BACK PAIN WITH BILATERAL SCIATICA: Primary | ICD-10-CM

## 2019-09-25 DIAGNOSIS — M25.69 DECREASED ROM OF TRUNK AND BACK: ICD-10-CM

## 2019-09-25 DIAGNOSIS — R68.89 DECREASED STRENGTH, ENDURANCE, AND MOBILITY: ICD-10-CM

## 2019-09-25 DIAGNOSIS — M54.42 CHRONIC BILATERAL LOW BACK PAIN WITH BILATERAL SCIATICA: Primary | ICD-10-CM

## 2019-09-25 DIAGNOSIS — G89.29 CHRONIC BILATERAL LOW BACK PAIN WITH BILATERAL SCIATICA: Primary | ICD-10-CM

## 2019-09-25 DIAGNOSIS — R20.2 NUMBNESS AND TINGLING OF LOWER EXTREMITY: ICD-10-CM

## 2019-09-25 DIAGNOSIS — R20.0 NUMBNESS AND TINGLING OF LOWER EXTREMITY: ICD-10-CM

## 2019-09-25 DIAGNOSIS — R53.1 DECREASED STRENGTH, ENDURANCE, AND MOBILITY: ICD-10-CM

## 2019-09-25 DIAGNOSIS — Z74.09 DECREASED STRENGTH, ENDURANCE, AND MOBILITY: ICD-10-CM

## 2019-09-25 PROCEDURE — 97140 MANUAL THERAPY 1/> REGIONS: CPT | Mod: PN

## 2019-09-25 PROCEDURE — 97110 THERAPEUTIC EXERCISES: CPT | Mod: PN

## 2019-09-25 NOTE — PROGRESS NOTES
"    Physical Therapy Daily Treatment Note     Name: Saul Hancock  Clinic Number: [id number]    Therapy Diagnosis:   Encounter Diagnoses   Name Primary?    Chronic bilateral low back pain with bilateral sciatica Yes    Decreased ROM of trunk and back     Decreased strength, endurance, and mobility     Numbness and tingling of lower extremity      Physician: Adelso Hernandez MD    Visit Date: 9/25/2019  Physician Orders: PT Eval and Treat   Recurrent right L5-S1 disc herniation with radicvulopathy. Lumbar spine physical therapy 3 times a week for 6 weeks.   Back extensor and core muscles strengthening. Myofascial release. Marylin exercises. Home exercises.      Medical Diagnosis from Referral:   M51.27 (ICD-10-CM) - Lumbosacral disc herniation   M54.17 (ICD-10-CM) - Lumbosacral radiculopathy at S1   Evaluation Date: 8/19/2019    Progress note due: 9/19/2019  Authorization Period Expiration: 7/28/2020  Plan of Care Expiration: 11/19/2019  Visit # / Visits authorized: 8/12    Time In: 10:00a  Time Out: 1100am  Total Billable Time: 60 minutes    Precautions: Standard    Subjective     Pt reports: he is having a rough day. Nothing seems to make his pain better so he just keeps moving. He is in constant pain and everything hurts. Some movements might be ok one time then they are worse another time but everything is painful.  Pain level: 10/10 in back     Objective       Saul received therapeutic exercises to develop strength, endurance, ROM, flexibility, posture and core stabilization for 45 minutes including:      Today:  ANGE x2 min  Self manual distraction off back of mat x1 min  Pelvic tilts 20x5"  TA with adduction squeeze 20x5"  Bridges x10  MET for R anterior/L posterior rotated innominate  Supine hip flexor stretch off side of mat x1 min with overpressure ea  pallof press RTB x20  TM x5 min      Pt received the following manual therapy techniques for 15 minutes   Manual distraction BLE on SB and  With " LEs extended      Home Exercises Provided and Patient Education Provided:     Education provided:   Continued movement  Noticing what movements decrease pain versus make pain worse    Written Home Exercises Provided: Patient instructed to cont prior HEP.  Exercises were reviewed and Saul was able to demonstrate them prior to the end of the session.  Saul demonstrated good  understanding of the education provided.     See EMR under Patient Instructions for exercises provided 8/19/2019.    Assessment     Pt tolerated treatment poorly. Every session consists of new exercises due to patient's poor tolerance to movement. Attempted manual traction techniques with reports of decreased pain during traction, however, no changes upon standing again. Pt reeducated in log roll technique for supine/sit. Pt very poor historian and unable to verbalize if any movements improve pain, reporting that it varies every day. Unable to find consistent pattern to improve symptoms. Patient visually crying during TM ambulation at end of treatment and had to be told to stop walking. Pt declined modalities and wanted to go home. Patient told he should speak with his doctor regarding pain as nothing has improved. He is progressing fair to poorly towards his goals.     Saul is progressing poorly towards his goals.   Pt prognosis is Good.     Pt will continue to benefit from skilled outpatient physical therapy to address the deficits listed in the problem list box on initial evaluation, provide pt/family education and to maximize pt's level of independence in the home and community environment.     Pt's spiritual, cultural and educational needs considered and pt agreeable to plan of care and goals.     Anticipated barriers to physical therapy: prior lumbar surgery and chronic condition      Goals:  Short Term Goals: 4 weeks      1. Pt will be independent with HEP in order to demonstrate self management. Ongoing   2. Pt will report a  decrease in pain at its worse to 7/10 in order to improve quality of life. Ongoing    3. Pt will increase lumbar extension, sidebending, and rotation ROM by 5 degrees to decrease pain and increase functional mobility. Ongoing    4. Pt will increase BLE MMT by 1/3 muscle grade in order to demonstrate increased strength. Ongoing   5. Pt will be able to lift a 5-10# item off the floor with < or = to minimal difficulty in order to improve tolerance to lifting and carrying groceries. Ongoing      Long Term Goals: 8 weeks      1. Pt will be independent with updated HEP in order to demonstrate self management. Ongoing   2. Pt will report a decrease in pain at its worse to 5/10 in order to improve quality of life. Ongoing   3. Pt will increase lumbar ROM by 10 to improve gait and functional mobility. Ongoing   4. Pt will increase BLE MMT by 1 muscle grade to improve strength and endurance to perform daily activities. Ongoing   5. Pt will have a FOTO limitation score of < or = to 34% to demonstrate improved functional mobility. Ongoing   6. Pt will be able to perform his usual work and housework activities with < or = to minimal difficulty. Ongoing     Plan     Progress through trunk/core strengthening and minimizing leg pain    Adrianne Vargas, PT   09/25/2019

## 2019-09-27 ENCOUNTER — LAB VISIT (OUTPATIENT)
Dept: LAB | Facility: HOSPITAL | Age: 54
End: 2019-09-27
Attending: FAMILY MEDICINE
Payer: COMMERCIAL

## 2019-09-27 DIAGNOSIS — E78.00 ELEVATED CHOLESTEROL: ICD-10-CM

## 2019-09-27 DIAGNOSIS — Z01.84 IMMUNITY STATUS TESTING: ICD-10-CM

## 2019-09-27 DIAGNOSIS — E78.6 LOW HDL (UNDER 40): ICD-10-CM

## 2019-09-27 DIAGNOSIS — R03.0 ELEVATED BP WITHOUT DIAGNOSIS OF HYPERTENSION: ICD-10-CM

## 2019-09-27 DIAGNOSIS — Z12.5 PROSTATE CANCER SCREENING: ICD-10-CM

## 2019-09-27 DIAGNOSIS — R39.15 URINARY URGENCY: ICD-10-CM

## 2019-09-27 LAB
ALBUMIN SERPL BCP-MCNC: 4.2 G/DL (ref 3.5–5.2)
ALP SERPL-CCNC: 74 U/L (ref 55–135)
ALT SERPL W/O P-5'-P-CCNC: 26 U/L (ref 10–44)
ANION GAP SERPL CALC-SCNC: 6 MMOL/L (ref 8–16)
AST SERPL-CCNC: 19 U/L (ref 10–40)
BILIRUB SERPL-MCNC: 0.4 MG/DL (ref 0.1–1)
BUN SERPL-MCNC: 13 MG/DL (ref 6–20)
CALCIUM SERPL-MCNC: 9.6 MG/DL (ref 8.7–10.5)
CHLORIDE SERPL-SCNC: 105 MMOL/L (ref 95–110)
CO2 SERPL-SCNC: 28 MMOL/L (ref 23–29)
COMPLEXED PSA SERPL-MCNC: 0.76 NG/ML (ref 0–4)
CREAT SERPL-MCNC: 1.1 MG/DL (ref 0.5–1.4)
EST. GFR  (AFRICAN AMERICAN): >60 ML/MIN/1.73 M^2
EST. GFR  (NON AFRICAN AMERICAN): >60 ML/MIN/1.73 M^2
GLUCOSE SERPL-MCNC: 91 MG/DL (ref 70–110)
POTASSIUM SERPL-SCNC: 4.1 MMOL/L (ref 3.5–5.1)
PROT SERPL-MCNC: 8 G/DL (ref 6–8.4)
SODIUM SERPL-SCNC: 139 MMOL/L (ref 136–145)

## 2019-09-27 PROCEDURE — 84153 ASSAY OF PSA TOTAL: CPT

## 2019-09-27 PROCEDURE — 86787 VARICELLA-ZOSTER ANTIBODY: CPT

## 2019-09-27 PROCEDURE — 80053 COMPREHEN METABOLIC PANEL: CPT

## 2019-09-27 PROCEDURE — 80061 LIPID PANEL: CPT

## 2019-10-01 ENCOUNTER — PATIENT OUTREACH (OUTPATIENT)
Dept: ADMINISTRATIVE | Facility: HOSPITAL | Age: 54
End: 2019-10-01

## 2019-10-01 PROBLEM — Z91.89 FRAMINGHAM CARDIAC RISK <10% IN NEXT 10 YEARS: Status: ACTIVE | Noted: 2019-10-01

## 2019-10-03 ENCOUNTER — PATIENT MESSAGE (OUTPATIENT)
Dept: FAMILY MEDICINE | Facility: CLINIC | Age: 54
End: 2019-10-03

## 2019-10-03 DIAGNOSIS — Z91.89 FRAMINGHAM CARDIAC RISK <10% IN NEXT 10 YEARS: ICD-10-CM

## 2019-10-03 LAB
CHOLEST SERPL-MCNC: 197 MG/DL
HDL SERPL QN: 8.5 NM
HDL SERPL-SCNC: 25.7 UMOL/L
HDLC SERPL-MCNC: 39 MG/DL (ref 40–59)
HLD.LARGE SERPL-SCNC: <2.8 UMOL/L
LDL SERPL QN: 20.6 NM
LDL SERPL-SCNC: 1771 NMOL/L
LDL SMALL SERPL-SCNC: 1003 NMOL/L
LDLC SERPL CALC-MCNC: 139 MG/DL
PATHOLOGY STUDY: ABNORMAL
TRIGL SERPL-MCNC: 97 MG/DL (ref 30–149)
VARICELLA INTERPRETATION: POSITIVE
VARICELLA ZOSTER IGG: 3.27 ISR (ref 0–0.9)
VLDL LARGE SERPL-SCNC: <1.5 NMOL/L
VLDL SERPL QN: 46.2 NM

## 2019-10-16 ENCOUNTER — PATIENT MESSAGE (OUTPATIENT)
Dept: FAMILY MEDICINE | Facility: CLINIC | Age: 54
End: 2019-10-16

## 2019-10-24 RX ORDER — NAPROXEN 500 MG/1
TABLET ORAL
Qty: 28 TABLET | Refills: 0 | Status: SHIPPED | OUTPATIENT
Start: 2019-10-24 | End: 2020-02-10

## 2019-11-12 ENCOUNTER — PATIENT OUTREACH (OUTPATIENT)
Dept: ADMINISTRATIVE | Facility: OTHER | Age: 54
End: 2019-11-12

## 2019-11-13 ENCOUNTER — OFFICE VISIT (OUTPATIENT)
Dept: NEUROSURGERY | Facility: CLINIC | Age: 54
End: 2019-11-13
Payer: OTHER MISCELLANEOUS

## 2019-11-13 VITALS — SYSTOLIC BLOOD PRESSURE: 156 MMHG | HEART RATE: 81 BPM | DIASTOLIC BLOOD PRESSURE: 101 MMHG

## 2019-11-13 DIAGNOSIS — M51.36 DEGENERATIVE DISC DISEASE, LUMBAR: ICD-10-CM

## 2019-11-13 DIAGNOSIS — Z98.890 S/P LUMBAR MICRODISCECTOMY: Primary | ICD-10-CM

## 2019-11-13 DIAGNOSIS — M48.07 FORAMINAL STENOSIS OF LUMBOSACRAL REGION: ICD-10-CM

## 2019-11-13 PROCEDURE — 99999 PR PBB SHADOW E&M-EST. PATIENT-LVL II: ICD-10-PCS | Mod: PBBFAC,,, | Performed by: NEUROLOGICAL SURGERY

## 2019-11-13 PROCEDURE — 99213 PR OFFICE/OUTPT VISIT, EST, LEVL III, 20-29 MIN: ICD-10-PCS | Mod: S$GLB,,, | Performed by: NEUROLOGICAL SURGERY

## 2019-11-13 PROCEDURE — 99999 PR PBB SHADOW E&M-EST. PATIENT-LVL II: CPT | Mod: PBBFAC,,, | Performed by: NEUROLOGICAL SURGERY

## 2019-11-13 PROCEDURE — 99213 OFFICE O/P EST LOW 20 MIN: CPT | Mod: S$GLB,,, | Performed by: NEUROLOGICAL SURGERY

## 2019-11-13 NOTE — PROGRESS NOTES
NEUROSURGICAL PROGRESS NOTE    DATE OF SERVICE:  11/13/2019    ATTENDING PHYSICIAN:  Adelso Hernandez MD    SUBJECTIVE:    INTERIM HISTORY:    This is a very pleasant 54 y.o. male, who has had a right L5-S1 microdiskectomy in January 2018 with complete relief of right leg pain.  He went back to work 2 weeks after surgery.  Several months ago he felt in a ditch.  When he was pulled out of the ditch he started to have low back pain.  He has been doing physical therapy without significant pain relief.  Occasionally he has left leg pain that shoots in the L5 distribution.  His low back pain is worse with prolonged standing or prolonged sitting.  He tried to wear a back brace with mild pain relief.  Cannot get steroid injections because he had in the past strong side effects affecting his mental status.              PAST MEDICAL HISTORY:  Active Ambulatory Problems     Diagnosis Date Noted    Cervical radiculopathy 10/23/2017    DDD (degenerative disc disease), lumbar 10/23/2017    Facet arthropathy, lumbar 10/23/2017    Impaired mobility and activities of daily living 11/10/2017    S/P lumbar microdiscectomy 03/13/2018    Weakness of trunk musculature 03/13/2018    Chronic bilateral low back pain with bilateral sciatica 08/19/2019    Decreased ROM of trunk and back 08/19/2019    Decreased strength, endurance, and mobility 08/19/2019    Numbness and tingling of lower extremity 08/19/2019    Psychosis 02/28/2014    Elevated BP without diagnosis of hypertension 09/24/2019    Low HDL (under 40) 09/24/2019    [place] cardiac risk <10% in next 10 years 10/01/2019     Resolved Ambulatory Problems     Diagnosis Date Noted    Lumbar disc herniation with radiculopathy 11/07/2017    Chronic pain syndrome 11/10/2017    Spinal stenosis of lumbar region without neurogenic claudication 12/29/2017    Radiculopathy 01/19/2018    Segmental and somatic dysfunction of lumbar region 03/13/2018    Back tightness  03/13/2018     Past Medical History:   Diagnosis Date    Anxiety     Arthritis     Sciatica        PAST SURGICAL HISTORY:  Past Surgical History:   Procedure Laterality Date    LUMBAR EPIDURAL INJECTION  11/14/2017    MINIMALLY INVASIVE MICRODISCECTOMY LUMBAR SPINE  01/2018    L5-S1       SOCIAL HISTORY:   Social History     Socioeconomic History    Marital status:      Spouse name: Not on file    Number of children: 3    Years of education: Not on file    Highest education level: Not on file   Occupational History    Occupation:      Comment: NOFD   Social Needs    Financial resource strain: Not hard at all    Food insecurity:     Worry: Never true     Inability: Never true    Transportation needs:     Medical: No     Non-medical: No   Tobacco Use    Smoking status: Never Smoker    Smokeless tobacco: Never Used   Substance and Sexual Activity    Alcohol use: No    Drug use: No    Sexual activity: Yes     Partners: Female   Lifestyle    Physical activity:     Days per week: 7 days     Minutes per session: 30 min    Stress: To some extent   Relationships    Social connections:     Talks on phone: Once a week     Gets together: Once a week     Attends Nondenominational service: More than 4 times per year     Active member of club or organization: Yes     Attends meetings of clubs or organizations: More than 4 times per year     Relationship status:    Other Topics Concern    Not on file   Social History Narrative    He is not satisfied with weight.    Rates diet as good.    He does not drink at least 1/2 gallon water daily.    He drinks 2-3 coffee/tea/caffeine-containing soft drinks daily.    Total sleep time at night is 8 hours.    He works 40 hours per week.    He does wear seat belts.    Hobbies include none.       FAMILY HISTORY:  Family History   Problem Relation Age of Onset    Hypertension Mother     Lung cancer Father     No Known Problems Sister     Leukemia  "Brother     No Known Problems Brother     No Known Problems Brother        CURRENTS MEDICATIONS:  Current Outpatient Medications on File Prior to Visit   Medication Sig Dispense Refill    gabapentin (NEURONTIN) 300 MG capsule Take 2 capsules (600 mg total) by mouth 3 (three) times daily. 180 capsule 11    naproxen (NAPROSYN) 500 MG tablet TAKE 1 TABLET BY MOUTH TWICE A DAY WITH MEALS 28 tablet 0     No current facility-administered medications on file prior to visit.        ALLERGIES:  Review of patient's allergies indicates:   Allergen Reactions    Corticosteroids (glucocorticoids)      "Psychosis"    Cashew nut Rash       REVIEW OF SYSTEMS:  Review of Systems   Constitutional: Negative for diaphoresis, fever and weight loss.   Respiratory: Negative for shortness of breath.    Cardiovascular: Negative for chest pain.   Gastrointestinal: Negative for blood in stool.   Genitourinary: Negative for hematuria.   Endo/Heme/Allergies: Does not bruise/bleed easily.   All other systems reviewed and are negative.        OBJECTIVE:    PHYSICAL EXAMINATION:   Vitals:    11/13/19 0920   BP: (!) 156/101   Pulse: 81       Physical Exam:  Vitals reviewed.    Constitutional: He appears well-developed and well-nourished.     Eyes: Pupils are equal, round, and reactive to light. Conjunctivae and EOM are normal.     Cardiovascular: Normal distal pulses and no edema.     Abdominal: Soft.     Skin: Skin displays no rash on trunk and no rash on extremities. Skin displays no lesions on trunk and no lesions on extremities.     Psych/Behavior: He is alert. He is oriented to person, place, and time. He has a normal mood and affect.     Musculoskeletal:        Neck: Range of motion is full.     Neurological:        DTRs: Tricep reflexes are 2+ on the right side and 2+ on the left side. Bicep reflexes are 2+ on the right side and 2+ on the left side. Brachioradialis reflexes are 2+ on the right side and 2+ on the left side. Patellar " reflexes are 2+ on the right side and 2+ on the left side. Achilles reflexes are 2+ on the right side and 2+ on the left side.       Back Exam     Tenderness   The patient is experiencing tenderness in the lumbar.    Range of Motion   Extension: abnormal   Flexion: abnormal   Lateral bend right: abnormal   Lateral bend left: abnormal   Rotation right: abnormal   Rotation left: abnormal     Muscle Strength   Right Quadriceps:  5/5   Left Quadriceps:  5/5   Right Hamstrings:  5/5   Left Hamstrings:  5/5     Tests   Straight leg raise right: negative  Straight leg raise left: negative    Other   Toe walk: normal  Heel walk: normal            SI joint:   Palpation at the right and left SI joints not painful  AVA test is negative bilaterally  Gaenslen test is negative bilaterally  Thigh thrust test is negative bilaterally    Neurologic Exam     Mental Status   Oriented to person, place, and time.   Speech: speech is normal   Level of consciousness: alert    Cranial Nerves   Cranial nerves II through XII intact.     CN III, IV, VI   Pupils are equal, round, and reactive to light.  Extraocular motions are normal.     Motor Exam   Muscle bulk: normal  Overall muscle tone: normal    Strength   Right deltoid: 5/5  Left deltoid: 5/5  Right biceps: 5/5  Left biceps: 5/5  Right triceps: 5/5  Left triceps: 5/5  Right wrist flexion: 5/5  Left wrist flexion: 5/5  Right wrist extension: 5/5  Left wrist extension: 5/5  Right interossei: 5/5  Left interossei: 5/5  Right iliopsoas: 5/5  Left iliopsoas: 5/5  Right quadriceps: 5/5  Left quadriceps: 5/5  Right hamstrin/5  Left hamstrin/5  Right anterior tibial: 5/5  Left anterior tibial: 5/5  Right posterior tibial: 5/5  Left posterior tibial: 5/5  Right peroneal: 5/5  Left peroneal: 5/5  Right gastroc: 5/5  Left gastroc: 5/5    Sensory Exam   Light touch normal.   Pinprick normal.     Gait, Coordination, and Reflexes     Gait  Gait: normal    Coordination   Finger to nose  coordination: normal  Tandem walking coordination: normal    Reflexes   Right brachioradialis: 2+  Left brachioradialis: 2+  Right biceps: 2+  Left biceps: 2+  Right triceps: 2+  Left triceps: 2+  Right patellar: 2+  Left patellar: 2+  Right achilles: 2+  Left achilles: 2+  Right plantar: normal  Left plantar: normal  Right Valerio: absent  Left Valerio: absent  Right ankle clonus: absent  Left ankle clonus: absent        DIAGNOSTIC DATA:  I personally interpreted the following imaging:   Lumbar spine MRI June 2019 shows degenerative disc disease at L5-S1 with left moderate to severe foraminal stenosis    ASSESMENT:  This is a 54 y.o. male with     Problem List Items Addressed This Visit        Neuro    S/P lumbar microdiscectomy - Primary      Other Visit Diagnoses     Foraminal stenosis of lumbosacral region        Degenerative disc disease, lumbar                PLAN:  I explained the natural history of the disease and all treatment options. I recommended a left minimally invasive transforaminal interbody fusion with placement of an interbody spacer with posterior instrumentation help with his left leg pain and low back pain.    We have discussed the risks of surgery including bleeding, infection, failure of surgery, CSF leak, nerve root injury, spinal cord injury, ureter injury, weakness, paralysis, peripheral neuropathy, malplaced hardware, migration of hardware, non-union, need for reoperation. Patient understands the risks and is contemplating surgery.              Adelso Hernandez MD  Cell:[phone]

## 2019-11-26 ENCOUNTER — TELEPHONE (OUTPATIENT)
Dept: ADMINISTRATIVE | Facility: OTHER | Age: 54
End: 2019-11-26

## 2019-11-29 ENCOUNTER — PATIENT OUTREACH (OUTPATIENT)
Dept: ADMINISTRATIVE | Facility: HOSPITAL | Age: 54
End: 2019-11-29

## 2019-12-02 ENCOUNTER — TELEPHONE (OUTPATIENT)
Dept: NEUROSURGERY | Facility: CLINIC | Age: 54
End: 2019-12-02

## 2020-02-10 RX ORDER — NAPROXEN 500 MG/1
TABLET ORAL
Qty: 28 TABLET | Refills: 0 | Status: SHIPPED | OUTPATIENT
Start: 2020-02-10

## 2020-02-23 NOTE — PROGRESS NOTES
Now  Subjective:       Patient ID: Saul Hancock is a 54 y.o. male.    Chief Complaint: Back Pain    Back Pain   This is a chronic problem. The current episode started more than 1 year ago. The problem occurs constantly. The problem has been waxing and waning since onset. The pain is present in the sacro-iliac. The quality of the pain is described as aching, burning, cramping, shooting and stabbing. The pain radiates to the left foot and right foot. The pain is at a severity of 10/10. The pain is moderate. The pain is the same all the time. The symptoms are aggravated by bending, position, sitting, standing, stress and twisting. Stiffness is present all day. Associated symptoms include chest pain, a fever, headaches, leg pain, paresthesias and perianal numbness. Pertinent negatives include no abdominal pain, numbness, tingling or weakness. Risk factors include history of steroid use, lack of exercise, obesity and poor posture. He has tried analgesics, NSAIDs, bed rest, brace/corset, heat, home exercises, ice, muscle relaxant and walking for the symptoms. The treatment provided moderate relief.     He is followed by Dr. Hernandez in Neurosurgery; s/p L5-S1 microdiscectomy in Jan 2018.  He has chronic sciatica and back pain.  He has taken 1 hydrocodone per day over the weekend for the pain.    He states he is here only to get a return to work notice, since he missed a few days of work due to pain.  His pain today is 7/10.    Patient Active Problem List   Diagnosis    Cervical radiculopathy    DDD (degenerative disc disease), lumbar    Facet arthropathy, lumbar    Impaired mobility and activities of daily living    S/P lumbar microdiscectomy    Weakness of trunk musculature    Chronic bilateral low back pain with bilateral sciatica    Decreased ROM of trunk and back    Decreased strength, endurance, and mobility    Numbness and tingling of lower extremity    Psychosis    Elevated BP without diagnosis of  "hypertension    Low HDL (under 40)    [place] cardiac risk <10% in next 10 years    Leg length discrepancy       Current Outpatient Medications:     gabapentin (NEURONTIN) 300 MG capsule, Take 2 capsules (600 mg total) by mouth 3 (three) times daily., Disp: 180 capsule, Rfl: 11    naproxen (NAPROSYN) 500 MG tablet, TAKE 1 TABLET BY MOUTH TWICE A DAY WITH MEALS, Disp: 28 tablet, Rfl: 0    The following portions of the patient's history were reviewed and updated as appropriate: allergies, past family history, past medical history, past social history and past surgical history.    Review of Systems   Constitutional: Positive for fever.   Cardiovascular: Positive for chest pain.   Gastrointestinal: Negative for abdominal pain.   Genitourinary: Positive for difficulty urinating (describes one episode of incontinence).   Musculoskeletal: Positive for back pain.   Neurological: Positive for headaches and paresthesias. Negative for tingling, weakness and numbness.       Objective:      /88   Pulse 100   Ht 6' 3" (1.905 m)   Wt 102.5 kg (226 lb)   SpO2 97%   BMI 28.25 kg/m²     Physical Exam   Constitutional: He is oriented to person, place, and time. He appears well-developed and well-nourished.   HENT:   Head: Normocephalic and atraumatic.   Musculoskeletal:   Right leg shorterr than left.  Negative SLR on R; markedly positive of left.  In obvious pain during exam.   Neurological: He is alert and oriented to person, place, and time. He displays no atrophy and no tremor.   Motor strength 4.5/5 right LE, 4/5  left.   Skin: Skin is warm and dry.   Psychiatric: He has a normal mood and affect.         Assessment:       1. Chronic bilateral low back pain with bilateral sciatica    2. Facet arthropathy, lumbar    3. Numbness and tingling of lower extremity    4. S/P lumbar microdiscectomy    5. Leg length discrepancy          Plan:       May return to work, with self limiting restrictions, as all these " "symptoms are chronic..  Will discuss with Dr. Hernandez.    "This note will not be shared with the patient."  "

## 2020-02-24 ENCOUNTER — OFFICE VISIT (OUTPATIENT)
Dept: FAMILY MEDICINE | Facility: CLINIC | Age: 55
End: 2020-02-24
Attending: FAMILY MEDICINE
Payer: COMMERCIAL

## 2020-02-24 VITALS
WEIGHT: 226 LBS | OXYGEN SATURATION: 97 % | SYSTOLIC BLOOD PRESSURE: 122 MMHG | DIASTOLIC BLOOD PRESSURE: 88 MMHG | HEART RATE: 100 BPM | HEIGHT: 75 IN | BODY MASS INDEX: 28.1 KG/M2

## 2020-02-24 DIAGNOSIS — M54.41 CHRONIC BILATERAL LOW BACK PAIN WITH BILATERAL SCIATICA: Primary | ICD-10-CM

## 2020-02-24 DIAGNOSIS — R20.2 NUMBNESS AND TINGLING OF LOWER EXTREMITY: ICD-10-CM

## 2020-02-24 DIAGNOSIS — M54.42 CHRONIC BILATERAL LOW BACK PAIN WITH BILATERAL SCIATICA: Primary | ICD-10-CM

## 2020-02-24 DIAGNOSIS — Z98.890 S/P LUMBAR MICRODISCECTOMY: ICD-10-CM

## 2020-02-24 DIAGNOSIS — M47.816 FACET ARTHROPATHY, LUMBAR: ICD-10-CM

## 2020-02-24 DIAGNOSIS — R20.0 NUMBNESS AND TINGLING OF LOWER EXTREMITY: ICD-10-CM

## 2020-02-24 DIAGNOSIS — M21.70 LEG LENGTH DISCREPANCY: ICD-10-CM

## 2020-02-24 DIAGNOSIS — G89.29 CHRONIC BILATERAL LOW BACK PAIN WITH BILATERAL SCIATICA: Primary | ICD-10-CM

## 2020-02-24 PROCEDURE — 99999 PR PBB SHADOW E&M-EST. PATIENT-LVL III: ICD-10-PCS | Mod: PBBFAC,,, | Performed by: FAMILY MEDICINE

## 2020-02-24 PROCEDURE — 3079F DIAST BP 80-89 MM HG: CPT | Mod: CPTII,S$GLB,, | Performed by: FAMILY MEDICINE

## 2020-02-24 PROCEDURE — 99214 PR OFFICE/OUTPT VISIT, EST, LEVL IV, 30-39 MIN: ICD-10-PCS | Mod: S$GLB,,, | Performed by: FAMILY MEDICINE

## 2020-02-24 PROCEDURE — 3074F SYST BP LT 130 MM HG: CPT | Mod: CPTII,S$GLB,, | Performed by: FAMILY MEDICINE

## 2020-02-24 PROCEDURE — 99214 OFFICE O/P EST MOD 30 MIN: CPT | Mod: S$GLB,,, | Performed by: FAMILY MEDICINE

## 2020-02-24 PROCEDURE — 3079F PR MOST RECENT DIASTOLIC BLOOD PRESSURE 80-89 MM HG: ICD-10-PCS | Mod: CPTII,S$GLB,, | Performed by: FAMILY MEDICINE

## 2020-02-24 PROCEDURE — 3074F PR MOST RECENT SYSTOLIC BLOOD PRESSURE < 130 MM HG: ICD-10-PCS | Mod: CPTII,S$GLB,, | Performed by: FAMILY MEDICINE

## 2020-02-24 PROCEDURE — 3008F PR BODY MASS INDEX (BMI) DOCUMENTED: ICD-10-PCS | Mod: CPTII,S$GLB,, | Performed by: FAMILY MEDICINE

## 2020-02-24 PROCEDURE — 99999 PR PBB SHADOW E&M-EST. PATIENT-LVL III: CPT | Mod: PBBFAC,,, | Performed by: FAMILY MEDICINE

## 2020-02-24 PROCEDURE — 3008F BODY MASS INDEX DOCD: CPT | Mod: CPTII,S$GLB,, | Performed by: FAMILY MEDICINE

## 2020-02-24 NOTE — LETTER
February 24, 2020      [facility]  [address] [place] [address], [address]  [facility] [address]  Phone: [phone]       Patient: Saul Hancock   YOB: 1965  Date of Visit: 02/24/2020    To Whom It May Concern:    Connie Hancock  was at [facility] on 02/24/2020. He may return to work/school on 02/26/2020 with no restrictions. If you have any questions or concerns, or if I can be of further assistance, please do not hesitate to contact me.    Sincerely,    Pat Zarate LPN

## 2020-02-27 ENCOUNTER — TELEPHONE (OUTPATIENT)
Dept: FAMILY MEDICINE | Facility: CLINIC | Age: 55
End: 2020-02-27

## 2020-02-27 NOTE — TELEPHONE ENCOUNTER
----- Message from Gayle Preston sent at 2/27/2020  1:35 PM CST -----  Contact: BRANDAN LUONG   Name of Who is Calling: BRANDAN LUONG     What is the request in detail:Patient is requesting a call back he states he has never received a call back from  Adelso Hernandez MD to schedule an appointment       Can the clinic reply by [facility]: no      What Number [facility] Back [facility]: [phone]

## 2020-02-27 NOTE — TELEPHONE ENCOUNTER
My last office note from 02/24/2020 was forwarded to Dr. Hernandez before the patient left the examination room.  My note to Dr. Hernandez asked for any additional recommendations.

## 2020-02-27 NOTE — TELEPHONE ENCOUNTER
----- Message from Kendy Garcia sent at 2/27/2020  2:29 PM CST -----  Contact: pt  Name of Who is Calling: pt    What is the request in detail: states he is returning a call per Dr. Dasilva request. Pt states he is needing to speak with Dr. Dasilva only. Please contact to further discuss and advise      Can the clinic reply by [facility]: no    What Number to Call Back if not in [facility]: [phone]

## 2020-03-12 ENCOUNTER — TELEPHONE (OUTPATIENT)
Dept: FAMILY MEDICINE | Facility: CLINIC | Age: 55
End: 2020-03-12

## 2020-03-12 NOTE — TELEPHONE ENCOUNTER
Patient states he was recently in contact with someone who has been exposed to COVID-19 (Coronavirus).  Patient is not presenting any symptoms. Patient states he is a  and works with the public therefore he wanted to inform Dr. Dasilva. Patient states he is currently working from home. Patient states he would like a call back from Dr. Dasivla.

## 2020-03-14 NOTE — TELEPHONE ENCOUNTER
Patient was concerned about the possibility of exposure to Covid19.  After listening to his story, there are 3° of separation between him and someone suspected of possibly having Covid 19, but not confirmed.    Reassured patient, and advised that there is no need to self quarantine.

## 2020-03-23 PROBLEM — M54.42 CHRONIC BILATERAL LOW BACK PAIN WITH BILATERAL SCIATICA: Status: RESOLVED | Noted: 2019-08-19 | Resolved: 2020-03-23

## 2020-03-23 PROBLEM — Z74.09 DECREASED STRENGTH, ENDURANCE, AND MOBILITY: Status: RESOLVED | Noted: 2019-08-19 | Resolved: 2020-03-23

## 2020-03-23 PROBLEM — R20.2 NUMBNESS AND TINGLING OF LOWER EXTREMITY: Status: RESOLVED | Noted: 2019-08-19 | Resolved: 2020-03-23

## 2020-03-23 PROBLEM — R20.0 NUMBNESS AND TINGLING OF LOWER EXTREMITY: Status: RESOLVED | Noted: 2019-08-19 | Resolved: 2020-03-23

## 2020-03-23 PROBLEM — G89.29 CHRONIC BILATERAL LOW BACK PAIN WITH BILATERAL SCIATICA: Status: RESOLVED | Noted: 2019-08-19 | Resolved: 2020-03-23

## 2020-03-23 PROBLEM — M54.41 CHRONIC BILATERAL LOW BACK PAIN WITH BILATERAL SCIATICA: Status: RESOLVED | Noted: 2019-08-19 | Resolved: 2020-03-23

## 2020-03-23 PROBLEM — R68.89 DECREASED STRENGTH, ENDURANCE, AND MOBILITY: Status: RESOLVED | Noted: 2019-08-19 | Resolved: 2020-03-23

## 2020-03-23 PROBLEM — R53.1 DECREASED STRENGTH, ENDURANCE, AND MOBILITY: Status: RESOLVED | Noted: 2019-08-19 | Resolved: 2020-03-23

## 2020-03-23 PROBLEM — M25.69 DECREASED ROM OF TRUNK AND BACK: Status: RESOLVED | Noted: 2019-08-19 | Resolved: 2020-03-23

## 2020-03-25 ENCOUNTER — DOCUMENTATION ONLY (OUTPATIENT)
Dept: REHABILITATION | Facility: HOSPITAL | Age: 55
End: 2020-03-25

## 2020-03-25 NOTE — PROGRESS NOTES
Outpatient Therapy Discharge Summary     Name: Saul Hancock  [facility] Number: [id number]    Therapy Diagnosis:   Encounter Diagnoses   Name Primary?    Chronic bilateral low back pain with bilateral sciatica Yes    Decreased ROM of trunk and back      Decreased strength, endurance, and mobility      Numbness and tingling of lower extremity        Physician: Adelso Hernandez MD     Physician Orders: PT Eval and Treat   Recurrent right L5-S1 disc herniation with radicvulopathy. Lumbar spine physical therapy 3 times a week for 6 weeks.   Back extensor and core muscles strengthening. Myofascial release. Marylin exercises. Home exercises.      Medical Diagnosis from Referral:   M51.27 (ICD-10-CM) - Lumbosacral disc herniation   M54.17 (ICD-10-CM) - Lumbosacral radiculopathy at S1   Evaluation Date: 8/19/2019        Date of Last visit: 9/25/2019  Total Visits Received: 8    Assessment    Goals per last progress note  Short Term Goals: 4 weeks      1. Pt will be independent with HEP in order to demonstrate self management. Ongoing   2. Pt will report a decrease in pain at its worse to 7/10 in order to improve quality of life. Ongoing    3. Pt will increase lumbar extension, sidebending, and rotation ROM by 5 degrees to decrease pain and increase functional mobility. Ongoing    4. Pt will increase BLE MMT by 1/3 muscle grade in order to demonstrate increased strength. Ongoing   5. Pt will be able to lift a 5-10# item off the floor with < or = to minimal difficulty in order to improve tolerance to lifting and carrying groceries. Ongoing      Long Term Goals: 8 weeks      1. Pt will be independent with updated HEP in order to demonstrate self management. Ongoing   2. Pt will report a decrease in pain at its worse to 5/10 in order to improve quality of life. Ongoing   3. Pt will increase lumbar ROM by 10 to improve gait and functional mobility. Ongoing   4. Pt will increase BLE MMT by 1 muscle grade to improve  strength and endurance to perform daily activities. Ongoing   5. Pt will have a FOTO limitation score of < or = to 34% to demonstrate improved functional mobility. Ongoing   6. Pt will be able to perform his usual work and housework activities with < or = to minimal difficulty. Ongoing     Discharge reason: Patient has not attended therapy since 9/25/2019    Plan   This patient is discharged from Physical Therapy

## 2020-04-03 ENCOUNTER — TELEPHONE (OUTPATIENT)
Dept: NEUROSURGERY | Facility: CLINIC | Age: 55
End: 2020-04-03

## 2020-04-03 NOTE — TELEPHONE ENCOUNTER
Left message to contact clinic re: 04/27/20 appointment being r/s to a virtual visit due C-19 concerns.    Will send message to pts portal.

## 2020-04-08 RX ORDER — ZIPRASIDONE HYDROCHLORIDE 40 MG/1
CAPSULE ORAL
COMMUNITY
Start: 2020-03-09

## 2020-04-08 NOTE — PROGRESS NOTES
Subjective:       Patient ID: Saul Hancock is a 54 y.o. male.    Chief Complaint: Rash    Rash   This is a new problem. The current episode started in the past 7 days (3 days ago). The problem has been gradually worsening since onset. The affected locations include the back. The rash is characterized by blistering, burning and pain. He was exposed to nothing. Pertinent negatives include no cough, fatigue, fever, joint pain, rhinorrhea or shortness of breath. Past treatments include nothing. His past medical history is significant for varicella.     The patient had previously declined shingles vaccine (9/24/2019).  After that visit, laboratory investigation revealed dyslipidemia.  I recommended lifestyle changes, low-dose aspirin therapy, and a trial of a statin.  He did not directly respond to my recommendations.    Patient Active Problem List   Diagnosis    Cervical radiculopathy    DDD (degenerative disc disease), lumbar    Facet arthropathy, lumbar    Impaired mobility and activities of daily living    S/P lumbar microdiscectomy    Weakness of trunk musculature    Psychosis    Elevated BP without diagnosis of hypertension    Low HDL (under 40)    [place] cardiac risk <10% in next 10 years    Leg length discrepancy       Current Outpatient Medications:     gabapentin (NEURONTIN) 300 MG capsule, Take 2 capsules (600 mg total) by mouth 3 (three) times daily., Disp: 180 capsule, Rfl: 11    naproxen (NAPROSYN) 500 MG tablet, TAKE 1 TABLET BY MOUTH TWICE A DAY WITH MEALS, Disp: 28 tablet, Rfl: 0    ziprasidone (GEODON) 40 MG Cap, TK 1 C PO BID WF, Disp: , Rfl:     aspirin (ECOTRIN) 81 MG EC tablet, Take 1 tablet (81 mg total) by mouth once daily., Disp: , Rfl: 0    atorvastatin (LIPITOR) 40 MG tablet, Take 1 tablet (40 mg total) by mouth once daily., Disp: 30 tablet, Rfl: 2    varicella-zoster gE-AS01B, PF, (SHINGRIX, PF,) 50 mcg/0.5 mL injection, Inject 0.5 mLs into the muscle once. for 1  "dose, Disp: 0.5 mL, Rfl: 0    The following portions of the patient's history were reviewed and updated as appropriate: allergies, past family history, past medical history, past social history and past surgical history.    Review of Systems   Constitutional: Negative for fatigue and fever.   HENT: Negative for rhinorrhea.    Respiratory: Negative for cough and shortness of breath.    Musculoskeletal: Negative for joint pain.   Skin: Positive for rash.       Objective:      /80 (BP Location: Left arm, Patient Position: Sitting)   Pulse 72   Ht 6' 3" (1.905 m)   Wt 108 kg (238 lb)   SpO2 98%   BMI 29.75 kg/m²     Physical Exam   Skin: Rash noted. Rash is vesicular.                   Assessment:       1. Herpes zoster without complication    2. Colon cancer screening    3. Low HDL (under 40)    4. [place] cardiac risk <10% in next 10 years        Plan:       Offered HIV screening.  Discussed colon cancer screening:  Patient agrees to colonoscopy.  Again recommended shingles vaccine once this resolves..    Again recommended dietary changes, low-dose aspirin therapy, and a trial of a statin medication.  The patient is at low to intermediate risk for heart disease. He agrees.  Info provided on diet/exercise.    RTC 3 months.    "This note will not be shared with the patient."  "

## 2020-04-09 ENCOUNTER — TELEPHONE (OUTPATIENT)
Dept: FAMILY MEDICINE | Facility: CLINIC | Age: 55
End: 2020-04-09

## 2020-04-09 ENCOUNTER — OFFICE VISIT (OUTPATIENT)
Dept: FAMILY MEDICINE | Facility: CLINIC | Age: 55
End: 2020-04-09
Attending: FAMILY MEDICINE
Payer: COMMERCIAL

## 2020-04-09 VITALS
SYSTOLIC BLOOD PRESSURE: 124 MMHG | HEART RATE: 72 BPM | OXYGEN SATURATION: 98 % | WEIGHT: 238 LBS | BODY MASS INDEX: 29.59 KG/M2 | DIASTOLIC BLOOD PRESSURE: 80 MMHG | HEIGHT: 75 IN

## 2020-04-09 DIAGNOSIS — E78.6 LOW HDL (UNDER 40): ICD-10-CM

## 2020-04-09 DIAGNOSIS — Z91.89 FRAMINGHAM CARDIAC RISK <10% IN NEXT 10 YEARS: ICD-10-CM

## 2020-04-09 DIAGNOSIS — B02.9 HERPES ZOSTER WITHOUT COMPLICATION: Primary | ICD-10-CM

## 2020-04-09 DIAGNOSIS — Z12.11 COLON CANCER SCREENING: ICD-10-CM

## 2020-04-09 PROCEDURE — 3008F PR BODY MASS INDEX (BMI) DOCUMENTED: ICD-10-PCS | Mod: CPTII,S$GLB,, | Performed by: FAMILY MEDICINE

## 2020-04-09 PROCEDURE — 3079F PR MOST RECENT DIASTOLIC BLOOD PRESSURE 80-89 MM HG: ICD-10-PCS | Mod: CPTII,S$GLB,, | Performed by: FAMILY MEDICINE

## 2020-04-09 PROCEDURE — 99214 OFFICE O/P EST MOD 30 MIN: CPT | Mod: S$GLB,,, | Performed by: FAMILY MEDICINE

## 2020-04-09 PROCEDURE — 3079F DIAST BP 80-89 MM HG: CPT | Mod: CPTII,S$GLB,, | Performed by: FAMILY MEDICINE

## 2020-04-09 PROCEDURE — 99999 PR PBB SHADOW E&M-EST. PATIENT-LVL III: ICD-10-PCS | Mod: PBBFAC,,, | Performed by: FAMILY MEDICINE

## 2020-04-09 PROCEDURE — 99214 PR OFFICE/OUTPT VISIT, EST, LEVL IV, 30-39 MIN: ICD-10-PCS | Mod: S$GLB,,, | Performed by: FAMILY MEDICINE

## 2020-04-09 PROCEDURE — 3074F PR MOST RECENT SYSTOLIC BLOOD PRESSURE < 130 MM HG: ICD-10-PCS | Mod: CPTII,S$GLB,, | Performed by: FAMILY MEDICINE

## 2020-04-09 PROCEDURE — 3008F BODY MASS INDEX DOCD: CPT | Mod: CPTII,S$GLB,, | Performed by: FAMILY MEDICINE

## 2020-04-09 PROCEDURE — 99999 PR PBB SHADOW E&M-EST. PATIENT-LVL III: CPT | Mod: PBBFAC,,, | Performed by: FAMILY MEDICINE

## 2020-04-09 PROCEDURE — 3074F SYST BP LT 130 MM HG: CPT | Mod: CPTII,S$GLB,, | Performed by: FAMILY MEDICINE

## 2020-04-09 RX ORDER — ASPIRIN 81 MG/1
81 TABLET ORAL DAILY
Refills: 0 | COMMUNITY
Start: 2020-04-09

## 2020-04-09 RX ORDER — VALACYCLOVIR HYDROCHLORIDE 1 G/1
1000 TABLET, FILM COATED ORAL 3 TIMES DAILY
Qty: 21 TABLET | Refills: 0 | Status: SHIPPED | OUTPATIENT
Start: 2020-04-09 | End: 2020-04-09 | Stop reason: SDUPTHER

## 2020-04-09 RX ORDER — ATORVASTATIN CALCIUM 40 MG/1
40 TABLET, FILM COATED ORAL DAILY
Qty: 30 TABLET | Refills: 2 | Status: SHIPPED | OUTPATIENT
Start: 2020-04-09 | End: 2020-08-03

## 2020-04-09 RX ORDER — ATORVASTATIN CALCIUM 40 MG/1
40 TABLET, FILM COATED ORAL DAILY
Qty: 30 TABLET | Refills: 2 | Status: SHIPPED | OUTPATIENT
Start: 2020-04-09 | End: 2020-04-09 | Stop reason: SDUPTHER

## 2020-04-09 RX ORDER — VALACYCLOVIR HYDROCHLORIDE 1 G/1
1000 TABLET, FILM COATED ORAL 3 TIMES DAILY
Qty: 21 TABLET | Refills: 0 | Status: SHIPPED | OUTPATIENT
Start: 2020-04-09 | End: 2020-11-21

## 2020-04-09 NOTE — TELEPHONE ENCOUNTER
----- Message from Tatum Robles sent at 4/9/2020 11:02 AM CDT -----  Contact: Pt  Pt states his prescriptions went to the wrong pharmacy. It should go to [facility] on [address]. It's already been updated in the computer.

## 2020-04-09 NOTE — TELEPHONE ENCOUNTER
During the rooming process, I asked the patient if [facility] was still his pharmacy. Patient stated yes. Patient's pharmacy has been updated.

## 2020-04-13 ENCOUNTER — TELEPHONE (OUTPATIENT)
Dept: FAMILY MEDICINE | Facility: CLINIC | Age: 55
End: 2020-04-13

## 2020-04-13 NOTE — TELEPHONE ENCOUNTER
Status  Sent to Plan today 04/13/2020  Drug  valACYclovir HCl 1GM tablets  Form  [employer] Electronic Prior Authorization Form (2017 NCPDP)  Original Claim Info  [facility]: [facility] FOR APPROVAL:[phone].PRIOR AUTHORIZATION REQRD(PHARMACY HELP DESK [phone])     Prior authorization has been submitted via coverMDVIPs. Turn around time is 24-72 hours for determination letter.

## 2020-04-22 ENCOUNTER — PATIENT OUTREACH (OUTPATIENT)
Dept: ADMINISTRATIVE | Facility: OTHER | Age: 55
End: 2020-04-22

## 2020-04-27 ENCOUNTER — TELEPHONE (OUTPATIENT)
Dept: NEUROSURGERY | Facility: CLINIC | Age: 55
End: 2020-04-27

## 2020-04-27 NOTE — TELEPHONE ENCOUNTER
Apologized to Mr. Hancock in regards to him being on hold. Patient has been rescheduled to see MD in clinic - per  [place]'s request.

## 2020-04-27 NOTE — TELEPHONE ENCOUNTER
----- Message from Bren Burks sent at 4/27/2020  2:10 PM CDT -----  Contact: pt  Pt would like to be called back regarding his appt- pt states that he was on hold for 30 min     Pt can be reached at [phone]

## 2020-05-07 ENCOUNTER — PATIENT OUTREACH (OUTPATIENT)
Dept: ADMINISTRATIVE | Facility: OTHER | Age: 55
End: 2020-05-07

## 2020-05-07 NOTE — PROGRESS NOTES
Patient's chart was reviewed.   Requested updates within Care Everywhere.  Pt has open case request for colonoscopy.  Health Maintenance was updated.

## 2020-05-11 ENCOUNTER — OFFICE VISIT (OUTPATIENT)
Dept: NEUROSURGERY | Facility: CLINIC | Age: 55
End: 2020-05-11
Payer: OTHER MISCELLANEOUS

## 2020-05-11 VITALS
WEIGHT: 238 LBS | HEART RATE: 86 BPM | SYSTOLIC BLOOD PRESSURE: 152 MMHG | BODY MASS INDEX: 29.59 KG/M2 | TEMPERATURE: 99 F | DIASTOLIC BLOOD PRESSURE: 92 MMHG | HEIGHT: 75 IN

## 2020-05-11 DIAGNOSIS — M51.37 DDD (DEGENERATIVE DISC DISEASE), LUMBOSACRAL: Primary | ICD-10-CM

## 2020-05-11 DIAGNOSIS — M48.061 BILATERAL STENOSIS OF LATERAL RECESS OF LUMBAR SPINE: ICD-10-CM

## 2020-05-11 DIAGNOSIS — M48.07 FORAMINAL STENOSIS OF LUMBOSACRAL REGION: ICD-10-CM

## 2020-05-11 PROCEDURE — 99999 PR PBB SHADOW E&M-EST. PATIENT-LVL III: CPT | Mod: PBBFAC,,, | Performed by: NEUROLOGICAL SURGERY

## 2020-05-11 PROCEDURE — 99212 OFFICE O/P EST SF 10 MIN: CPT | Mod: S$GLB,,, | Performed by: NEUROLOGICAL SURGERY

## 2020-05-11 PROCEDURE — 99999 PR PBB SHADOW E&M-EST. PATIENT-LVL III: ICD-10-PCS | Mod: PBBFAC,,, | Performed by: NEUROLOGICAL SURGERY

## 2020-05-11 PROCEDURE — 99212 PR OFFICE/OUTPT VISIT, EST, LEVL II, 10-19 MIN: ICD-10-PCS | Mod: S$GLB,,, | Performed by: NEUROLOGICAL SURGERY

## 2020-05-11 RX ORDER — GABAPENTIN 600 MG/1
TABLET ORAL
COMMUNITY
Start: 2020-04-16

## 2020-06-29 NOTE — TELEPHONE ENCOUNTER
----- Message from Denisa Preston sent at 6/17/2019  4:39 PM CDT -----  Contact: self / [phone]  Patient is requesting a call back. As well  he needs all his medications transferred to the below. Please advise       [facility]  Address: [facility] [address], [place] [address]  Phone: [phone]           40

## 2020-11-21 DIAGNOSIS — E78.6 LOW HDL (UNDER 40): Primary | ICD-10-CM

## 2020-11-21 RX ORDER — ERGOCALCIFEROL 1.25 MG/1
CAPSULE ORAL
COMMUNITY
Start: 2020-11-13

## 2020-11-21 RX ORDER — ATORVASTATIN CALCIUM 40 MG/1
TABLET, FILM COATED ORAL
Qty: 30 TABLET | Refills: 0 | Status: SHIPPED | OUTPATIENT
Start: 2020-11-21 | End: 2020-12-28

## 2020-11-25 NOTE — TELEPHONE ENCOUNTER
Pt came in to have his labs weren't fasting he said he'll come back another day but he didn't schedule.

## 2020-12-02 ENCOUNTER — LAB VISIT (OUTPATIENT)
Dept: LAB | Facility: HOSPITAL | Age: 55
End: 2020-12-02
Attending: FAMILY MEDICINE
Payer: COMMERCIAL

## 2020-12-02 DIAGNOSIS — E78.6 LOW HDL (UNDER 40): ICD-10-CM

## 2020-12-02 LAB
ALBUMIN SERPL BCP-MCNC: 3.8 G/DL (ref 3.5–5.2)
ALP SERPL-CCNC: 79 U/L (ref 55–135)
ALT SERPL W/O P-5'-P-CCNC: 39 U/L (ref 10–44)
ANION GAP SERPL CALC-SCNC: 7 MMOL/L (ref 8–16)
AST SERPL-CCNC: 23 U/L (ref 10–40)
BILIRUB SERPL-MCNC: 0.5 MG/DL (ref 0.1–1)
BUN SERPL-MCNC: 12 MG/DL (ref 6–20)
CALCIUM SERPL-MCNC: 9 MG/DL (ref 8.7–10.5)
CHLORIDE SERPL-SCNC: 105 MMOL/L (ref 95–110)
CHOLEST SERPL-MCNC: 125 MG/DL (ref 120–199)
CHOLEST/HDLC SERPL: 3.4 {RATIO} (ref 2–5)
CO2 SERPL-SCNC: 29 MMOL/L (ref 23–29)
CREAT SERPL-MCNC: 1.1 MG/DL (ref 0.5–1.4)
EST. GFR  (AFRICAN AMERICAN): >60 ML/MIN/1.73 M^2
EST. GFR  (NON AFRICAN AMERICAN): >60 ML/MIN/1.73 M^2
GLUCOSE SERPL-MCNC: 84 MG/DL (ref 70–110)
HDLC SERPL-MCNC: 37 MG/DL (ref 40–75)
HDLC SERPL: 29.6 % (ref 20–50)
LDLC SERPL CALC-MCNC: 73.6 MG/DL (ref 63–159)
NONHDLC SERPL-MCNC: 88 MG/DL
POTASSIUM SERPL-SCNC: 3.9 MMOL/L (ref 3.5–5.1)
PROT SERPL-MCNC: 7.5 G/DL (ref 6–8.4)
SODIUM SERPL-SCNC: 141 MMOL/L (ref 136–145)
TRIGL SERPL-MCNC: 72 MG/DL (ref 30–150)

## 2020-12-02 PROCEDURE — 80061 LIPID PANEL: CPT

## 2020-12-02 PROCEDURE — 80053 COMPREHEN METABOLIC PANEL: CPT

## 2020-12-02 PROCEDURE — 36415 COLL VENOUS BLD VENIPUNCTURE: CPT | Mod: PO

## 2020-12-03 ENCOUNTER — PATIENT MESSAGE (OUTPATIENT)
Dept: FAMILY MEDICINE | Facility: CLINIC | Age: 55
End: 2020-12-03

## 2021-01-29 ENCOUNTER — PATIENT MESSAGE (OUTPATIENT)
Dept: ADMINISTRATIVE | Facility: HOSPITAL | Age: 56
End: 2021-01-29

## 2021-07-15 ENCOUNTER — PATIENT MESSAGE (OUTPATIENT)
Dept: INTERNAL MEDICINE | Facility: CLINIC | Age: 56
End: 2021-07-15

## 2021-09-10 ENCOUNTER — OCCUPATIONAL HEALTH (OUTPATIENT)
Dept: URGENT CARE | Facility: CLINIC | Age: 56
End: 2021-09-10
Payer: OTHER MISCELLANEOUS

## 2021-09-10 DIAGNOSIS — Z11.59 ENCOUNTER FOR SCREENING FOR OTHER VIRAL DISEASES: Primary | ICD-10-CM

## 2021-09-10 LAB
CTP QC/QA: YES
SARS-COV-2 RDRP RESP QL NAA+PROBE: NEGATIVE

## 2021-09-10 PROCEDURE — U0002 COVID-19 LAB TEST NON-CDC: HCPCS | Mod: QW,S$GLB,, | Performed by: INTERNAL MEDICINE

## 2021-09-10 PROCEDURE — U0002: ICD-10-PCS | Mod: QW,S$GLB,, | Performed by: INTERNAL MEDICINE

## 2021-12-23 ENCOUNTER — TELEPHONE (OUTPATIENT)
Dept: FAMILY MEDICINE | Facility: CLINIC | Age: 56
End: 2021-12-23
Payer: COMMERCIAL

## 2021-12-23 ENCOUNTER — OFFICE VISIT (OUTPATIENT)
Dept: URGENT CARE | Facility: CLINIC | Age: 56
End: 2021-12-23
Payer: COMMERCIAL

## 2021-12-23 VITALS
RESPIRATION RATE: 16 BRPM | SYSTOLIC BLOOD PRESSURE: 175 MMHG | DIASTOLIC BLOOD PRESSURE: 97 MMHG | HEART RATE: 66 BPM | BODY MASS INDEX: 29.59 KG/M2 | HEIGHT: 75 IN | OXYGEN SATURATION: 97 % | TEMPERATURE: 97 F | WEIGHT: 238 LBS

## 2021-12-23 DIAGNOSIS — L25.9 CONTACT DERMATITIS, UNSPECIFIED CONTACT DERMATITIS TYPE, UNSPECIFIED TRIGGER: Primary | ICD-10-CM

## 2021-12-23 PROCEDURE — 3077F SYST BP >= 140 MM HG: CPT | Mod: CPTII,S$GLB,, | Performed by: PHYSICIAN ASSISTANT

## 2021-12-23 PROCEDURE — 3080F DIAST BP >= 90 MM HG: CPT | Mod: CPTII,S$GLB,, | Performed by: PHYSICIAN ASSISTANT

## 2021-12-23 PROCEDURE — 3077F PR MOST RECENT SYSTOLIC BLOOD PRESSURE >= 140 MM HG: ICD-10-PCS | Mod: CPTII,S$GLB,, | Performed by: PHYSICIAN ASSISTANT

## 2021-12-23 PROCEDURE — 99203 PR OFFICE/OUTPT VISIT, NEW, LEVL III, 30-44 MIN: ICD-10-PCS | Mod: S$GLB,,, | Performed by: PHYSICIAN ASSISTANT

## 2021-12-23 PROCEDURE — 3008F BODY MASS INDEX DOCD: CPT | Mod: CPTII,S$GLB,, | Performed by: PHYSICIAN ASSISTANT

## 2021-12-23 PROCEDURE — 1160F PR REVIEW ALL MEDS BY PRESCRIBER/CLIN PHARMACIST DOCUMENTED: ICD-10-PCS | Mod: CPTII,S$GLB,, | Performed by: PHYSICIAN ASSISTANT

## 2021-12-23 PROCEDURE — 1160F RVW MEDS BY RX/DR IN RCRD: CPT | Mod: CPTII,S$GLB,, | Performed by: PHYSICIAN ASSISTANT

## 2021-12-23 PROCEDURE — 3008F PR BODY MASS INDEX (BMI) DOCUMENTED: ICD-10-PCS | Mod: CPTII,S$GLB,, | Performed by: PHYSICIAN ASSISTANT

## 2021-12-23 PROCEDURE — 3080F PR MOST RECENT DIASTOLIC BLOOD PRESSURE >= 90 MM HG: ICD-10-PCS | Mod: CPTII,S$GLB,, | Performed by: PHYSICIAN ASSISTANT

## 2021-12-23 PROCEDURE — 99203 OFFICE O/P NEW LOW 30 MIN: CPT | Mod: S$GLB,,, | Performed by: PHYSICIAN ASSISTANT

## 2021-12-23 PROCEDURE — 1159F MED LIST DOCD IN RCRD: CPT | Mod: CPTII,S$GLB,, | Performed by: PHYSICIAN ASSISTANT

## 2021-12-23 PROCEDURE — 1159F PR MEDICATION LIST DOCUMENTED IN MEDICAL RECORD: ICD-10-PCS | Mod: CPTII,S$GLB,, | Performed by: PHYSICIAN ASSISTANT

## 2021-12-23 RX ORDER — BENZOCAINE 20 %
PASTE (GRAM) MUCOUS MEMBRANE 2 TIMES DAILY PRN
Qty: 177 ML | Refills: 0 | Status: SHIPPED | OUTPATIENT
Start: 2021-12-23 | End: 2022-01-02

## 2021-12-23 RX ORDER — DIPHENHYDRAMINE HCL 50 MG
50 CAPSULE ORAL 3 TIMES DAILY
Qty: 30 CAPSULE | Refills: 0 | Status: SHIPPED | OUTPATIENT
Start: 2021-12-23 | End: 2022-01-02

## 2022-07-01 NOTE — PATIENT INSTRUCTIONS

## 2025-03-26 ENCOUNTER — OFFICE VISIT (OUTPATIENT)
Dept: URGENT CARE | Facility: CLINIC | Age: 60
End: 2025-03-26
Payer: COMMERCIAL

## 2025-03-26 VITALS
RESPIRATION RATE: 18 BRPM | SYSTOLIC BLOOD PRESSURE: 129 MMHG | HEART RATE: 70 BPM | HEIGHT: 75 IN | OXYGEN SATURATION: 98 % | BODY MASS INDEX: 29.75 KG/M2 | DIASTOLIC BLOOD PRESSURE: 77 MMHG | TEMPERATURE: 99 F

## 2025-03-26 DIAGNOSIS — M47.812 SPONDYLOSIS OF CERVICAL REGION WITHOUT MYELOPATHY OR RADICULOPATHY: Primary | ICD-10-CM

## 2025-03-26 DIAGNOSIS — L25.4 CONTACT DERMATITIS DUE TO FOOD IN CONTACT WITH SKIN, UNSPECIFIED CONTACT DERMATITIS TYPE: ICD-10-CM

## 2025-03-26 DIAGNOSIS — S16.1XXA CERVICAL STRAIN, ACUTE, INITIAL ENCOUNTER: ICD-10-CM

## 2025-03-26 PROCEDURE — 99214 OFFICE O/P EST MOD 30 MIN: CPT | Mod: S$GLB,,, | Performed by: PHYSICIAN ASSISTANT

## 2025-03-26 RX ORDER — METHOCARBAMOL 750 MG/1
750 TABLET, FILM COATED ORAL EVERY 8 HOURS PRN
Qty: 30 TABLET | Refills: 0 | Status: SHIPPED | OUTPATIENT
Start: 2025-03-26

## 2025-03-26 RX ORDER — TRIAMCINOLONE ACETONIDE 1 MG/G
CREAM TOPICAL 2 TIMES DAILY
Qty: 30 G | Refills: 0 | Status: SHIPPED | OUTPATIENT
Start: 2025-03-26 | End: 2025-04-02

## 2025-03-26 RX ORDER — MELOXICAM 7.5 MG/1
7.5 TABLET ORAL DAILY PRN
Qty: 30 TABLET | Refills: 0 | Status: SHIPPED | OUTPATIENT
Start: 2025-03-26

## 2025-03-26 NOTE — PATIENT INSTRUCTIONS
PLEASE READ YOUR DISCHARGE INSTRUCTIONS ENTIRELY AS IT CONTAINS IMPORTANT INFORMATION.  - use steroid cream to both hands for rash as needed.  -OTC Tylenol/anti-inflammatory as needed for pain (see below). These medications can be obtained over the counter at any local pharmacy without requiring a prescription.   OTC ORAL medications for pain reliever or fever reducing:  - Acetaminophen (tylenol 650mg every 8 hour as needed with food) or Ibuprofen (advil,motrin 400-600mg every 8 hour with food as needed) as directed as needed for fever/pain. Avoid tylenol if you have a history of liver disease or allergic reactions. Do not take ibuprofen if you have a history of allergic reactions, stomach bleeding ulcers, kidney disease, or if you take blood thinners.  -The patient was advised that NSAID-type medications have two very important potential side effects: gastrointestinal irritation including hemorrhage and renal injuries. patient was asked to take the medication with food and to stop if patient experiences any GI upset. I asked patient to call for vomiting, abdominal pain or black/bloody stools. The patient expresses understanding of these issues and all questions were answered.  OTC TOPICAL meds for pain reliever :  -You can apply OTC diclofenac or Volatren Gel 2-3 times daily to muscle or joints.  -You can also apply OTC lidocaine 4-5% with OR without menthol ointment 2-3 daily to muscle or joints.  -Please let one absorb before using the other. Do not use both at the same time as it may decrease efficacy. Please stop using if you develop any skin rash or irritation.  -Please wash your hands after application of these topical products.     - do not use OTC anti-inflammatory if taking prescribed (Rx--meloxicam) anti-inflammatory.   - no operating machinery with muscle relaxant as it may cause drowsiness.  - continue heat (muscle) /ice (bone/joint) compression, rice therapy, and muscle stretches.   - Radiographs and  all diagnostic testing reviewed with patient  - if no improvement or worsening symptoms, recommend follow-up with * PCP for further evaluation.  Please call the number below to set up appointment; if a referral has been placed.  - Follow up with your PCP or specialty clinic as directed.  You can call [phone] or [phone] to schedule an appointment with the appropriate provider.      -You must understand that you've received an Urgent Care treatment only and that you may be released before all your medical problems are known or treated. You, the patient, will arrange for follow up care as instructed. Please arrange follow up with your primary medical clinic within 2-5 days if your signs and symptoms have not resolved or worsen.   - If your condition worsens or fails to improve we recommend that you receive another evaluation at the emergency room immediately or contact your primary medical clinic to discuss your concerns in next 2-5 days.  Strict ER versus clinic precautions given.      RED FLAGS/WARNING SYMPTOMS DISCUSSED WITH PATIENT THAT WOULD WARRANT EMERGENT MEDICAL ATTENTION. Patient aware and verbalized understanding.      RICE     Rest an injury, elevate it, and use ice and compression as directed.   RICE stands for rest, ice, compression, and elevation. These can limit pain and swelling after an injury. RICE may be recommended to help treat fractures, sprains, strains, and bruises or bumps.   Home care  The following explain the details of RICE:  Rest. Limit the use of the injured body part. This helps prevent further damage to the body part and gives it time to heal. In some cases, you may need a sling, brace, splint, or cast to help keep the body part still until it has healed.  Ice. Applying ice right after an injury helps relieve pain and swelling. Wrap a bag of ice in a thin towel. Then, place it over the injured area. Do this for 10 to 15 minutes every 3 to 4 hours. Continue for the next 1 to  3 days or until your symptoms improve. Never put ice directly on your skin or ice an area longer than 15 minutes at a time.  Compression. Putting pressure on an injury helps reduce swelling and provides support. Wrap the injured area firmly with an elastic bandage/wrap. Make sure not to wrap the bandage too tightly or you will cut off blood flow to the injured area. If your bandage loosens, rewrap it.  Elevation. Keeping an injury raised above the level of your heart reduces swelling, pain, and throbbing. For instance, if you have a broken leg, it may help to rest your leg on several pillows when sitting or lying down. Try to keep the injured area elevated for at least 2 to 3 hours per day.  Follow-up care  Follow up with your healthcare provider, or as advised.  When to seek medical advice  Call your healthcare provider right away if any of these occur:  Fever of 100.4°F (38°C) or higher, or as directed by your healthcare provider  Increased pain or swelling in the injured body part  Injured body part becomes cold, blue, numb, or tingly  Signs of infection. These include warmth in the skin, redness, drainage, or bad smell coming from the injured body part.  Date Last Reviewed: 1/18/2016  © 5433-0353 [employer]. [address] [place] [address], [place] [address]. All rights reserved. This information is not intended as a substitute for professional medical care. Always follow your healthcare professional's instructions.

## 2025-03-26 NOTE — PROGRESS NOTES
"Subjective:      Patient ID: Saul Hancock is a 59 y.o. male.    Vitals:  height is 6' 3" (1.905 m). His oral temperature is 98.9 °F (37.2 °C). His blood pressure is 129/77 and his pulse is 70. His respiration is 18 and oxygen saturation is 98%.     Chief Complaint: Neck Pain and Rash      69-year-old male with a history of cervical disc disease, lumbar disc disease status post lumbar microdiskectomy, anxiety, and other comorbidities presents to urgent care clinic for evaluation.  He has 2 different complaints in clinic today.  Has bilateral neck pain (left worse than right) that started 2 weeks ago after working underneath a car and held his neck and multiple different positions.  The next day he developed neck pain without radiating arm pain/weakness, numbness/tingling, bladder bowel incontinence, saddle anesthesia, rash, fever, chills, chest pain, shortness of breath, or gait instability.  Took OTC a leave last yesterday with mild relief.  Pain is 10/10 at its worst and with certain neck rotation.    While he is here, he would like to discuss the rash in both of his palms.  It is peeling and dry but not painful or itching.  This has happened before in the past after he touch and 8 cashews.  Recent episode occurred about 2  weeks ago.  No fever, chills, weakness, wound drainage, or any other complaints.    Neck Pain   This is a new problem. The current episode started 1 to 4 weeks ago. The problem occurs constantly. The problem has been unchanged. The pain is associated with nothing. The pain is present in the left side and right side. The quality of the pain is described as aching. The pain is at a severity of 8/10. The pain is severe. The symptoms are aggravated by position and twisting. The pain is Same all the time. Associated symptoms include headaches. Pertinent negatives include no chest pain, fever, leg pain, numbness, pain with swallowing, paresis, photophobia, syncope, tingling, trouble swallowing, " visual change, weakness or weight loss. He has tried heat and NSAIDs for the symptoms. The treatment provided mild relief.   Rash  This is a recurrent problem. The current episode started 1 to 4 weeks ago. The problem has been gradually worsening since onset. The affected locations include the left hand and right hand. The rash is characterized by peeling. Pertinent negatives include no anorexia, congestion, cough, diarrhea, eye pain, facial edema, fatigue, fever, joint pain, nail changes, rhinorrhea, shortness of breath, sore throat or vomiting. Past treatments include nothing. The treatment provided moderate relief.       Constitution: Negative for activity change, chills, sweating, fatigue, fever and generalized weakness.   HENT:  Negative for ear pain, hearing loss, facial swelling, congestion, postnasal drip, sinus pain, sinus pressure, sore throat, trouble swallowing and voice change.    Neck: Positive for neck pain, degenerative disc disease and bulging disc disease. Negative for neck stiffness and painful lymph nodes.   Cardiovascular:  Negative for chest pain, leg swelling, palpitations, sob on exertion and passing out.   Eyes:  Negative for eye discharge, eye pain, eye redness, photophobia, vision loss, double vision, blurred vision and eyelid swelling.   Respiratory:  Negative for chest tightness, cough, sputum production, COPD, shortness of breath, wheezing and asthma.    Gastrointestinal:  Negative for abdominal pain, nausea, vomiting, diarrhea, bright red blood in stool, dark colored stools, rectal bleeding, heartburn and bowel incontinence.   Genitourinary:  Negative for dysuria, frequency, urgency, urine decreased, flank pain, bladder incontinence, hematuria and history of kidney stones.   Musculoskeletal:  Positive for muscle ache and history of spine disorder. Negative for trauma, joint pain, joint swelling, abnormal ROM of joint and muscle cramps.   Skin:  Positive for rash. Negative for color  change, pale, wound and hives.   Allergic/Immunologic: Negative for seasonal allergies, asthma, immunocompromised state, hives and itching.   Neurological:  Positive for headaches. Negative for dizziness, history of vertigo, light-headedness, passing out, facial drooping, speech difficulty, coordination disturbances, loss of balance, disorientation, altered mental status, loss of consciousness, numbness, tingling and seizures.   Hematologic/Lymphatic: Negative for swollen lymph nodes, easy bruising/bleeding and trouble clotting. Does not bruise/bleed easily.   Psychiatric/Behavioral:  Negative for altered mental status and disorientation.       Objective:     Physical Exam   Constitutional: He appears well-developed. He is cooperative. He does not appear ill. No distress.      Comments:Well appearing     HENT:   Head: Normocephalic.      Comments: No facial or oral pharyngeal edema/rash  Ears:   Right Ear: Hearing, external ear and ear canal normal.   Left Ear: Hearing, external ear and ear canal normal.   Nose: Nose normal.   Mouth/Throat: Oropharynx is clear and moist. Mucous membranes are moist.   Eyes: Conjunctivae and EOM are normal. Right eye exhibits no discharge. Left eye exhibits no discharge. vision grossly intact gaze aligned appropriately   Neck: Phonation normal. Neck supple.   Cardiovascular: Normal rate and regular rhythm.   Pulmonary/Chest: Effort normal. No accessory muscle usage. No respiratory distress. He has no wheezes.   Abdominal: Normal appearance. He exhibits no distension.   Musculoskeletal:         General: Tenderness present.      Right lower leg: No edema.      Left lower leg: No edema.      Comments: Spinal exam:   Moves all extremities with good strength 5/5  BUE- deltoid, triceps, biceps, wrist ext/flexion, hand , and interossei  BLE- hip flexion, knee ext/flexion, dorsiflexion, plantar flexion, EHL, ankle inversion, and ankle eversion    No drift or dysmetria.   Standing and  sitting posture normal.  Gait normal.      Sensation intact to light touch throughout.  2+ DTR bilaterally.    Full back ROM; no pain with all ROM  Full neck ROM; pain with all lateral/rotation ROM.    Negative Lhermitte's or Spurling's    There is no tenderness to palpation of midline spine.  There is no bony step-off, crepitus, or bony tenderness to palpation.      There is muscle spasms present in bilateral cervical paraspinal muscle, trapezius, rhomboid.     Bilateral elbow/wrist joint spaces with no warmth erythema, edema, or tenderness to palpation.  Full ROM and strength.  No gross abnormality.    Bilateral shoulder joint spaces with no warmth erythema, edema, or tenderness to palpation.    Full ROM and overhead ROM.    No pain or weakness with Mcgraw or empty can test maneuver.  No gross abnormality or TTP of bilateral clavicles.     Neurological: no focal deficit. He is alert and at baseline. He has normal motor skills. He displays no weakness, facial symmetry, normal reflexes and no dysarthria. No cranial nerve deficit or sensory deficit. He exhibits normal muscle tone. Gait and coordination normal. Coordination and gait normal.   Skin: Skin is warm, dry, intact and rash. Capillary refill takes less than 2 seconds.         Comments: Bilateral palms with dry skin desquamation.  No erythema, warmth, tenderness, drainage, fluctuance, vesicle, or induration.   Psychiatric: His speech is normal and behavior is normal. Mood, affect, judgment and thought content normal.   Nursing note and vitals reviewed.             Procedure: Cervical spine 7 views to include lateral flexion and extension.     Comparison study: 3/15/2010.     Findings:     Disc degeneration associated with marginal spondylotic C5-C6 anterior osteophytes is again noted.  There is no subluxation.  On flexion and extension radiographs there is no abnormal motion.  Spinous processes are intact.  No acute fractures or osteoblastic lytic lesions.   Mild to moderate bilateral C4-C5 and C5-C6 foraminal stenosis from uncovertebral joint osteophytes.  If clinically indicated this can be better evaluated with a CT scan.     Prevertebral soft tissues are within normal limits.  C1-C2 articulation is within normal limits.  Craniovertebral alignment also within normal limits.  IMPRESSION:         Cervical spondylosis C4-C5 and C5-C6.  No abnormal motion on flexion and extension.        Electronically signed by:LUCY HARTMANN MD  Date:                                            10/27/17  Time:                                           15:54   Assessment:     1. Spondylosis of cervical region without myelopathy or radiculopathy    2. Cervical strain, acute, initial encounter    3. Contact dermatitis due to food in contact with skin, unspecified contact dermatitis type      Note dictated with voice recognition software, please excuse any grammatical errors.    Patient presents with clinical exam findings and history consistent with above.  We discussed the differential diagnosis.    On exam, patient is nontoxic appearing and vitals are stable.  Patient is essentially neurovascularly intact on exam.      Diagnostic testing results were independently reviewed and interpreted, which were discussed in depth with patient.   Test ordered in clinic:  None.  Additionally, previous progress notes/admissions/lab were reviewed and interpreted.  CMP 12/02/2020 and 09/27/2019 with Good kidney function and EGFR.  Previous progress note 12/23/2021 reviewed   Previous ED progress note 02/28/2014 reviewed  Cervical x-rays 10/27/2017 reviewed    We had shared medical decision making for patient's treatment and care.    Plan:     Note dictated with voice recognition software, please excuse any grammatical errors.    Offered Toradol injection in clinic for pain and orthopedic/outpatient physical therapy referral but patient adamantly declined; states he will follow up with his PCP if no  improvements with conservative treatment. Patient was prescribed meds and recommended OTC treatments for their symptoms. Patient was treated conservatively with activity modification, OTC pain reliever, muscle stretches, and Warm vs. RICE therapy. If symptoms do not improve/worsens, patient was referred back to PCP for continued outpatient workup and management.     Patient was instructed to return for re-evaluation for any worsening or change in current symptoms. Strict ED versus clinic precautions given in depth. Discharge and follow-up instructions given verbally/printed with the patient who expressed understanding and willingness to comply with my recommendations.  Patient verbalized understanding and agreed with the entirety of plan of care.    Spondylosis of cervical region without myelopathy or radiculopathy  -     meloxicam (MOBIC) 7.5 MG tablet; Take 1 tablet (7.5 mg total) by mouth daily as needed for Pain (Take with food in AM).  Dispense: 30 tablet; Refill: 0  -     methocarbamoL (ROBAXIN) 750 MG Tab; Take 1 tablet (750 mg total) by mouth every 8 (eight) hours as needed (muscle spasms).  Dispense: 30 tablet; Refill: 0    Cervical strain, acute, initial encounter  -     meloxicam (MOBIC) 7.5 MG tablet; Take 1 tablet (7.5 mg total) by mouth daily as needed for Pain (Take with food in AM).  Dispense: 30 tablet; Refill: 0  -     methocarbamoL (ROBAXIN) 750 MG Tab; Take 1 tablet (750 mg total) by mouth every 8 (eight) hours as needed (muscle spasms).  Dispense: 30 tablet; Refill: 0    Contact dermatitis due to food in contact with skin, unspecified contact dermatitis type  -     triamcinolone acetonide 0.1% (KENALOG) 0.1 % cream; Apply topically 2 (two) times daily. Apply to both hands as needed for rash for 7 days  Dispense: 30 g; Refill: 0             Additional MDM:     Heart Failure Score:   COPD = No

## (undated) DEVICE — DIFFUSER

## (undated) DEVICE — CLIPPER BLADE MOD 4406 (CAREF)

## (undated) DEVICE — SUT MCRYL PLUS 4-0 PS2 27IN

## (undated) DEVICE — DRESSING AQUACEL FOAM 3 X 3

## (undated) DEVICE — SEE MEDLINE ITEM 157131

## (undated) DEVICE — DRESSING LEUKOPLAST FLEX 1X3IN

## (undated) DEVICE — DURAPREP SURG SCRUB 26ML

## (undated) DEVICE — BLADE ELECTRO EDGE INSULATED

## (undated) DEVICE — DRAPE STERI INSTRUMENT 1018

## (undated) DEVICE — CARTRIDGE OIL

## (undated) DEVICE — DRAPE ABDOMINAL TIBURON 14X11

## (undated) DEVICE — CATH SUCTION 10FR

## (undated) DEVICE — DRAPE INCISE IOBAN 2 23X17IN

## (undated) DEVICE — DRAPE OPMI STERILE

## (undated) DEVICE — SEE MEDLINE ITEM 156905

## (undated) DEVICE — DRESSING AQUACEL FOAM 5 X 5

## (undated) DEVICE — NDL HYPO REG 25G X 1 1/2

## (undated) DEVICE — TUBE FRAZIER 5MM 2FT SOFT TIP

## (undated) DEVICE — BUR BONE CUT MICRO TPS 3X3.8MM

## (undated) DEVICE — HEMOSTAT SURGICEL 4X8IN

## (undated) DEVICE — KIT SURGIFLO EVITHROM

## (undated) DEVICE — TRAY FOLEY 16FR INFECTION CONT

## (undated) DEVICE — DRESSING SURGICAL 1/2X1/2

## (undated) DEVICE — DRAPE STERI-DRAPE 1000 17X11IN

## (undated) DEVICE — ELECTRODE REM PLYHSV RETURN 9

## (undated) DEVICE — DRESSING ABSRBNT ISLAND 3.6X8

## (undated) DEVICE — DRESSING AQUACEL SACRAL 9 X 9

## (undated) DEVICE — DRAPE C ARM 42 X 120 10/BX

## (undated) DEVICE — CORD BIPOLAR 12 FOOT

## (undated) DEVICE — STAPLER SKIN PROXIMATE WIDE

## (undated) DEVICE — DRAPE C-ARMOR EQUIPMENT COVER

## (undated) DEVICE — DRESSING TELFA N ADH 3X8

## (undated) DEVICE — DRESSING TRANS 4X4 TEGADERM

## (undated) DEVICE — GAUZE SPONGE 4X4 12PLY

## (undated) DEVICE — ADHESIVE MASTISOL VIAL 48/BX

## (undated) DEVICE — DRESSING MEPILEX BORDER 4 X 4

## (undated) DEVICE — SYS LABEL CORRECT MED

## (undated) DEVICE — SUT D SPECIAL VICRYL 2-0

## (undated) DEVICE — MARKER SKIN STND TIP BLUE BARR